# Patient Record
Sex: MALE | Race: WHITE | NOT HISPANIC OR LATINO | URBAN - METROPOLITAN AREA
[De-identification: names, ages, dates, MRNs, and addresses within clinical notes are randomized per-mention and may not be internally consistent; named-entity substitution may affect disease eponyms.]

---

## 2017-05-23 ENCOUNTER — EMERGENCY (EMERGENCY)
Facility: HOSPITAL | Age: 83
LOS: 1 days | Discharge: HOME | End: 2017-05-23
Attending: INTERNAL MEDICINE | Admitting: INTERNAL MEDICINE
Payer: MEDICARE

## 2017-05-23 VITALS
RESPIRATION RATE: 18 BRPM | HEART RATE: 81 BPM | HEIGHT: 74 IN | OXYGEN SATURATION: 97 % | DIASTOLIC BLOOD PRESSURE: 70 MMHG | TEMPERATURE: 98 F | WEIGHT: 235.01 LBS | SYSTOLIC BLOOD PRESSURE: 159 MMHG

## 2017-05-23 VITALS
RESPIRATION RATE: 17 BRPM | TEMPERATURE: 99 F | OXYGEN SATURATION: 98 % | SYSTOLIC BLOOD PRESSURE: 149 MMHG | DIASTOLIC BLOOD PRESSURE: 83 MMHG | HEART RATE: 79 BPM

## 2017-05-23 DIAGNOSIS — D37.9 NEOPLASM OF UNCERTAIN BEHAVIOR OF DIGESTIVE ORGAN, UNSPECIFIED: Chronic | ICD-10-CM

## 2017-05-23 PROCEDURE — 99283 EMERGENCY DEPT VISIT LOW MDM: CPT

## 2017-05-23 PROCEDURE — 99282 EMERGENCY DEPT VISIT SF MDM: CPT

## 2017-05-23 RX ORDER — MORPHINE SULFATE 50 MG/1
4 CAPSULE, EXTENDED RELEASE ORAL ONCE
Qty: 0 | Refills: 0 | Status: DISCONTINUED | OUTPATIENT
Start: 2017-05-23 | End: 2017-05-23

## 2017-05-23 NOTE — ED PROVIDER NOTE - MEDICAL DECISION MAKING DETAILS
seems to have had reaction to chemical he used on feet last night and exacerbated today after using epsom salts...pain resolved in er without rx.

## 2017-05-23 NOTE — ED PROVIDER NOTE - PHYSICAL EXAMINATION
some edema of both LEs that he always experiences. capillary refill on right LE slightly slow. neither hot or cold. not erythematous. no rash. no reproducible pain on palpation some chronic edema of both LEs . capillary refill on right LE slightly slow. neither hot or cold. not erythematous. no rash. no reproducible pain on palpation.pulses slightly diminished bilat..

## 2017-05-23 NOTE — ED PROVIDER NOTE - DETAILS:
Lamin Santos MD - The scribe's documentation has been prepared under my direction and personally reviewed by me in its entirety. I confirm that the note above accurately reflects all work, treatment, procedures, and medical decision making performed by me.

## 2017-05-23 NOTE — ED PROVIDER NOTE - CARDIAC, MLM
Normal rate, regular rhythm.  Heart sounds S1, S2.  No murmurs, rubs or gallops. capillary refill on right lower extremity slightly slow Normal rate, regular rhythm.  Heart sounds S1, S2.  No murmurs, rubs or gallops. capillary refill on right lower extremity slightly slow. mild edema of both lower extremities that he always experiences

## 2017-05-23 NOTE — ED PROVIDER NOTE - OBJECTIVE STATEMENT
82 y/o M pt with history of HTN, RBBB, presents to the ED c/o pain in both lower extremities from the foot radiating to just above the ankle, beginning yesterday after using some chemical on his feet. Pt states the pain slightly improved, but then this morning, after again applying epsom salts to both feet had the same pain in that area. Pt recently went to Podiatrist where they had his great toes trimmed. No hx of neuropathy in feet. Denies numbness/tingling, fever. No further complaints at this time. 82 y/o M pt with history of HTN, RBBB, presents to the ED c/o pain in both lower extremities from the foot radiating to just above the ankle, beginning yesterday after using some chemical on his feet. Pt states the pain improved, but then this morning, after again applying epsom salts to both feet had the same pain in that area. Pt recently went to Podiatrist where they had his great toes trimmed. No hx of neuropathy in feet. Denies numbness/tingling, fever. No further complaints at this time.no fever

## 2017-05-23 NOTE — ED PROVIDER NOTE - NS ED MD SCRIBE ATTENDING SCRIBE SECTIONS
HISTORY OF PRESENT ILLNESS/PHYSICAL EXAM/PAST MEDICAL/SURGICAL/SOCIAL HISTORY/DISPOSITION/VITAL SIGNS( Pullset)/REVIEW OF SYSTEMS

## 2017-05-23 NOTE — ED PROVIDER NOTE - SKIN, MLM
Skin normal color for race, dry and intact. No evidence of rash. neither hot or cold. not erythematous.

## 2017-05-23 NOTE — ED PROVIDER NOTE - MUSCULOSKELETAL, MLM
Spine appears normal, range of motion is not limited, no muscle or joint tenderness. no reproducible pain on palpation

## 2017-05-23 NOTE — ED ADULT NURSE NOTE - OBJECTIVE STATEMENT
Pt reported came in for burning pain over his bilateral ankles and feet area since yesterday after he applied chemical cream to the affected area. No rash no erythema noted but slight swelling of the bilateral ankles and feet area noted. Pt reported that swelling is not new. Pt is comfortable looking no distress noted.

## 2017-05-26 ENCOUNTER — TRANSCRIPTION ENCOUNTER (OUTPATIENT)
Age: 83
End: 2017-05-26

## 2017-06-10 ENCOUNTER — TRANSCRIPTION ENCOUNTER (OUTPATIENT)
Age: 83
End: 2017-06-10

## 2017-07-21 ENCOUNTER — EMERGENCY (EMERGENCY)
Facility: HOSPITAL | Age: 83
LOS: 0 days | Discharge: HOME | End: 2017-07-21
Attending: EMERGENCY MEDICINE | Admitting: EMERGENCY MEDICINE
Payer: MEDICARE

## 2017-07-21 VITALS
DIASTOLIC BLOOD PRESSURE: 110 MMHG | HEART RATE: 86 BPM | TEMPERATURE: 98 F | OXYGEN SATURATION: 98 % | SYSTOLIC BLOOD PRESSURE: 159 MMHG | RESPIRATION RATE: 18 BRPM

## 2017-07-21 VITALS
RESPIRATION RATE: 18 BRPM | TEMPERATURE: 98 F | HEART RATE: 87 BPM | HEIGHT: 74 IN | DIASTOLIC BLOOD PRESSURE: 93 MMHG | SYSTOLIC BLOOD PRESSURE: 169 MMHG | WEIGHT: 235.01 LBS | OXYGEN SATURATION: 98 %

## 2017-07-21 DIAGNOSIS — J45.909 UNSPECIFIED ASTHMA, UNCOMPLICATED: ICD-10-CM

## 2017-07-21 DIAGNOSIS — D37.9 NEOPLASM OF UNCERTAIN BEHAVIOR OF DIGESTIVE ORGAN, UNSPECIFIED: Chronic | ICD-10-CM

## 2017-07-21 DIAGNOSIS — I10 ESSENTIAL (PRIMARY) HYPERTENSION: ICD-10-CM

## 2017-07-21 DIAGNOSIS — Z85.048 PERSONAL HISTORY OF OTHER MALIGNANT NEOPLASM OF RECTUM, RECTOSIGMOID JUNCTION, AND ANUS: ICD-10-CM

## 2017-07-21 DIAGNOSIS — I45.10 UNSPECIFIED RIGHT BUNDLE-BRANCH BLOCK: ICD-10-CM

## 2017-07-21 DIAGNOSIS — N39.0 URINARY TRACT INFECTION, SITE NOT SPECIFIED: ICD-10-CM

## 2017-07-21 DIAGNOSIS — Z46.6 ENCOUNTER FOR FITTING AND ADJUSTMENT OF URINARY DEVICE: ICD-10-CM

## 2017-07-21 DIAGNOSIS — R33.9 RETENTION OF URINE, UNSPECIFIED: ICD-10-CM

## 2017-07-21 LAB
ANION GAP SERPL CALC-SCNC: 4 MMOL/L — LOW (ref 5–17)
APPEARANCE UR: CLEAR — SIGNIFICANT CHANGE UP
BACTERIA # UR AUTO: (no result)
BASOPHILS # BLD AUTO: 0.1 K/UL — SIGNIFICANT CHANGE UP (ref 0–0.2)
BASOPHILS NFR BLD AUTO: 1 % — SIGNIFICANT CHANGE UP (ref 0–2)
BILIRUB UR-MCNC: NEGATIVE — SIGNIFICANT CHANGE UP
BUN SERPL-MCNC: 22 MG/DL — SIGNIFICANT CHANGE UP (ref 7–23)
CALCIUM SERPL-MCNC: 9.8 MG/DL — SIGNIFICANT CHANGE UP (ref 8.5–10.1)
CHLORIDE SERPL-SCNC: 102 MMOL/L — SIGNIFICANT CHANGE UP (ref 96–108)
CO2 SERPL-SCNC: 30 MMOL/L — SIGNIFICANT CHANGE UP (ref 22–31)
COLOR SPEC: YELLOW — SIGNIFICANT CHANGE UP
COMMENT - URINE: SIGNIFICANT CHANGE UP
CREAT SERPL-MCNC: 1.08 MG/DL — SIGNIFICANT CHANGE UP (ref 0.5–1.3)
DIFF PNL FLD: (no result)
EOSINOPHIL # BLD AUTO: 0.2 K/UL — SIGNIFICANT CHANGE UP (ref 0–0.5)
EOSINOPHIL NFR BLD AUTO: 2.4 % — SIGNIFICANT CHANGE UP (ref 0–6)
EPI CELLS # UR: SIGNIFICANT CHANGE UP
GLUCOSE SERPL-MCNC: 173 MG/DL — HIGH (ref 70–99)
GLUCOSE UR QL: NEGATIVE MG/DL — SIGNIFICANT CHANGE UP
HCT VFR BLD CALC: 42.6 % — SIGNIFICANT CHANGE UP (ref 39–50)
HGB BLD-MCNC: 14.3 G/DL — SIGNIFICANT CHANGE UP (ref 13–17)
KETONES UR-MCNC: NEGATIVE — SIGNIFICANT CHANGE UP
LEUKOCYTE ESTERASE UR-ACNC: (no result)
LYMPHOCYTES # BLD AUTO: 2 K/UL — SIGNIFICANT CHANGE UP (ref 1–3.3)
LYMPHOCYTES # BLD AUTO: 28.3 % — SIGNIFICANT CHANGE UP (ref 13–44)
MCHC RBC-ENTMCNC: 29.1 PG — SIGNIFICANT CHANGE UP (ref 27–34)
MCHC RBC-ENTMCNC: 33.5 GM/DL — SIGNIFICANT CHANGE UP (ref 32–36)
MCV RBC AUTO: 86.8 FL — SIGNIFICANT CHANGE UP (ref 80–100)
MONOCYTES # BLD AUTO: 0.6 K/UL — SIGNIFICANT CHANGE UP (ref 0–0.9)
MONOCYTES NFR BLD AUTO: 8.5 % — SIGNIFICANT CHANGE UP (ref 2–14)
NEUTROPHILS # BLD AUTO: 4.1 K/UL — SIGNIFICANT CHANGE UP (ref 1.8–7.4)
NEUTROPHILS NFR BLD AUTO: 59.7 % — SIGNIFICANT CHANGE UP (ref 43–77)
NITRITE UR-MCNC: NEGATIVE — SIGNIFICANT CHANGE UP
PH UR: 6 — SIGNIFICANT CHANGE UP (ref 5–8)
PLATELET # BLD AUTO: 134 K/UL — LOW (ref 150–400)
POTASSIUM SERPL-MCNC: 3.9 MMOL/L — SIGNIFICANT CHANGE UP (ref 3.5–5.3)
POTASSIUM SERPL-SCNC: 3.9 MMOL/L — SIGNIFICANT CHANGE UP (ref 3.5–5.3)
PROT UR-MCNC: 30 MG/DL
RBC # BLD: 4.91 M/UL — SIGNIFICANT CHANGE UP (ref 4.2–5.8)
RBC # FLD: 12.5 % — SIGNIFICANT CHANGE UP (ref 10.3–14.5)
RBC CASTS # UR COMP ASSIST: >50 /HPF (ref 0–4)
SODIUM SERPL-SCNC: 136 MMOL/L — SIGNIFICANT CHANGE UP (ref 135–145)
SP GR SPEC: 1.01 — SIGNIFICANT CHANGE UP (ref 1.01–1.02)
UROBILINOGEN FLD QL: NEGATIVE MG/DL — SIGNIFICANT CHANGE UP
WBC # BLD: 6.9 K/UL — SIGNIFICANT CHANGE UP (ref 3.8–10.5)
WBC # FLD AUTO: 6.9 K/UL — SIGNIFICANT CHANGE UP (ref 3.8–10.5)
WBC UR QL: (no result)

## 2017-07-21 PROCEDURE — 93010 ELECTROCARDIOGRAM REPORT: CPT

## 2017-07-21 PROCEDURE — 99284 EMERGENCY DEPT VISIT MOD MDM: CPT

## 2017-07-21 RX ORDER — LIDOCAINE 4 G/100G
3 CREAM TOPICAL
Qty: 1 | Refills: 0
Start: 2017-07-21 | End: 2017-07-24

## 2017-07-21 RX ORDER — CIPROFLOXACIN LACTATE 400MG/40ML
500 VIAL (ML) INTRAVENOUS ONCE
Qty: 0 | Refills: 0 | Status: COMPLETED | OUTPATIENT
Start: 2017-07-21 | End: 2017-07-21

## 2017-07-21 RX ORDER — MOXIFLOXACIN HYDROCHLORIDE TABLETS, 400 MG 400 MG/1
1 TABLET, FILM COATED ORAL
Qty: 8 | Refills: 0
Start: 2017-07-21 | End: 2017-07-25

## 2017-07-21 RX ADMIN — Medication 500 MILLIGRAM(S): at 15:50

## 2017-07-21 NOTE — ED ADULT NURSE REASSESSMENT NOTE - COMFORT CARE
side rails up/repositioned/patient is comfortable in bed with family, at bedside. hourly rounding completed

## 2017-07-21 NOTE — ED PROVIDER NOTE - PROGRESS NOTE DETAILS
none Boom Mccain, on behalf of Attending. EDRN placed robertson cath with 600cc urine outpt. Patient feels uncomfortable with catheter in place and wishes to have it removed. Have discussed plan to keep cath in place till Monday then f/u with urology on Wednesday and patient states he will discuss with family to keep cath in place or not. Boom Mccain, on behalf of Attending. Per Son Patient insists on removal of catheter and [place] had prior catheterizations without pain. Currently offered pain meds and lidocaine jelly to the area for relief and keeping of catheter in place. pt with uti secondary to instrumentation last week pt requested cipro as bactrim that he took after procedure did not help him, explained risk of tendinitis and pt understands

## 2017-07-21 NOTE — ED ADULT TRIAGE NOTE - CHIEF COMPLAINT QUOTE
c/o bladder pain, was recently told with urinary retention, recently had urodynamic testing resulting in problems with nerve endings, may need a catheter as per pateint, denies hematuria, small urine output.

## 2017-07-21 NOTE — ED PROVIDER NOTE - OBJECTIVE STATEMENT
84 y/o male with urinary retention with associated Lower abd pain within the past few hours. Patient states he had urodynamic testing and urology outpt follow up scheduled in the next 5 days. C/o abd pain, distention and discomfort. In ED for evaluation of pain and decreased urine outpt. Denies fever, chills, leg pain, any other complaints.

## 2017-08-20 ENCOUNTER — TRANSCRIPTION ENCOUNTER (OUTPATIENT)
Age: 83
End: 2017-08-20

## 2019-05-28 ENCOUNTER — EMERGENCY (EMERGENCY)
Facility: HOSPITAL | Age: 85
LOS: 0 days | Discharge: LEFT BEFORE DISCHARGE | End: 2019-05-28
Attending: EMERGENCY MEDICINE | Admitting: EMERGENCY MEDICINE
Payer: MEDICARE

## 2019-05-28 VITALS
SYSTOLIC BLOOD PRESSURE: 169 MMHG | TEMPERATURE: 98 F | OXYGEN SATURATION: 97 % | HEART RATE: 70 BPM | DIASTOLIC BLOOD PRESSURE: 97 MMHG | RESPIRATION RATE: 18 BRPM

## 2019-05-28 VITALS
DIASTOLIC BLOOD PRESSURE: 62 MMHG | TEMPERATURE: 98 F | RESPIRATION RATE: 18 BRPM | HEART RATE: 76 BPM | SYSTOLIC BLOOD PRESSURE: 165 MMHG | OXYGEN SATURATION: 95 %

## 2019-05-28 DIAGNOSIS — Z79.84 LONG TERM (CURRENT) USE OF ORAL HYPOGLYCEMIC DRUGS: ICD-10-CM

## 2019-05-28 DIAGNOSIS — J45.909 UNSPECIFIED ASTHMA, UNCOMPLICATED: ICD-10-CM

## 2019-05-28 DIAGNOSIS — Z85.048 PERSONAL HISTORY OF OTHER MALIGNANT NEOPLASM OF RECTUM, RECTOSIGMOID JUNCTION, AND ANUS: ICD-10-CM

## 2019-05-28 DIAGNOSIS — I10 ESSENTIAL (PRIMARY) HYPERTENSION: ICD-10-CM

## 2019-05-28 DIAGNOSIS — R47.81 SLURRED SPEECH: ICD-10-CM

## 2019-05-28 DIAGNOSIS — G45.9 TRANSIENT CEREBRAL ISCHEMIC ATTACK, UNSPECIFIED: ICD-10-CM

## 2019-05-28 DIAGNOSIS — D37.9 NEOPLASM OF UNCERTAIN BEHAVIOR OF DIGESTIVE ORGAN, UNSPECIFIED: Chronic | ICD-10-CM

## 2019-05-28 LAB
ALBUMIN SERPL ELPH-MCNC: 3.1 G/DL — LOW (ref 3.3–5)
ALP SERPL-CCNC: 75 U/L — SIGNIFICANT CHANGE UP (ref 40–120)
ALT FLD-CCNC: 19 U/L — SIGNIFICANT CHANGE UP (ref 12–78)
ANION GAP SERPL CALC-SCNC: 7 MMOL/L — SIGNIFICANT CHANGE UP (ref 5–17)
APPEARANCE UR: CLEAR — SIGNIFICANT CHANGE UP
APTT BLD: 32.4 SEC — SIGNIFICANT CHANGE UP (ref 27.5–36.3)
AST SERPL-CCNC: 14 U/L — LOW (ref 15–37)
BACTERIA # UR AUTO: ABNORMAL
BASOPHILS # BLD AUTO: 0.04 K/UL — SIGNIFICANT CHANGE UP (ref 0–0.2)
BASOPHILS NFR BLD AUTO: 0.6 % — SIGNIFICANT CHANGE UP (ref 0–2)
BILIRUB SERPL-MCNC: 0.3 MG/DL — SIGNIFICANT CHANGE UP (ref 0.2–1.2)
BILIRUB UR-MCNC: NEGATIVE — SIGNIFICANT CHANGE UP
BUN SERPL-MCNC: 18 MG/DL — SIGNIFICANT CHANGE UP (ref 7–23)
CALCIUM SERPL-MCNC: 9.3 MG/DL — SIGNIFICANT CHANGE UP (ref 8.5–10.1)
CHLORIDE SERPL-SCNC: 100 MMOL/L — SIGNIFICANT CHANGE UP (ref 96–108)
CO2 SERPL-SCNC: 31 MMOL/L — SIGNIFICANT CHANGE UP (ref 22–31)
COLOR SPEC: YELLOW — SIGNIFICANT CHANGE UP
COMMENT - URINE: SIGNIFICANT CHANGE UP
CREAT SERPL-MCNC: 1.25 MG/DL — SIGNIFICANT CHANGE UP (ref 0.5–1.3)
DIFF PNL FLD: ABNORMAL
EOSINOPHIL # BLD AUTO: 0.17 K/UL — SIGNIFICANT CHANGE UP (ref 0–0.5)
EOSINOPHIL NFR BLD AUTO: 2.4 % — SIGNIFICANT CHANGE UP (ref 0–6)
GLUCOSE SERPL-MCNC: 154 MG/DL — HIGH (ref 70–99)
GLUCOSE UR QL: NEGATIVE MG/DL — SIGNIFICANT CHANGE UP
HCT VFR BLD CALC: 44.3 % — SIGNIFICANT CHANGE UP (ref 39–50)
HGB BLD-MCNC: 14.6 G/DL — SIGNIFICANT CHANGE UP (ref 13–17)
IMM GRANULOCYTES NFR BLD AUTO: 0.4 % — SIGNIFICANT CHANGE UP (ref 0–1.5)
INR BLD: 1.2 RATIO — HIGH (ref 0.88–1.16)
KETONES UR-MCNC: NEGATIVE — SIGNIFICANT CHANGE UP
LEUKOCYTE ESTERASE UR-ACNC: ABNORMAL
LYMPHOCYTES # BLD AUTO: 1.92 K/UL — SIGNIFICANT CHANGE UP (ref 1–3.3)
LYMPHOCYTES # BLD AUTO: 27.4 % — SIGNIFICANT CHANGE UP (ref 13–44)
MCHC RBC-ENTMCNC: 28.6 PG — SIGNIFICANT CHANGE UP (ref 27–34)
MCHC RBC-ENTMCNC: 33 GM/DL — SIGNIFICANT CHANGE UP (ref 32–36)
MCV RBC AUTO: 86.7 FL — SIGNIFICANT CHANGE UP (ref 80–100)
MONOCYTES # BLD AUTO: 0.59 K/UL — SIGNIFICANT CHANGE UP (ref 0–0.9)
MONOCYTES NFR BLD AUTO: 8.4 % — SIGNIFICANT CHANGE UP (ref 2–14)
NEUTROPHILS # BLD AUTO: 4.26 K/UL — SIGNIFICANT CHANGE UP (ref 1.8–7.4)
NEUTROPHILS NFR BLD AUTO: 60.8 % — SIGNIFICANT CHANGE UP (ref 43–77)
NITRITE UR-MCNC: NEGATIVE — SIGNIFICANT CHANGE UP
PH UR: 6 — SIGNIFICANT CHANGE UP (ref 5–8)
PLATELET # BLD AUTO: 144 K/UL — LOW (ref 150–400)
POTASSIUM SERPL-MCNC: 3.6 MMOL/L — SIGNIFICANT CHANGE UP (ref 3.5–5.3)
POTASSIUM SERPL-SCNC: 3.6 MMOL/L — SIGNIFICANT CHANGE UP (ref 3.5–5.3)
PROT SERPL-MCNC: 6.9 GM/DL — SIGNIFICANT CHANGE UP (ref 6–8.3)
PROT UR-MCNC: 30 MG/DL
PROTHROM AB SERPL-ACNC: 13.4 SEC — HIGH (ref 10–12.9)
RBC # BLD: 5.11 M/UL — SIGNIFICANT CHANGE UP (ref 4.2–5.8)
RBC # FLD: 13.3 % — SIGNIFICANT CHANGE UP (ref 10.3–14.5)
RBC CASTS # UR COMP ASSIST: ABNORMAL /HPF (ref 0–4)
SODIUM SERPL-SCNC: 138 MMOL/L — SIGNIFICANT CHANGE UP (ref 135–145)
SP GR SPEC: 1.01 — SIGNIFICANT CHANGE UP (ref 1.01–1.02)
TROPONIN I SERPL-MCNC: <0.015 NG/ML — SIGNIFICANT CHANGE UP (ref 0.01–0.04)
UROBILINOGEN FLD QL: NEGATIVE MG/DL — SIGNIFICANT CHANGE UP
WBC # BLD: 7.01 K/UL — SIGNIFICANT CHANGE UP (ref 3.8–10.5)
WBC # FLD AUTO: 7.01 K/UL — SIGNIFICANT CHANGE UP (ref 3.8–10.5)
WBC UR QL: SIGNIFICANT CHANGE UP

## 2019-05-28 PROCEDURE — 99285 EMERGENCY DEPT VISIT HI MDM: CPT | Mod: 25

## 2019-05-28 PROCEDURE — 70450 CT HEAD/BRAIN W/O DYE: CPT | Mod: 26

## 2019-05-28 PROCEDURE — 93010 ELECTROCARDIOGRAM REPORT: CPT

## 2019-05-28 NOTE — ED ADULT TRIAGE NOTE - CHIEF COMPLAINT QUOTE
pt presents to ED due to strokelike sxs x 6 weeks with right leg weakness and dragging slurred speech aphagia MD Baron to eval in [place] no code stroke at this time

## 2019-05-28 NOTE — ED PROVIDER NOTE - OBJECTIVE STATEMENT
86 y/o male HTN, HLD, self-cath x3 times per day presents to the ED with daughter for increasing TIA symptoms. At 10:00 this morning pt with episode of slurred speech and right leg weakness. Pt's family reports these sx's initially started about 6 weeks ago but are noticing these episodes increasing in frequency. Slurred speech and weakness this past Friday, Saturday, Monday, and today. Pt reports some lightheadedness. No h/o TIA. Pt's family reports they expressed concern to pt's PMD who told them to give pt additional ASA. Pt does not have a neurologist at this time.

## 2019-05-28 NOTE — ED ADULT NURSE NOTE - NSIMPLEMENTINTERV_GEN_ALL_ED
Implemented All Fall with Harm Risk Interventions:  [place] to call system. Call bell, personal items and telephone within reach. Instruct patient to call for assistance. Room bathroom lighting operational. Non-slip footwear when patient is off stretcher. Physically safe environment: no spills, clutter or unnecessary equipment. Stretcher in lowest position, wheels locked, appropriate side rails in place. Provide visual cue, wrist band, yellow gown, etc. Monitor gait and stability. Monitor for mental status changes and reorient to person, place, and time. Review medications for side effects contributing to fall risk. Reinforce activity limits and safety measures with patient and family. Provide visual clues: red socks.

## 2019-05-28 NOTE — ED STATDOCS - PROGRESS NOTE DETAILS
Adán MENDIOLA for ED attending Dr. Baron: 84 y/o male presents with daughter for TIA symptoms. At 10:00 this morning pt with episode of slurred speech and right leg weakness. Symptoms currently resolved. NIH currently 0. Given rapid improvement of symptoms pt is not a tPA candidate. Pt will be sent to main for full work-up.

## 2019-05-28 NOTE — ED PROVIDER NOTE - NEUROLOGICAL, MLM
Alert and oriented, no focal deficits, no motor or sensory deficits. Alert and oriented, no focal deficits, no motor or sensory deficits. 5/5 strength in all 4 extremities. Cranial nerves 2-12 intact. Sensation intact. No dysmetria.

## 2019-05-28 NOTE — ED STATDOCS - NS_ ATTENDINGSCRIBEDETAILS _ED_A_ED_FT
I, Edgar Baron MD, performed the initial face to face bedside interview with this patient regarding history of present illness and determined that the patient should be seen in the main ED.  The history, was documented by the scribe in my presence and I attest to the accuracy of the documentation.

## 2019-05-28 NOTE — ED PROVIDER NOTE - CARE PROVIDER_API CALL
Gee Levy)  Internal Medicine; Neurology  [address], [address]  [place] [address]  Phone: [phone]  Fax: [phone]  Follow Up Time: 1-3 Days

## 2019-05-28 NOTE — ED PROVIDER NOTE - CLINICAL SUMMARY MEDICAL DECISION MAKING FREE TEXT BOX
Pt with multiple TIA's. Plan labs, CT head, EKG, and reassess. Pt signing out AMA to go to [facility] to see doctor there.

## 2019-05-28 NOTE — ED ADULT NURSE NOTE - OBJECTIVE STATEMENT
Patient comes to ED for slurred speech and right sided leg weakness. pt has been having symptoms for 6 weeks/ pt has had repeat episodes on friday sunday, monday and today. slurred speech is resolved, right sided leg weakness. Pt is AxOx4, no complaints any pain or discomfort. Pt was told by MD outpatient to increase asa and if symptoms worsen to come to ED. pt has all doctors at [place]

## 2019-05-28 NOTE — ED PROVIDER NOTE - PROGRESS NOTE DETAILS
Adán NUGENT for attending Dr. Guadalupe: Pt having multiple TIA's. Family refusing admission here. Want to go to doctor at [facility]. States internist will not accept them there so were advised to sign out AMA and take private ambulette there. Pt and family prefer to be at [facility]. 5/5 strength in all 4 extremities. Cranial nerves 2-12 intact. Sensation intact. No dysmetria. Pt will sign out AMA at pt's request. The pt is clinically sober, A&Ox3, free from distracting injury.  Throughout our interactions in the ED today, the pt has demonstrated concrete thinking/reasoning, has maintained an orderly/reasonable conversation, appears to have intact insight/judgment/reason, a sound mind and therefore in our opinion has capacity now to make decisions.  Given the pt’s presentation, we communicated (in laymen's terms) our concerns.  The pt verbalized an understanding of our worries.  We’ve communicated to the patient that the ED evaluation is incomplete & many troublesome conditions haven’t been r/o.  We have discussed the need for further ED w/u so we can get more information about the pt’s condition.  We have discussed the range of possible dx, potential testing & tx options.  Our discussions included the potential outcomes of leaving AMA, including worsening of their condition, becoming permanently disabled/in pain/critically ill, or death.  Despite these efforts, we were unable to convince the pt to stay.  The pt is refusing any further care and is leaving against medical advice. We have attempted to offer tx/rx/guidance for any dangerous conditions which are most likely and/or dangerous.  We have answered all questions and have implored the pt to return ASAP to complete the w/u.

## 2019-06-01 ENCOUNTER — INPATIENT (INPATIENT)
Facility: HOSPITAL | Age: 85
LOS: 1 days | Discharge: HOME | DRG: 312 | End: 2019-06-03
Attending: INTERNAL MEDICINE | Admitting: FAMILY MEDICINE
Payer: MEDICARE

## 2019-06-01 VITALS
OXYGEN SATURATION: 96 % | TEMPERATURE: 98 F | WEIGHT: 291.89 LBS | DIASTOLIC BLOOD PRESSURE: 77 MMHG | HEIGHT: 71 IN | RESPIRATION RATE: 15 BRPM | SYSTOLIC BLOOD PRESSURE: 145 MMHG | HEART RATE: 80 BPM

## 2019-06-01 DIAGNOSIS — Z90.79 ACQUIRED ABSENCE OF OTHER GENITAL ORGAN(S): Chronic | ICD-10-CM

## 2019-06-01 DIAGNOSIS — J45.909 UNSPECIFIED ASTHMA, UNCOMPLICATED: ICD-10-CM

## 2019-06-01 DIAGNOSIS — R94.31 ABNORMAL ELECTROCARDIOGRAM [ECG] [EKG]: ICD-10-CM

## 2019-06-01 DIAGNOSIS — G45.9 TRANSIENT CEREBRAL ISCHEMIC ATTACK, UNSPECIFIED: ICD-10-CM

## 2019-06-01 DIAGNOSIS — N17.9 ACUTE KIDNEY FAILURE, UNSPECIFIED: ICD-10-CM

## 2019-06-01 DIAGNOSIS — Z29.9 ENCOUNTER FOR PROPHYLACTIC MEASURES, UNSPECIFIED: ICD-10-CM

## 2019-06-01 DIAGNOSIS — E11.9 TYPE 2 DIABETES MELLITUS WITHOUT COMPLICATIONS: ICD-10-CM

## 2019-06-01 DIAGNOSIS — R33.9 RETENTION OF URINE, UNSPECIFIED: ICD-10-CM

## 2019-06-01 DIAGNOSIS — C20 MALIGNANT NEOPLASM OF RECTUM: ICD-10-CM

## 2019-06-01 DIAGNOSIS — D37.9 NEOPLASM OF UNCERTAIN BEHAVIOR OF DIGESTIVE ORGAN, UNSPECIFIED: Chronic | ICD-10-CM

## 2019-06-01 DIAGNOSIS — I45.10 UNSPECIFIED RIGHT BUNDLE-BRANCH BLOCK: ICD-10-CM

## 2019-06-01 DIAGNOSIS — I10 ESSENTIAL (PRIMARY) HYPERTENSION: ICD-10-CM

## 2019-06-01 LAB
ALBUMIN SERPL ELPH-MCNC: 3.5 G/DL — SIGNIFICANT CHANGE UP (ref 3.3–5)
ALP SERPL-CCNC: 68 U/L — SIGNIFICANT CHANGE UP (ref 40–120)
ALT FLD-CCNC: 24 U/L — SIGNIFICANT CHANGE UP (ref 12–78)
ANION GAP SERPL CALC-SCNC: 7 MMOL/L — SIGNIFICANT CHANGE UP (ref 5–17)
APPEARANCE UR: CLEAR — SIGNIFICANT CHANGE UP
APTT BLD: 28.3 SEC — SIGNIFICANT CHANGE UP (ref 27.5–36.3)
AST SERPL-CCNC: 25 U/L — SIGNIFICANT CHANGE UP (ref 15–37)
BASOPHILS # BLD AUTO: 0.03 K/UL — SIGNIFICANT CHANGE UP (ref 0–0.2)
BASOPHILS NFR BLD AUTO: 0.4 % — SIGNIFICANT CHANGE UP (ref 0–2)
BILIRUB SERPL-MCNC: 0.6 MG/DL — SIGNIFICANT CHANGE UP (ref 0.2–1.2)
BILIRUB UR-MCNC: NEGATIVE — SIGNIFICANT CHANGE UP
BUN SERPL-MCNC: 22 MG/DL — SIGNIFICANT CHANGE UP (ref 7–23)
CALCIUM SERPL-MCNC: 9.7 MG/DL — SIGNIFICANT CHANGE UP (ref 8.5–10.1)
CHLORIDE SERPL-SCNC: 100 MMOL/L — SIGNIFICANT CHANGE UP (ref 96–108)
CK MB BLD-MCNC: 1.4 % — SIGNIFICANT CHANGE UP (ref 0–3.5)
CK MB CFR SERPL CALC: 2.9 NG/ML — SIGNIFICANT CHANGE UP (ref 0–3.6)
CK SERPL-CCNC: 202 U/L — SIGNIFICANT CHANGE UP (ref 26–308)
CO2 SERPL-SCNC: 32 MMOL/L — HIGH (ref 22–31)
COLOR SPEC: YELLOW — SIGNIFICANT CHANGE UP
CREAT SERPL-MCNC: 1.6 MG/DL — HIGH (ref 0.5–1.3)
DIFF PNL FLD: ABNORMAL
EOSINOPHIL # BLD AUTO: 0.09 K/UL — SIGNIFICANT CHANGE UP (ref 0–0.5)
EOSINOPHIL NFR BLD AUTO: 1.1 % — SIGNIFICANT CHANGE UP (ref 0–6)
GLUCOSE SERPL-MCNC: 145 MG/DL — HIGH (ref 70–99)
GLUCOSE UR QL: NEGATIVE — SIGNIFICANT CHANGE UP
HCT VFR BLD CALC: 46.7 % — SIGNIFICANT CHANGE UP (ref 39–50)
HGB BLD-MCNC: 15.3 G/DL — SIGNIFICANT CHANGE UP (ref 13–17)
IMM GRANULOCYTES NFR BLD AUTO: 0.2 % — SIGNIFICANT CHANGE UP (ref 0–1.5)
INR BLD: 1.24 RATIO — HIGH (ref 0.88–1.16)
INR BLD: 1.26 RATIO — HIGH (ref 0.88–1.16)
KETONES UR-MCNC: ABNORMAL
LEUKOCYTE ESTERASE UR-ACNC: ABNORMAL
LYMPHOCYTES # BLD AUTO: 1.86 K/UL — SIGNIFICANT CHANGE UP (ref 1–3.3)
LYMPHOCYTES # BLD AUTO: 23.1 % — SIGNIFICANT CHANGE UP (ref 13–44)
MCHC RBC-ENTMCNC: 28 PG — SIGNIFICANT CHANGE UP (ref 27–34)
MCHC RBC-ENTMCNC: 32.8 GM/DL — SIGNIFICANT CHANGE UP (ref 32–36)
MCV RBC AUTO: 85.4 FL — SIGNIFICANT CHANGE UP (ref 80–100)
MONOCYTES # BLD AUTO: 0.73 K/UL — SIGNIFICANT CHANGE UP (ref 0–0.9)
MONOCYTES NFR BLD AUTO: 9.1 % — SIGNIFICANT CHANGE UP (ref 2–14)
NEUTROPHILS # BLD AUTO: 5.33 K/UL — SIGNIFICANT CHANGE UP (ref 1.8–7.4)
NEUTROPHILS NFR BLD AUTO: 66.1 % — SIGNIFICANT CHANGE UP (ref 43–77)
NITRITE UR-MCNC: NEGATIVE — SIGNIFICANT CHANGE UP
NRBC # BLD: 0 /100 WBCS — SIGNIFICANT CHANGE UP (ref 0–0)
PH UR: 5 — SIGNIFICANT CHANGE UP (ref 5–8)
PLATELET # BLD AUTO: 164 K/UL — SIGNIFICANT CHANGE UP (ref 150–400)
POTASSIUM SERPL-MCNC: 3.5 MMOL/L — SIGNIFICANT CHANGE UP (ref 3.5–5.3)
POTASSIUM SERPL-SCNC: 3.5 MMOL/L — SIGNIFICANT CHANGE UP (ref 3.5–5.3)
PROT SERPL-MCNC: 7 G/DL — SIGNIFICANT CHANGE UP (ref 6–8.3)
PROT UR-MCNC: 150 MG/DL
PROTHROM AB SERPL-ACNC: 14.1 SEC — HIGH (ref 10–12.9)
PROTHROM AB SERPL-ACNC: 14.3 SEC — HIGH (ref 10–12.9)
RBC # BLD: 5.47 M/UL — SIGNIFICANT CHANGE UP (ref 4.2–5.8)
RBC # FLD: 13.8 % — SIGNIFICANT CHANGE UP (ref 10.3–14.5)
SODIUM SERPL-SCNC: 139 MMOL/L — SIGNIFICANT CHANGE UP (ref 135–145)
SP GR SPEC: 1.01 — SIGNIFICANT CHANGE UP (ref 1.01–1.02)
TROPONIN I SERPL-MCNC: <.015 NG/ML — SIGNIFICANT CHANGE UP (ref 0.01–0.04)
UROBILINOGEN FLD QL: NEGATIVE — SIGNIFICANT CHANGE UP
WBC # BLD: 8.06 K/UL — SIGNIFICANT CHANGE UP (ref 3.8–10.5)
WBC # FLD AUTO: 8.06 K/UL — SIGNIFICANT CHANGE UP (ref 3.8–10.5)

## 2019-06-01 PROCEDURE — 93010 ELECTROCARDIOGRAM REPORT: CPT

## 2019-06-01 PROCEDURE — 99223 1ST HOSP IP/OBS HIGH 75: CPT | Mod: GC,AI

## 2019-06-01 PROCEDURE — 99285 EMERGENCY DEPT VISIT HI MDM: CPT

## 2019-06-01 PROCEDURE — 70450 CT HEAD/BRAIN W/O DYE: CPT | Mod: 26

## 2019-06-01 RX ORDER — GLUCAGON INJECTION, SOLUTION 0.5 MG/.1ML
1 INJECTION, SOLUTION SUBCUTANEOUS ONCE
Refills: 0 | Status: DISCONTINUED | OUTPATIENT
Start: 2019-06-01 | End: 2019-06-03

## 2019-06-01 RX ORDER — SULFASALAZINE 500 MG
500 TABLET ORAL DAILY
Refills: 0 | Status: DISCONTINUED | OUTPATIENT
Start: 2019-06-01 | End: 2019-06-03

## 2019-06-01 RX ORDER — CLOPIDOGREL BISULFATE 75 MG/1
75 TABLET, FILM COATED ORAL ONCE
Refills: 0 | Status: COMPLETED | OUTPATIENT
Start: 2019-06-01 | End: 2019-06-01

## 2019-06-01 RX ORDER — CLOPIDOGREL BISULFATE 75 MG/1
75 TABLET, FILM COATED ORAL DAILY
Refills: 0 | Status: DISCONTINUED | OUTPATIENT
Start: 2019-06-01 | End: 2019-06-03

## 2019-06-01 RX ORDER — SODIUM CHLORIDE 9 MG/ML
1000 INJECTION, SOLUTION INTRAVENOUS
Refills: 0 | Status: DISCONTINUED | OUTPATIENT
Start: 2019-06-01 | End: 2019-06-03

## 2019-06-01 RX ORDER — DOXAZOSIN MESYLATE 4 MG
4 TABLET ORAL AT BEDTIME
Refills: 0 | Status: DISCONTINUED | OUTPATIENT
Start: 2019-06-01 | End: 2019-06-03

## 2019-06-01 RX ORDER — CEFPODOXIME PROXETIL 100 MG
200 TABLET ORAL EVERY 12 HOURS
Refills: 0 | Status: DISCONTINUED | OUTPATIENT
Start: 2019-06-01 | End: 2019-06-03

## 2019-06-01 RX ORDER — BUDESONIDE AND FORMOTEROL FUMARATE DIHYDRATE 160; 4.5 UG/1; UG/1
2 AEROSOL RESPIRATORY (INHALATION)
Refills: 0 | Status: DISCONTINUED | OUTPATIENT
Start: 2019-06-01 | End: 2019-06-01

## 2019-06-01 RX ORDER — DEXTROSE 50 % IN WATER 50 %
25 SYRINGE (ML) INTRAVENOUS ONCE
Refills: 0 | Status: DISCONTINUED | OUTPATIENT
Start: 2019-06-01 | End: 2019-06-03

## 2019-06-01 RX ORDER — HEPARIN SODIUM 5000 [USP'U]/ML
5000 INJECTION INTRAVENOUS; SUBCUTANEOUS EVERY 12 HOURS
Refills: 0 | Status: DISCONTINUED | OUTPATIENT
Start: 2019-06-01 | End: 2019-06-03

## 2019-06-01 RX ORDER — ENOXAPARIN SODIUM 100 MG/ML
0 INJECTION SUBCUTANEOUS
Qty: 0 | Refills: 0 | DISCHARGE

## 2019-06-01 RX ORDER — ATORVASTATIN CALCIUM 80 MG/1
80 TABLET, FILM COATED ORAL AT BEDTIME
Refills: 0 | Status: DISCONTINUED | OUTPATIENT
Start: 2019-06-01 | End: 2019-06-03

## 2019-06-01 RX ORDER — SODIUM CHLORIDE 9 MG/ML
1000 INJECTION INTRAMUSCULAR; INTRAVENOUS; SUBCUTANEOUS
Refills: 0 | Status: DISCONTINUED | OUTPATIENT
Start: 2019-06-01 | End: 2019-06-03

## 2019-06-01 RX ORDER — INSULIN LISPRO 100/ML
VIAL (ML) SUBCUTANEOUS
Refills: 0 | Status: DISCONTINUED | OUTPATIENT
Start: 2019-06-01 | End: 2019-06-03

## 2019-06-01 RX ORDER — PANTOPRAZOLE SODIUM 20 MG/1
40 TABLET, DELAYED RELEASE ORAL
Refills: 0 | Status: DISCONTINUED | OUTPATIENT
Start: 2019-06-01 | End: 2019-06-03

## 2019-06-01 RX ORDER — ASPIRIN/CALCIUM CARB/MAGNESIUM 324 MG
81 TABLET ORAL DAILY
Refills: 0 | Status: DISCONTINUED | OUTPATIENT
Start: 2019-06-01 | End: 2019-06-03

## 2019-06-01 RX ORDER — INSULIN LISPRO 100/ML
VIAL (ML) SUBCUTANEOUS AT BEDTIME
Refills: 0 | Status: DISCONTINUED | OUTPATIENT
Start: 2019-06-01 | End: 2019-06-03

## 2019-06-01 RX ORDER — MOMETASONE FUROATE AND FORMOTEROL FUMARATE DIHYDRATE 200; 5 UG/1; UG/1
2 AEROSOL RESPIRATORY (INHALATION)
Refills: 0 | Status: DISCONTINUED | OUTPATIENT
Start: 2019-06-01 | End: 2019-06-03

## 2019-06-01 RX ORDER — DEXTROSE 50 % IN WATER 50 %
12.5 SYRINGE (ML) INTRAVENOUS ONCE
Refills: 0 | Status: DISCONTINUED | OUTPATIENT
Start: 2019-06-01 | End: 2019-06-03

## 2019-06-01 RX ORDER — SODIUM CHLORIDE 9 MG/ML
1000 INJECTION INTRAMUSCULAR; INTRAVENOUS; SUBCUTANEOUS ONCE
Refills: 0 | Status: COMPLETED | OUTPATIENT
Start: 2019-06-01 | End: 2019-06-01

## 2019-06-01 RX ORDER — NADOLOL 80 MG/1
1 TABLET ORAL
Qty: 0 | Refills: 0 | DISCHARGE

## 2019-06-01 RX ORDER — DEXTROSE 50 % IN WATER 50 %
15 SYRINGE (ML) INTRAVENOUS ONCE
Refills: 0 | Status: DISCONTINUED | OUTPATIENT
Start: 2019-06-01 | End: 2019-06-03

## 2019-06-01 RX ADMIN — SODIUM CHLORIDE 1000 MILLILITER(S): 9 INJECTION INTRAMUSCULAR; INTRAVENOUS; SUBCUTANEOUS at 13:15

## 2019-06-01 RX ADMIN — ATORVASTATIN CALCIUM 80 MILLIGRAM(S): 80 TABLET, FILM COATED ORAL at 21:29

## 2019-06-01 RX ADMIN — Medication 4 MILLIGRAM(S): at 21:30

## 2019-06-01 RX ADMIN — SODIUM CHLORIDE 75 MILLILITER(S): 9 INJECTION INTRAMUSCULAR; INTRAVENOUS; SUBCUTANEOUS at 18:45

## 2019-06-01 RX ADMIN — CLOPIDOGREL BISULFATE 75 MILLIGRAM(S): 75 TABLET, FILM COATED ORAL at 13:18

## 2019-06-01 RX ADMIN — Medication 200 MILLIGRAM(S): at 18:34

## 2019-06-01 RX ADMIN — HEPARIN SODIUM 5000 UNIT(S): 5000 INJECTION INTRAVENOUS; SUBCUTANEOUS at 21:30

## 2019-06-01 RX ADMIN — SODIUM CHLORIDE 2000 MILLILITER(S): 9 INJECTION INTRAMUSCULAR; INTRAVENOUS; SUBCUTANEOUS at 12:24

## 2019-06-01 NOTE — ED ADULT NURSE NOTE - NSIMPLEMENTINTERV_GEN_ALL_ED
Implemented All Fall Risk Interventions:  [place] to call system. Call bell, personal items and telephone within reach. Instruct patient to call for assistance. Room bathroom lighting operational. Non-slip footwear when patient is off stretcher. Physically safe environment: no spills, clutter or unnecessary equipment. Stretcher in lowest position, wheels locked, appropriate side rails in place. Provide visual cue, wrist band, yellow gown, etc. Monitor gait and stability. Monitor for mental status changes and reorient to person, place, and time. Review medications for side effects contributing to fall risk. Reinforce activity limits and safety measures with patient and family.

## 2019-06-01 NOTE — H&P ADULT - NSHPSOCIALHISTORY_GEN_ALL_CORE
lives at home with 24 hour aide  ambulates independently, has walker at home but only uses it occasionally  denies current tobacco use, quit over 20-30 years ago  denies alcohol or illicit drug abuse

## 2019-06-01 NOTE — H&P ADULT - PROBLEM SELECTOR PLAN 2
- noted ST inversion on leads V3-V6 when compared to previous on on 5/28  - patient asymptomatic, VSS  - will f/u CE x3  - f/u TTE  - f/u cardio rec Dr. Moreira  - admit to telemetry for monitoring  - continue atorvastatin, plavix, and asa 81

## 2019-06-01 NOTE — H&P ADULT - PROBLEM SELECTOR PLAN 1
- recent episodes of frequent TIAs -  was admitted to [facility] on 5/29-5/31 for TIA workup, reported MRI performed there and was told it showed multiple mini-strokes.   - Reported appx 5 episodes of TIA symptoms over past 6 weeks, lasting usually 15 minutes.   - was discharged from [facility] with atorvastatin and plavix  - currently symptomatically improved on evaluation in ED, CT head neg for acute findings  - continue atorvastatin and plavix   - f/u neurology consult Dr. Carrington  - f/u TTE, carotid dopplers  - f/u HgA1c, thyroid panel, lipid panel  - admit to telemetry to monitor for any underlying undiagnosed arrhythmias   - BP currently stable, will hold bp meds to allow for permissive htn pending neuro rec  - fall precaution  - PT eval  - speech and swallow eval - recent episodes of frequent TIAs -  was admitted to [facility] on 5/29-5/31 for TIA workup, reported MRI performed there and was told it showed multiple mini-strokes.   - Reported appx 5 episodes of TIA symptoms over past 6 weeks, lasting usually 15 minutes.   - of note, patient was on testosterone therapy at home - patient and family counseled on stopping testosterone at this time to avoid increasing risks of further CVA and ACS events   - was discharged from [facility] with atorvastatin and plavix  - currently symptomatically improved on evaluation in ED, CT head neg for acute findings  - continue atorvastatin and plavix   - f/u neurology consult Dr. Carrington  - f/u TTE, carotid dopplers  - f/u HgA1c, thyroid panel, lipid panel  - f/u b/l LE doppler  - admit to telemetry to monitor for any underlying undiagnosed arrhythmias   - BP currently stable, will hold bp meds to allow for permissive htn pending neuro rec  - fall precaution  - PT eval  - speech and swallow eval

## 2019-06-01 NOTE — H&P ADULT - PROBLEM SELECTOR PLAN 8
- chronic UTI on vantin at home chronically   - will continue vantin  - continue terazosin - therapeutic exchange with doxazosin   - straight cath at home TID - will continue

## 2019-06-01 NOTE — H&P ADULT - HISTORY OF PRESENT ILLNESS
Patient is a 84 yo male with pmhx of rectal cancer, RBBB, urinary retention requiring self cath, DM 2 on insulin, chronic UTI, HTN, asthma, IBS, GERD, recent frequent TIA symptoms presenting to ED with symptoms of R sided weakness, slurred speech, right facial droop. Patient was recently seen at [facility] ED on 5/29/19 for symptoms of slurred speech and right leg weakness, family requested transfer to [facility] and was admitted there for evaluation of TIA from 5/29-5/31. Per daughter at bedside, patient was discharged from [facility] yesterday with lipitor and plavix. This AM, home aide noted patient suddenly slumped while sitting after his shower, unable to answer questions and unable to move his right side. Patient's symptom started to resolve while in the ambulance, able to communicate with family. After arrival in ED, patient able to move all 4 extremities and communicate with mild residual slurred speech and mild right sided facial droop. Per family at bedside, episode lasted about 1 hour prior to improvement of symptoms. Daughter at bedside reported patient had about 5 episodes of TIA like symptoms over past 6 weeks. Denied having any previous episodes in the past. Patient denied any headache, visual changes, LOC, dizziness, chest pain, palpitations, TAYLOR, SOB, abdominal pain, nausea, vomiting, changes in BM or urinary symptoms, recent illness, recent fevers.     In the ED, patient VSS, afebrile  no leukocytosis, H/H wnl, plt wnl   Na 139, K 3.5, BUN 22, Cr 1.6, glucose 145, GFR 39  UA neg  CT brain neg for acute findings (unchanged from previous on 5/28)  EKG showed sinus bradycardia at 59bpm with RBBB, 1st degress AV block. Also noted t wave inversion in leads V3, V5, V6 which appeared to be new compared to 5/28.   Received plavix 1x and NS bolus x1 in ED Patient is a 86 yo male with pmhx of rectal cancer, RBBB, urinary retention requiring self cath, DM 2 on insulin, chronic UTI, HTN, asthma, IBS, GERD, spinal stenosis, recent frequent TIA symptoms presenting to ED with symptoms of R sided weakness, slurred speech, right facial droop. Patient was recently seen at [facility] ED on 5/29/19 for symptoms of slurred speech and right leg weakness, family requested transfer to [place] and was admitted there for evaluation of TIA from 5/29-5/31. Per daughter at bedside, patient was discharged from [place] yesterday with lipitor and plavix. This AM, home aide noted patient suddenly slumped while sitting after his shower, unable to answer questions and unable to move his right side. Patient's symptom started to resolve while in the ambulance, able to communicate with family. After arrival in ED, patient able to move all 4 extremities and communicate with mild residual slurred speech and mild right sided facial droop. Per family at bedside, episode lasted about 1 hour prior to improvement of symptoms. Daughter at bedside reported patient had about 5 episodes of TIA like symptoms over past 6 weeks. Denied having any previous episodes in the past. Patient denied any headache, visual changes, LOC, dizziness, chest pain, palpitations, TAYLOR, SOB, abdominal pain, nausea, vomiting, changes in BM or urinary symptoms, recent illness, recent fevers.     In the ED, patient VSS, afebrile  no leukocytosis, H/H wnl, plt wnl   Na 139, K 3.5, BUN 22, Cr 1.6, glucose 145, GFR 39  UA neg  CT brain neg for acute findings (unchanged from previous on 5/28)  EKG showed sinus bradycardia at 59bpm with RBBB, 1st degress AV block. Also noted t wave inversion in leads V3, V5, V6 which appeared to be new compared to 5/28.   Received plavix 1x and NS bolus x1 in ED

## 2019-06-01 NOTE — H&P ADULT - ATTENDING COMMENTS
86 yo male with pmhx of rectal cancer, RBBB, urinary retention requiring self cath, DM 2 on insulin, chronic UTI, HTN, asthma, IBS, GERD, recent frequent TIA symptoms presenting to ED with symptoms of R sided weakness, slurred speech, right facial droop. Admit for evaluation of TIA, new EKG changes.  Plan: monitor on tele, apprec cardio and neuro recs, monitor clinical course, PT eval, neuro checks, check echo, dopplers, request MR head records from [place]

## 2019-06-01 NOTE — INPATIENT CERTIFICATION FOR MEDICARE PATIENTS - PHYSICIAN CONCUR
Patients Daughter Emliza upset that her mother is leaving this late and it is unacceptable. She would like her mother to stay another night as she is confused. I explained to her that her daughter has been confused the whole time while she has been here and she also has a UTI. Shante CAMPBELL notified and they will re-evaluate in the morning. Transport cancelled and [facility] called and made aware of situation. Will continue to monitor and reassess.  Electronically signed by Carla Brush RN on 5/6/2019 at 10:13 PM I concur with the Admission Order and I certify that services are provided in accordance with Section 42 CFR § 412.3

## 2019-06-01 NOTE — ED ADULT NURSE REASSESSMENT NOTE - NS ED NURSE REASSESS COMMENT FT1
pt straight cath per his daily routine...sent to lab per order...Pt placed on hospital bed for comfort

## 2019-06-01 NOTE — H&P ADULT - PROBLEM SELECTOR PLAN 9
- uses enema daily for BM - continue  - continue home med sulfasalazine   - on home med Librax - will need to bring from home  - also on home med opium tincture to control GI motility - will hold while in hospital

## 2019-06-01 NOTE — H&P ADULT - NSHPPHYSICALEXAM_GEN_ALL_CORE
Physical Exam:  General: Well developed, well nourished, NAD, appears comfortable  HEENT: NCAT, PERRL, EOMI bl, dry mucous membranes   Neck: Supple, no JVD  Neurology: A&Ox3, CN II-XII grossly intact, sensation intact, mild right sided facial droop which is improving per family, speech intact, strength 5/5 b/l UE and LE.   Respiratory: CTA B/L, No W/R/R  CV: RRR, +S1/S2  Abdominal: Soft, NT, ND +BSx4  Extremities: No C/C/E, + peripheral pulses  Skin: warm, dry, normal color

## 2019-06-01 NOTE — ED ADULT NURSE NOTE - PMH
Asthma    Cancer of rectum    Hypertension    RBBB (right bundle branch block) Asthma    Cancer of rectum    Enlarged prostate    Hypertension    RBBB (right bundle branch block)    TIA (transient ischemic attack)    UTI (urinary tract infection)

## 2019-06-01 NOTE — H&P ADULT - PROBLEM SELECTOR PLAN 4
- VANDA on admission - will hold lasix and enalapril for now to allow for permissive htn  - resume when renal fxn improves and pending neuro rec  - avoid nephrotoxic med - VANDA on admission - will hold lasix and enalapril for now to allow for permissive htn  - resume when renal fxn improves and pending neuro rec  - avoid nephrotoxic med  -gentle hydration 75ml/hr

## 2019-06-01 NOTE — H&P ADULT - NSHPREVIEWOFSYSTEMS_GEN_ALL_CORE
Constitutional: denies fever, chills, diaphoresis   HEENT: denies blurry vision, difficulty hearing  Respiratory: denies SOB, TAYLOR, cough, sputum production, wheezing, hemoptysis  Cardiovascular: denies CP, palpitations. Reports chronic mild LE edema  Gastrointestinal: denies nausea, vomiting, changes in BM, abdominal pain, melena, hematochezia   Genitourinary: denies dysuria, hematuria, reports self cath TID  Musculoskeletal: reported right sided weakness which resolved  Neurologic: reported right sided weakness, slurred speech, right facial droop, which improved after arrival to ED, denies headache, dizziness, paresthesias, numbness/tingling  ROS negative except as noted above

## 2019-06-01 NOTE — H&P ADULT - PROBLEM/PLAN-1
DISPLAY PLAN FREE TEXT
Patient baseline mental status/Alert and oriented to person, place and time/Awake

## 2019-06-01 NOTE — INPATIENT CERTIFICATION FOR MEDICARE PATIENTS - RISKS OF ADVERSE EVENTS
Concern for delay in diagnosis and treatment/Concern for neurologic deterioration/Concern for cardiopulmonary deterioration/Other:

## 2019-06-01 NOTE — H&P ADULT - ASSESSMENT
Patient is a 84 yo male with pmhx of rectal cancer, RBBB, urinary retention requiring self cath, DM 2 on insulin, chronic UTI, HTN, asthma, IBS, GERD, recent frequent TIA symptoms presenting to ED with symptoms of R sided weakness, slurred speech, right facial droop. Admit for evaluation of TIA, new EKG changes.

## 2019-06-01 NOTE — H&P ADULT - PROBLEM SELECTOR PLAN 3
- hx of RBBB, noted to be borderline bradycardic on EKG   - patient asymptomatic, follows regularly with outpatient cardio  - home med nadolol held to maintain permissive htn  - resume per cardio and neuro rec

## 2019-06-01 NOTE — ED ADULT NURSE NOTE - NS ED NURSE REPORT GIVEN TO FT
details… No joint pain, swelling or deformity; no limitation of movement catrachito terrell detailed exam normal strength/ROM intact

## 2019-06-01 NOTE — ED ADULT NURSE NOTE - OBJECTIVE STATEMENT
received pt in bed #14a Pt A&O states he had got out of the shower & became weak on right side, developed facial droop & could not speak x 1/2 hour Upon arrival to ED pt's symptoms resolved. Pt states he was recently admitted to [facility] for same symptoms & dc TIA last night. Dr Murrieta @ bedside & pt immediately to CT scan. Upon arrival back to ED Pt placed on CM. Pt resting comf Will monitor received pt in bed #14a Pt A&O states he had got out of the shower & became weak on right side, developed facial droop & could not speak x 1/2 hour Upon arrival to ED pt's symptoms resolved except for slight right facial droop & slight slurred speech. Pt states he was recently admitted to [facility] for same symptoms & dx w/ TIA & d/c'd last night. Dr Murrieta @ bedside & pt immediately to CT scan. Upon arrival back to ED Pt placed on CM. Pt resting comf Will monitor

## 2019-06-01 NOTE — H&P ADULT - PROBLEM SELECTOR PLAN 6
- on home med nadolol, enalapril, lasix   - will hold BP meds for now to allow for permissive htn   - patient reported lasix is for LE edema, not for CHF

## 2019-06-01 NOTE — H&P ADULT - NSHPOUTPATIENTPROVIDERS_GEN_ALL_CORE
Dr. Cervantes PMD/GI at [facility] [facility]  Dr. Gaytan Cardio at [facility] [facility]  Dr. Humberto Lynn at [facility] [facility]  Dr. Painter ID at [facility] [facility]

## 2019-06-01 NOTE — ED PROVIDER NOTE - OBJECTIVE STATEMENT
86 y/o M brought in by ambulance for right upper and lower extremity weakness and facial droop.  Pt with recent hx of TIA.  Pt was discharged from [place] yesterday where he was evaluated x 2 days for TIA.  Pt was seen 84 y/o M brought in by ambulance for right upper and lower extremity weakness and facial droop.  Pt with recent hx of TIA.  Pt was discharged from [place] yesterday where he was evaluated x 2 days for TIA.  Pt was seen at [facility] 3 days ago and left AMA to go to [facility].  Last known well 10:30 am noticed by son.  Symptoms resolved in the ED at 11:. 86 y/o M brought in by ambulance for right upper and lower extremity weakness, aphasia and facial droop.  Pt with recent hx of TIA.  Pt was discharged from [place] yesterday where he was evaluated x 2 days for TIA.  Pt was seen at [facility] 3 days ago and left AMA to go to [facility].  Last known well 10:30 am noticed by son.  Symptoms resolved in the ED at 11:30 84 y/o M brought in by ambulance for right upper and lower extremity weakness, aphasia and facial droop.  Pt with recent hx of TIA.  Pt was discharged from [place] yesterday where he was evaluated x 2 days for TIA.  Pt was seen at [facility] 3 days ago and left AMA to go to [facility].  Last known well 10:30 am noticed by son.  Symptoms resolved in the ED at 11:30    PCP: Dr. Cervantes

## 2019-06-01 NOTE — H&P ADULT - NSICDXPASTMEDICALHX_GEN_ALL_CORE_FT
PAST MEDICAL HISTORY:  Asthma     Cancer of rectum     Chronic GERD     Enlarged prostate     Hypertension     RBBB (right bundle branch block)     Spinal stenosis     TIA (transient ischemic attack)     Urinary retention with incomplete bladder emptying     UTI (urinary tract infection)

## 2019-06-01 NOTE — ED ADULT TRIAGE NOTE - CHIEF COMPLAINT QUOTE
Per EMS pt was at home and had sudden onset of weakness, right facial droop and right arm weakness which started at 1030.

## 2019-06-01 NOTE — H&P ADULT - PROBLEM SELECTOR PLAN 5
- on home lantus   - HERLINDA, routine accuchecks, hypoglycemic protocol, consistent carb diet - on home lantus - will hold for now since patient's glucose appeared to be well controlled  - HERLINDA, routine accuchecks, hypoglycemic protocol, consistent carb diet

## 2019-06-02 LAB
ALBUMIN SERPL ELPH-MCNC: 3.2 G/DL — LOW (ref 3.3–5)
ALP SERPL-CCNC: 64 U/L — SIGNIFICANT CHANGE UP (ref 40–120)
ALT FLD-CCNC: 26 U/L — SIGNIFICANT CHANGE UP (ref 12–78)
ANION GAP SERPL CALC-SCNC: 9 MMOL/L — SIGNIFICANT CHANGE UP (ref 5–17)
AST SERPL-CCNC: 30 U/L — SIGNIFICANT CHANGE UP (ref 15–37)
BILIRUB SERPL-MCNC: 0.6 MG/DL — SIGNIFICANT CHANGE UP (ref 0.2–1.2)
BUN SERPL-MCNC: 19 MG/DL — SIGNIFICANT CHANGE UP (ref 7–23)
CALCIUM SERPL-MCNC: 9.4 MG/DL — SIGNIFICANT CHANGE UP (ref 8.5–10.1)
CHLORIDE SERPL-SCNC: 104 MMOL/L — SIGNIFICANT CHANGE UP (ref 96–108)
CHOLEST SERPL-MCNC: 164 MG/DL — SIGNIFICANT CHANGE UP (ref 10–199)
CK MB BLD-MCNC: 1.5 % — SIGNIFICANT CHANGE UP (ref 0–3.5)
CK MB CFR SERPL CALC: 2.7 NG/ML — SIGNIFICANT CHANGE UP (ref 0–3.6)
CK SERPL-CCNC: 185 U/L — SIGNIFICANT CHANGE UP (ref 26–308)
CO2 SERPL-SCNC: 28 MMOL/L — SIGNIFICANT CHANGE UP (ref 22–31)
CREAT SERPL-MCNC: 1.2 MG/DL — SIGNIFICANT CHANGE UP (ref 0.5–1.3)
CULTURE RESULTS: NO GROWTH — SIGNIFICANT CHANGE UP
GLUCOSE SERPL-MCNC: 91 MG/DL — SIGNIFICANT CHANGE UP (ref 70–99)
HBA1C BLD-MCNC: 6.7 % — HIGH (ref 4–5.6)
HCT VFR BLD CALC: 45 % — SIGNIFICANT CHANGE UP (ref 39–50)
HDLC SERPL-MCNC: 24 MG/DL — LOW
HGB BLD-MCNC: 14.7 G/DL — SIGNIFICANT CHANGE UP (ref 13–17)
LIPID PNL WITH DIRECT LDL SERPL: 99 MG/DL — SIGNIFICANT CHANGE UP
MAGNESIUM SERPL-MCNC: 2.1 MG/DL — SIGNIFICANT CHANGE UP (ref 1.6–2.6)
MCHC RBC-ENTMCNC: 28.1 PG — SIGNIFICANT CHANGE UP (ref 27–34)
MCHC RBC-ENTMCNC: 32.7 GM/DL — SIGNIFICANT CHANGE UP (ref 32–36)
MCV RBC AUTO: 85.9 FL — SIGNIFICANT CHANGE UP (ref 80–100)
NRBC # BLD: 0 /100 WBCS — SIGNIFICANT CHANGE UP (ref 0–0)
PLATELET # BLD AUTO: 139 K/UL — LOW (ref 150–400)
POTASSIUM SERPL-MCNC: 3.1 MMOL/L — LOW (ref 3.5–5.3)
POTASSIUM SERPL-SCNC: 3.1 MMOL/L — LOW (ref 3.5–5.3)
PROT SERPL-MCNC: 6.7 G/DL — SIGNIFICANT CHANGE UP (ref 6–8.3)
RBC # BLD: 5.24 M/UL — SIGNIFICANT CHANGE UP (ref 4.2–5.8)
RBC # FLD: 13.7 % — SIGNIFICANT CHANGE UP (ref 10.3–14.5)
SODIUM SERPL-SCNC: 141 MMOL/L — SIGNIFICANT CHANGE UP (ref 135–145)
SPECIMEN SOURCE: SIGNIFICANT CHANGE UP
T3 SERPL-MCNC: 91 NG/DL — SIGNIFICANT CHANGE UP (ref 80–200)
T4 AB SER-ACNC: 7.9 UG/DL — SIGNIFICANT CHANGE UP (ref 4.6–12)
TOTAL CHOLESTEROL/HDL RATIO MEASUREMENT: 6.8 RATIO — SIGNIFICANT CHANGE UP (ref 3.4–9.6)
TRIGL SERPL-MCNC: 206 MG/DL — HIGH (ref 10–149)
TROPONIN I SERPL-MCNC: <.015 NG/ML — SIGNIFICANT CHANGE UP (ref 0.01–0.04)
TSH SERPL-MCNC: 0.32 UIU/ML — LOW (ref 0.36–3.74)
WBC # BLD: 5.55 K/UL — SIGNIFICANT CHANGE UP (ref 3.8–10.5)
WBC # FLD AUTO: 5.55 K/UL — SIGNIFICANT CHANGE UP (ref 3.8–10.5)

## 2019-06-02 PROCEDURE — 93306 TTE W/DOPPLER COMPLETE: CPT | Mod: 26

## 2019-06-02 PROCEDURE — 93970 EXTREMITY STUDY: CPT | Mod: 26

## 2019-06-02 PROCEDURE — 93010 ELECTROCARDIOGRAM REPORT: CPT

## 2019-06-02 PROCEDURE — 99233 SBSQ HOSP IP/OBS HIGH 50: CPT

## 2019-06-02 PROCEDURE — 99223 1ST HOSP IP/OBS HIGH 75: CPT

## 2019-06-02 RX ORDER — POTASSIUM CHLORIDE 20 MEQ
40 PACKET (EA) ORAL ONCE
Refills: 0 | Status: COMPLETED | OUTPATIENT
Start: 2019-06-02 | End: 2019-06-02

## 2019-06-02 RX ADMIN — PANTOPRAZOLE SODIUM 40 MILLIGRAM(S): 20 TABLET, DELAYED RELEASE ORAL at 06:00

## 2019-06-02 RX ADMIN — Medication 81 MILLIGRAM(S): at 13:15

## 2019-06-02 RX ADMIN — Medication 1 ENEMA: at 08:07

## 2019-06-02 RX ADMIN — Medication 200 MILLIGRAM(S): at 17:24

## 2019-06-02 RX ADMIN — Medication 4 MILLIGRAM(S): at 21:50

## 2019-06-02 RX ADMIN — Medication 200 MILLIGRAM(S): at 06:00

## 2019-06-02 RX ADMIN — MOMETASONE FUROATE AND FORMOTEROL FUMARATE DIHYDRATE 2 PUFF(S): 200; 5 AEROSOL RESPIRATORY (INHALATION) at 06:01

## 2019-06-02 RX ADMIN — Medication 500 MILLIGRAM(S): at 13:14

## 2019-06-02 RX ADMIN — Medication 40 MILLIEQUIVALENT(S): at 10:34

## 2019-06-02 RX ADMIN — ATORVASTATIN CALCIUM 80 MILLIGRAM(S): 80 TABLET, FILM COATED ORAL at 21:50

## 2019-06-02 RX ADMIN — HEPARIN SODIUM 5000 UNIT(S): 5000 INJECTION INTRAVENOUS; SUBCUTANEOUS at 06:00

## 2019-06-02 RX ADMIN — MOMETASONE FUROATE AND FORMOTEROL FUMARATE DIHYDRATE 2 PUFF(S): 200; 5 AEROSOL RESPIRATORY (INHALATION) at 21:51

## 2019-06-02 RX ADMIN — Medication 1: at 13:15

## 2019-06-02 RX ADMIN — Medication 1 TABLET(S): at 13:15

## 2019-06-02 RX ADMIN — CLOPIDOGREL BISULFATE 75 MILLIGRAM(S): 75 TABLET, FILM COATED ORAL at 13:15

## 2019-06-02 RX ADMIN — HEPARIN SODIUM 5000 UNIT(S): 5000 INJECTION INTRAVENOUS; SUBCUTANEOUS at 17:24

## 2019-06-02 NOTE — PROGRESS NOTE ADULT - SUBJECTIVE AND OBJECTIVE BOX
Patient is a 85y old  Male who presents with a chief complaint of right sided weakness and slurred speech.      INTERVAL HPI/OVERNIGHT EVENTS: This morning pt had recurrence of symptoms of right sided weakness, lightheadedness, flushing, sweatiness, blurring of vision. This occurred while straining to defecate and resolved within 10-15min.     MEDICATIONS  (STANDING):  aspirin enteric coated 81 milliGRAM(s) Oral daily  atorvastatin 80 milliGRAM(s) Oral at bedtime  cefpodoxime 200 milliGRAM(s) Oral every 12 hours  clidinium/chlordiazePOXIDE 1 Capsule(s) Oral two times a day  clopidogrel Tablet 75 milliGRAM(s) Oral daily  dextrose 5%. 1000 milliLiter(s) (50 mL/Hr) IV Continuous <Continuous>  dextrose 50% Injectable 12.5 Gram(s) IV Push once  dextrose 50% Injectable 25 Gram(s) IV Push once  dextrose 50% Injectable 25 Gram(s) IV Push once  doxazosin 4 milliGRAM(s) Oral at bedtime  heparin  Injectable 5000 Unit(s) SubCutaneous every 12 hours  insulin lispro (HumaLOG) corrective regimen sliding scale   SubCutaneous three times a day before meals  insulin lispro (HumaLOG) corrective regimen sliding scale   SubCutaneous at bedtime  mometasone 100 MICROgram(s)/formoterol 5 MICROgram(s) Inhaler 2 Puff(s) Inhalation two times a day  multivitamin 1 Tablet(s) Oral daily  pantoprazole    Tablet 40 milliGRAM(s) Oral before breakfast  sodium chloride 0.9%. 1000 milliLiter(s) (75 mL/Hr) IV Continuous <Continuous>  sulfaSALAzine 500 milliGRAM(s) Oral daily    MEDICATIONS  (PRN):  dextrose 40% Gel 15 Gram(s) Oral once PRN Blood Glucose LESS THAN 70 milliGRAM(s)/deciliter  glucagon  Injectable 1 milliGRAM(s) IntraMuscular once PRN Glucose LESS THAN 70 milligrams/deciliter      Allergies    No Known Allergies    Intolerances        REVIEW OF SYSTEMS:  CONSTITUTIONAL: No fever or chills  HEENT:  No headache, no sore throat  RESPIRATORY: No cough, wheezing, or shortness of breath  CARDIOVASCULAR: No chest pain, palpitations  GASTROINTESTINAL: No abd pain, nausea, vomiting, or diarrhea  GENITOURINARY: No dysuria, frequency, or hematuria  NEUROLOGICAL: +short episode of weakness and lightheadedness this morning   MUSCULOSKELETAL: no myalgias     Vital Signs Last 24 Hrs  T(C): 36.8 (2019 08:39), Max: 36.8 (2019 08:39)  T(F): 98.2 (2019 08:39), Max: 98.2 (2019 08:39)  HR: 88 (2019 08:39) (62 - 88)  BP: 157/93 (2019 08:39) (102/45 - 157/93)  BP(mean): --  RR: 18 (2019 08:39) (15 - 18)  SpO2: 96% (2019 08:39) (92% - 99%)    PHYSICAL EXAM:  GENERAL: NAD, obese  HEENT:  anicteric, moist mucous membranes  CHEST/LUNG:  CTA b/l, no rales, wheezes, or rhonchi  HEART:  RRR, S1, S2  ABDOMEN:  BS+, soft, nontender, nondistended  EXTREMITIES: no edema, cyanosis, or calf tenderness  NERVOUS SYSTEM: AA&OX3, 5/5 str in all 4 extremities, sensation to light touch intact, speech fluent    LABS:                        14.7   5.55  )-----------( 139      ( 2019 06:38 )             45.0                2019 06:38    141    |  104    |  19     ----------------------------<  91     3.1     |  28     |  1.20       Ca    9.4        2019 06:38  Mg     2.1       2019 06:38    TPro  6.7    /  Alb  3.2    /  TBili  0.6    /  DBili  x      /  AST  30     /  ALT  26     /  AlkPhos  64     2019 06:38    Urinalysis Basic - ( 2019 15:11 )    Color: Yellow / Appearance: Clear / S.015 / pH: x  Gluc: x / Ketone: Small  / Bili: Negative / Urobili: Negative   Blood: x / Protein: 150 mg/dL / Nitrite: Negative   Leuk Esterase: Trace / RBC: 0-2 /HPF / WBC 0-2   Sq Epi: x / Non Sq Epi: Occasional / Bacteria: Occasional      CAPILLARY BLOOD GLUCOSE      POCT Blood Glucose.: 156 mg/dL (2019 12:55)  POCT Blood Glucose.: 117 mg/dL (2019 07:36)          Culture - Urine (collected 19 @ 19:49)  Source: .Urine Catheterized  Final Report (19 @ 16:14):    No growth        RADIOLOGY & ADDITIONAL TESTS:  LE Doppler: Unremarkable ultrasound of the bilateral lower extremity deep venous system.    Personally reviewed.     Consultant(s) Notes Reviewed:  [x] YES  [ ] NO Patient is a 85y old  Male who presents with a chief complaint of right sided weakness and slurred speech.    INTERVAL HPI/OVERNIGHT EVENTS: This morning pt had recurrence of symptoms of right sided weakness, lightheadedness, flushing, sweatiness, blurring of vision. This occurred while straining to defecate and resolved within 10-15min.     MEDICATIONS  (STANDING):  aspirin enteric coated 81 milliGRAM(s) Oral daily  atorvastatin 80 milliGRAM(s) Oral at bedtime  cefpodoxime 200 milliGRAM(s) Oral every 12 hours  clidinium/chlordiazePOXIDE 1 Capsule(s) Oral two times a day  clopidogrel Tablet 75 milliGRAM(s) Oral daily  dextrose 5%. 1000 milliLiter(s) (50 mL/Hr) IV Continuous <Continuous>  dextrose 50% Injectable 12.5 Gram(s) IV Push once  dextrose 50% Injectable 25 Gram(s) IV Push once  dextrose 50% Injectable 25 Gram(s) IV Push once  doxazosin 4 milliGRAM(s) Oral at bedtime  heparin  Injectable 5000 Unit(s) SubCutaneous every 12 hours  insulin lispro (HumaLOG) corrective regimen sliding scale   SubCutaneous three times a day before meals  insulin lispro (HumaLOG) corrective regimen sliding scale   SubCutaneous at bedtime  mometasone 100 MICROgram(s)/formoterol 5 MICROgram(s) Inhaler 2 Puff(s) Inhalation two times a day  multivitamin 1 Tablet(s) Oral daily  pantoprazole    Tablet 40 milliGRAM(s) Oral before breakfast  sodium chloride 0.9%. 1000 milliLiter(s) (75 mL/Hr) IV Continuous <Continuous>  sulfaSALAzine 500 milliGRAM(s) Oral daily    MEDICATIONS  (PRN):  dextrose 40% Gel 15 Gram(s) Oral once PRN Blood Glucose LESS THAN 70 milliGRAM(s)/deciliter  glucagon  Injectable 1 milliGRAM(s) IntraMuscular once PRN Glucose LESS THAN 70 milligrams/deciliter      Allergies    No Known Allergies    Intolerances        REVIEW OF SYSTEMS:  CONSTITUTIONAL: No fever or chills  HEENT:  No headache, no sore throat  RESPIRATORY: No cough, wheezing, or shortness of breath  CARDIOVASCULAR: No chest pain, palpitations  GASTROINTESTINAL: No abd pain, nausea, vomiting, or diarrhea  GENITOURINARY: No dysuria, frequency, or hematuria  NEUROLOGICAL: +short episode of weakness and lightheadedness this morning   MUSCULOSKELETAL: no myalgias     Vital Signs Last 24 Hrs  T(C): 36.8 (2019 08:39), Max: 36.8 (2019 08:39)  T(F): 98.2 (2019 08:39), Max: 98.2 (2019 08:39)  HR: 88 (2019 08:39) (62 - 88)  BP: 157/93 (2019 08:39) (102/45 - 157/93)  BP(mean): --  RR: 18 (2019 08:39) (15 - 18)  SpO2: 96% (2019 08:39) (92% - 99%)    PHYSICAL EXAM:  GENERAL: NAD, obese  HEENT:  anicteric, moist mucous membranes  CHEST/LUNG:  CTA b/l, no rales, wheezes, or rhonchi  HEART:  RRR, S1, S2  ABDOMEN:  BS+, soft, nontender, nondistended  EXTREMITIES: no edema, cyanosis, or calf tenderness  NERVOUS SYSTEM: AA&OX3, 5/5 str in all 4 extremities, sensation to light touch intact, speech fluent    LABS:                        14.7   5.55  )-----------( 139      ( 2019 06:38 )             45.0                2019 06:38    141    |  104    |  19     ----------------------------<  91     3.1     |  28     |  1.20       Ca    9.4        2019 06:38  Mg     2.1       2019 06:38    TPro  6.7    /  Alb  3.2    /  TBili  0.6    /  DBili  x      /  AST  30     /  ALT  26     /  AlkPhos  64     2019 06:38    Urinalysis Basic - ( 2019 15:11 )    Color: Yellow / Appearance: Clear / S.015 / pH: x  Gluc: x / Ketone: Small  / Bili: Negative / Urobili: Negative   Blood: x / Protein: 150 mg/dL / Nitrite: Negative   Leuk Esterase: Trace / RBC: 0-2 /HPF / WBC 0-2   Sq Epi: x / Non Sq Epi: Occasional / Bacteria: Occasional      CAPILLARY BLOOD GLUCOSE      POCT Blood Glucose.: 156 mg/dL (2019 12:55)  POCT Blood Glucose.: 117 mg/dL (2019 07:36)          Culture - Urine (collected 19 @ 19:49)  Source: .Urine Catheterized  Final Report (19 @ 16:14):    No growth        RADIOLOGY & ADDITIONAL TESTS:  LE Doppler: Unremarkable ultrasound of the bilateral lower extremity deep venous system.    Personally reviewed.     Consultant(s) Notes Reviewed:  [x] YES  [ ] NO

## 2019-06-02 NOTE — PHYSICAL THERAPY INITIAL EVALUATION ADULT - ADDITIONAL COMMENTS
Patient lives in a private home with 1 flight to 2nd floor; no steps to enter home. Patient states he will be staying on main floor permanently upon d/c. Patient and wife have a 24/7 HHA to assist with ADL's and IADLs.

## 2019-06-02 NOTE — CONSULT NOTE ADULT - ASSESSMENT
The etiology of the patient's symptoms is unclear but there is a suggestion of a vasovagal component given the fact of his symptoms occur usually after self administering an enema. We have extensive discussion concerning this and he will monitor his symptoms closely. He has no evidence for an acute coronary syndrome or other acute cardiac process. He does have a history of stroke documented by imaging per neuro.    Recommend    Continue telemetry    Neuro evaluation    Echocardiography    Further management will be dependent on his clinical course    Replete potassium

## 2019-06-02 NOTE — CHART NOTE - NSCHARTNOTEFT_GEN_A_CORE
Called by RN for pt with symptoms of R sided weakness, slurred speech, right facial droop. Per chart review, pt has had similar episodes recurring daily for past few weeks, with numerous outpt imaging and workup. Pt seen and examined at bedside. On physical exam, pt's neuro exam is grossly WNL w/ CN II-XII intact. Pt stated he had been straining on the toilet prior to onset of these symptoms, and that they had resolved within 15 minutes. Orthostatics noted to be WNL. Suspect component of vasovagal event at this time. Per chart review, pt due for ECHO. Neuro following. RN to call with any changes. Will continue to monitor at this time.     Vital Signs Last 24 Hrs  T(C): 36.8 (02 Jun 2019 08:39), Max: 36.8 (02 Jun 2019 08:39)  T(F): 98.2 (02 Jun 2019 08:39), Max: 98.2 (02 Jun 2019 08:39)  HR: 88 (02 Jun 2019 08:39) (62 - 88)  BP: 157/93 (02 Jun 2019 08:39) (102/45 - 157/93)  BP(mean): --  RR: 18 (02 Jun 2019 08:39) (15 - 18)  SpO2: 96% (02 Jun 2019 08:39) (92% - 99%)

## 2019-06-02 NOTE — PROGRESS NOTE ADULT - SUBJECTIVE AND OBJECTIVE BOX
Neurology follow up note    MILTON WALLER85yMale      Interval History:    Patient events noted was trying to have a enema     MEDICATIONS    aspirin enteric coated 81 milliGRAM(s) Oral daily  atorvastatin 80 milliGRAM(s) Oral at bedtime  cefpodoxime 200 milliGRAM(s) Oral every 12 hours  clidinium/chlordiazePOXIDE 1 Capsule(s) Oral two times a day  clopidogrel Tablet 75 milliGRAM(s) Oral daily  dextrose 40% Gel 15 Gram(s) Oral once PRN  dextrose 5%. 1000 milliLiter(s) IV Continuous <Continuous>  dextrose 50% Injectable 12.5 Gram(s) IV Push once  dextrose 50% Injectable 25 Gram(s) IV Push once  dextrose 50% Injectable 25 Gram(s) IV Push once  doxazosin 4 milliGRAM(s) Oral at bedtime  glucagon  Injectable 1 milliGRAM(s) IntraMuscular once PRN  heparin  Injectable 5000 Unit(s) SubCutaneous every 12 hours  insulin lispro (HumaLOG) corrective regimen sliding scale   SubCutaneous three times a day before meals  insulin lispro (HumaLOG) corrective regimen sliding scale   SubCutaneous at bedtime  mometasone 100 MICROgram(s)/formoterol 5 MICROgram(s) Inhaler 2 Puff(s) Inhalation two times a day  multivitamin 1 Tablet(s) Oral daily  pantoprazole    Tablet 40 milliGRAM(s) Oral before breakfast  sodium chloride 0.9%. 1000 milliLiter(s) IV Continuous <Continuous>  sulfaSALAzine 500 milliGRAM(s) Oral daily      Allergies    No Known Allergies    Intolerances        Height (cm): 180.34 ( @ 11:29)  Weight (kg): 112 ( @ 11:37)  BMI (kg/m2): 34.4 ( @ 11:37)    Vital Signs Last 24 Hrs  T(C): 36.8 (2019 08:39), Max: 36.8 (2019 08:39)  T(F): 98.2 (2019 08:39), Max: 98.2 (2019 08:39)  HR: 88 (2019 08:39) (62 - 88)  BP: 157/93 (2019 08:39) (102/45 - 157/93)  BP(mean): --  RR: 18 (2019 08:39) (15 - 18)  SpO2: 96% (2019 08:39) (92% - 99%)      REVIEW OF SYSTEMS:     Constitutional: No fever, chills, fatigue, weakness  Eyes: no eye pain, visual disturbances, or discharge  ENT:  No difficulty hearing, tinnitus, vertigo; No sinus or throat pain  Neck: No pain or stiffness  Respiratory: No cough, dyspnea, wheezing   Cardiovascular: No chest pain, palpitations,   Gastrointestinal: No abdominal or epigastric pain. No nausea, vomiting  No diarrhea or constipation.   Genitourinary: No dysuria, frequency, hematuria or incontinence  Neurological: No headaches, lightheadedness, vertigo, numbness or tremors  Psychiatric: No depression, anxiety, mood swings or difficulty sleeping  Musculoskeletal: No joint pain or swelling; No muscle, back or extremity pain  Skin: No itching, burning, rashes or lesions   Lymph Nodes: No enlarged glands  Endocrine: No heat or cold intolerance; No hair loss   Allergy and Immunologic: No hives or eczema    On Neurological Examination:    The patient is awake, alert.  Extraocular movements are intact.  Pupils are equal, round, and reactive bilaterally, 3 mm to 2 mm.  The patient is hard of hearing.  Speech was fluent.  Smile symmetric. Motor:  Bilateral upper and lower 4+/5.  Sensory:  Bilateral upper and lower were intact to light touch.  No drift.  Finger-to-nose within normal limits.      Follow simple commands  Follow complex commands  Does not follow commands          GENERAL Exam: Nontoxic , No Acute Distress   	  HEENT:  normocephalic, atraumatic  		  LUNGS: Clear bilaterally  No Wheeze  Decreased bilaterally  	  HEART: Normal S1S2   No murmur RRR        	  GI/ ABDOMEN:  Soft  Non tender    EXTREMITIES:   No Edema  No Clubbing  No Cyanosis     MUSCULOSKELETAL: Normal Range of Motion decreased Range of Motion all 4 extremities   	   SKIN: Normal  No Ecchymosis               LABS:  CBC Full  -  ( 2019 06:38 )  WBC Count : 5.55 K/uL  RBC Count : 5.24 M/uL  Hemoglobin : 14.7 g/dL  Hematocrit : 45.0 %  Platelet Count - Automated : 139 K/uL  Mean Cell Volume : 85.9 fl  Mean Cell Hemoglobin : 28.1 pg  Mean Cell Hemoglobin Concentration : 32.7 gm/dL  Auto Neutrophil # : x  Auto Lymphocyte # : x  Auto Monocyte # : x  Auto Eosinophil # : x  Auto Basophil # : x  Auto Neutrophil % : x  Auto Lymphocyte % : x  Auto Monocyte % : x  Auto Eosinophil % : x  Auto Basophil % : x    Urinalysis Basic - ( 2019 15:11 )    Color: Yellow / Appearance: Clear / S.015 / pH: x  Gluc: x / Ketone: Small  / Bili: Negative / Urobili: Negative   Blood: x / Protein: 150 mg/dL / Nitrite: Negative   Leuk Esterase: Trace / RBC: 0-2 /HPF / WBC 0-2   Sq Epi: x / Non Sq Epi: Occasional / Bacteria: Occasional          141  |  104  |  19  ----------------------------<  91  3.1<L>   |  28  |  1.20    Ca    9.4      2019 06:38  Mg     2.1     06-02    TPro  6.7  /  Alb  3.2<L>  /  TBili  0.6  /  DBili  x   /  AST  30  /  ALT  26  /  AlkPhos  64  06-02    Hemoglobin A1C:     LIVER FUNCTIONS - ( 2019 06:38 )  Alb: 3.2 g/dL / Pro: 6.7 g/dL / ALK PHOS: 64 U/L / ALT: 26 U/L / AST: 30 U/L / GGT: x           Vitamin B12   PT/INR - ( 2019 18:23 )   PT: 14.1 sec;   INR: 1.24 ratio         PTT - ( 2019 11:45 )  PTT:28.3 sec      RADIOLOGY    ANALYSIS AND PLAN:  This is an 85-year-old with an episode of feeling lightheaded, blurry vision, looking pale, warm and sweaty, unresponsive, weakness, and slurred speech.  1.	Clinical impression is most likely recurrent TIA-like symptoms secondary to cerebral hypoperfusion.  It would be highly unlikely for the last few weeks the patient is throwing a clot to the same region causing these above constellation of symptoms that he has been experiencing.  With us, his blood pressure was at 100, and the patient did try to give himself enemas this morning, questionable this could be causing any type of vasovagal phenomenon.  2.	I would recommend to check orthostatics with the patient lying down and standing up.  3.	Telemetry evaluation to rule out underlying arrhythmia.  4.	For questionable TIAs, I would continue the patient on his aspirin and Plavix.  5.	For high cholesterol, continue the patient on statin.  6.	For history of diabetes, strict control of blood sugars.  7.	I spoke with family members.  The patient did have a CTA of the head and neck done at the other institution which showed no  stenosis but did not have an echocardiogram. We will recommend echocardiogram.  8.	I spoke with family members in great detail.  Primary  will be daughter, Lilibeth.  Telephone number [phone].  9.	Greater than 85 minutes of the time was spent with the patient, plan of care, reviewing data, speaking to family, multidisciplinary healthcare team.    Thank you for the courtesy of consultation.    Physical therapy evaluation as tolerated  OOB to chair/ambulation with assistance only if possible.  Advanced care planning was discussed with family.  Pain is accessed and addressed.  Pt was screened for signs of clinical depression. Appropriate referral made.  Risk of falls accessed. Fall prevention and plan of care was discussed with family.  Pt is screened for tobacco and alcohol use. Counseling was done for smoking and alcohol cessation.  Use of narcotic pain meds was discussed. Pt is advised to use narcotic meds wisely and to refrain from over using them.  Plan of care was discussed with family. Questions answered.  Would continue to follow.  Greater than 45 minutes spent in direct patient care reviewing  the notes, lab data/ imaging , discussion with multidisciplinary team. Neurology follow up note    MILTON WALLER85yMale      Interval History:    Patient events noted was trying to have a enema     MEDICATIONS    aspirin enteric coated 81 milliGRAM(s) Oral daily  atorvastatin 80 milliGRAM(s) Oral at bedtime  cefpodoxime 200 milliGRAM(s) Oral every 12 hours  clidinium/chlordiazePOXIDE 1 Capsule(s) Oral two times a day  clopidogrel Tablet 75 milliGRAM(s) Oral daily  dextrose 40% Gel 15 Gram(s) Oral once PRN  dextrose 5%. 1000 milliLiter(s) IV Continuous <Continuous>  dextrose 50% Injectable 12.5 Gram(s) IV Push once  dextrose 50% Injectable 25 Gram(s) IV Push once  dextrose 50% Injectable 25 Gram(s) IV Push once  doxazosin 4 milliGRAM(s) Oral at bedtime  glucagon  Injectable 1 milliGRAM(s) IntraMuscular once PRN  heparin  Injectable 5000 Unit(s) SubCutaneous every 12 hours  insulin lispro (HumaLOG) corrective regimen sliding scale   SubCutaneous three times a day before meals  insulin lispro (HumaLOG) corrective regimen sliding scale   SubCutaneous at bedtime  mometasone 100 MICROgram(s)/formoterol 5 MICROgram(s) Inhaler 2 Puff(s) Inhalation two times a day  multivitamin 1 Tablet(s) Oral daily  pantoprazole    Tablet 40 milliGRAM(s) Oral before breakfast  sodium chloride 0.9%. 1000 milliLiter(s) IV Continuous <Continuous>  sulfaSALAzine 500 milliGRAM(s) Oral daily      Allergies    No Known Allergies    Intolerances        Height (cm): 180.34 ( @ 11:29)  Weight (kg): 112 ( @ 11:37)  BMI (kg/m2): 34.4 ( @ 11:37)    Vital Signs Last 24 Hrs  T(C): 36.8 (2019 08:39), Max: 36.8 (2019 08:39)  T(F): 98.2 (2019 08:39), Max: 98.2 (2019 08:39)  HR: 88 (2019 08:39) (62 - 88)  BP: 157/93 (2019 08:39) (102/45 - 157/93)  BP(mean): --  RR: 18 (2019 08:39) (15 - 18)  SpO2: 96% (2019 08:39) (92% - 99%)      REVIEW OF SYSTEMS:    Positive episode of feeling lightheaded, blurry vision, warm and sweaty, weakness, feeling like he going to fall asleep      On Neurological Examination:    The patient is awake, alert.    Extraocular movements are intact.    Pupils are equal, round, and reactive bilaterally, 3 mm to 2 mm.    The patient is hard of hearing.    Speech was fluent.    Smile symmetric.   Motor:  Bilateral upper and lower 4+/5.    Sensory:  Bilateral upper and lower were intact to light touch.    No drift.  Finger-to-nose within normal limits.      Follow simple commands  Follow complex commands    GENERAL Exam: Nontoxic , No Acute Distress   	  HEENT:  normocephalic, atraumatic  		  LUNGS: Clear bilaterally   	  HEART: Normal S1S2   No murmur RRR        	  GI/ ABDOMEN:  Soft  Non tender    EXTREMITIES:   No Edema  No Clubbing  No Cyanosis   	   SKIN: Normal  No Ecchymosis               LABS:  CBC Full  -  ( 2019 06:38 )  WBC Count : 5.55 K/uL  RBC Count : 5.24 M/uL  Hemoglobin : 14.7 g/dL  Hematocrit : 45.0 %  Platelet Count - Automated : 139 K/uL  Mean Cell Volume : 85.9 fl  Mean Cell Hemoglobin : 28.1 pg  Mean Cell Hemoglobin Concentration : 32.7 gm/dL  Auto Neutrophil # : x  Auto Lymphocyte # : x  Auto Monocyte # : x  Auto Eosinophil # : x  Auto Basophil # : x  Auto Neutrophil % : x  Auto Lymphocyte % : x  Auto Monocyte % : x  Auto Eosinophil % : x  Auto Basophil % : x    Urinalysis Basic - ( 2019 15:11 )    Color: Yellow / Appearance: Clear / S.015 / pH: x  Gluc: x / Ketone: Small  / Bili: Negative / Urobili: Negative   Blood: x / Protein: 150 mg/dL / Nitrite: Negative   Leuk Esterase: Trace / RBC: 0-2 /HPF / WBC 0-2   Sq Epi: x / Non Sq Epi: Occasional / Bacteria: Occasional      -    141  |  104  |  19  ----------------------------<  91  3.1<L>   |  28  |  1.20    Ca    9.4      2019 06:38  Mg     2.1     06-    TPro  6.7  /  Alb  3.2<L>  /  TBili  0.6  /  DBili  x   /  AST  30  /  ALT  26  /  AlkPhos  64  06-02    Hemoglobin A1C:     LIVER FUNCTIONS - ( 2019 06:38 )  Alb: 3.2 g/dL / Pro: 6.7 g/dL / ALK PHOS: 64 U/L / ALT: 26 U/L / AST: 30 U/L / GGT: x           Vitamin B12   PT/INR - ( 2019 18:23 )   PT: 14.1 sec;   INR: 1.24 ratio         PTT - ( 2019 11:45 )  PTT:28.3 sec      RADIOLOGY    ANALYSIS AND PLAN:  This is an 85-year-old with an episode of feeling lightheaded, blurry vision, looking pale, warm and sweaty, unresponsive, weakness, and slurred speech.  1.	Clinical impression is most likely recurrent TIA-like symptoms secondary to cerebral hypoperfusion.  It would be highly unlikely for the last few weeks the patient is throwing a clot to the same region causing these above constellation of symptoms that he has been experiencing again today patient did try to give himself enemas this morning, questionable this could be causing any type of vasovagal phenomenon.  2.	Telemetry evaluation to rule out underlying arrhythmia.  3.	For questionable TIAs, I would continue the patient on his aspirin and Plavix.  4.	For high cholesterol, continue the patient on statin.  5.	For history of diabetes, strict control of blood sugars.  6.	I spoke with family members.  The patient did have a CTA of the head and neck done at the other institution which showed no  stenosis but did not have an echocardiogram. We will recommend echocardiogram.  7.	I spoke with family members in great detail.  Primary  will be daughterLilibeth.  Telephone number [phone]  8.	plan of EEG  rec GI and psych evaluation  9.	Greater than 45 minutes of the time was spent with the patient, plan of care, reviewing data, speaking to family, multidisciplinary healthcare team.    Thank you for the courtesy of consultation.    Physical therapy evaluation as tolerated  OOB to chair/ambulation with assistance only if possible.

## 2019-06-03 ENCOUNTER — TRANSCRIPTION ENCOUNTER (OUTPATIENT)
Age: 85
End: 2019-06-03

## 2019-06-03 VITALS
OXYGEN SATURATION: 98 % | HEART RATE: 98 BPM | TEMPERATURE: 99 F | DIASTOLIC BLOOD PRESSURE: 119 MMHG | RESPIRATION RATE: 18 BRPM | SYSTOLIC BLOOD PRESSURE: 184 MMHG

## 2019-06-03 DIAGNOSIS — K59.00 CONSTIPATION, UNSPECIFIED: ICD-10-CM

## 2019-06-03 LAB
ANION GAP SERPL CALC-SCNC: 6 MMOL/L — SIGNIFICANT CHANGE UP (ref 5–17)
BUN SERPL-MCNC: 13 MG/DL — SIGNIFICANT CHANGE UP (ref 7–23)
CALCIUM SERPL-MCNC: 9.7 MG/DL — SIGNIFICANT CHANGE UP (ref 8.5–10.1)
CHLORIDE SERPL-SCNC: 103 MMOL/L — SIGNIFICANT CHANGE UP (ref 96–108)
CO2 SERPL-SCNC: 33 MMOL/L — HIGH (ref 22–31)
CREAT SERPL-MCNC: 1.2 MG/DL — SIGNIFICANT CHANGE UP (ref 0.5–1.3)
GLUCOSE SERPL-MCNC: 123 MG/DL — HIGH (ref 70–99)
HCT VFR BLD CALC: 49.8 % — SIGNIFICANT CHANGE UP (ref 39–50)
HGB BLD-MCNC: 16 G/DL — SIGNIFICANT CHANGE UP (ref 13–17)
MAGNESIUM SERPL-MCNC: 2.3 MG/DL — SIGNIFICANT CHANGE UP (ref 1.6–2.6)
MCHC RBC-ENTMCNC: 27.6 PG — SIGNIFICANT CHANGE UP (ref 27–34)
MCHC RBC-ENTMCNC: 32.1 GM/DL — SIGNIFICANT CHANGE UP (ref 32–36)
MCV RBC AUTO: 86 FL — SIGNIFICANT CHANGE UP (ref 80–100)
NRBC # BLD: 0 /100 WBCS — SIGNIFICANT CHANGE UP (ref 0–0)
PHOSPHATE SERPL-MCNC: 3 MG/DL — SIGNIFICANT CHANGE UP (ref 2.5–4.5)
PLATELET # BLD AUTO: 159 K/UL — SIGNIFICANT CHANGE UP (ref 150–400)
POTASSIUM SERPL-MCNC: 3.4 MMOL/L — LOW (ref 3.5–5.3)
POTASSIUM SERPL-SCNC: 3.4 MMOL/L — LOW (ref 3.5–5.3)
RBC # BLD: 5.79 M/UL — SIGNIFICANT CHANGE UP (ref 4.2–5.8)
RBC # FLD: 13.6 % — SIGNIFICANT CHANGE UP (ref 10.3–14.5)
SODIUM SERPL-SCNC: 142 MMOL/L — SIGNIFICANT CHANGE UP (ref 135–145)
T3 SERPL-MCNC: 102 NG/DL — SIGNIFICANT CHANGE UP (ref 80–200)
T4 FREE SERPL-MCNC: 1.4 NG/DL — SIGNIFICANT CHANGE UP (ref 0.9–1.8)
WBC # BLD: 5.37 K/UL — SIGNIFICANT CHANGE UP (ref 3.8–10.5)
WBC # FLD AUTO: 5.37 K/UL — SIGNIFICANT CHANGE UP (ref 3.8–10.5)

## 2019-06-03 PROCEDURE — 92610 EVALUATE SWALLOWING FUNCTION: CPT

## 2019-06-03 PROCEDURE — 99239 HOSP IP/OBS DSCHRG MGMT >30: CPT

## 2019-06-03 PROCEDURE — 99232 SBSQ HOSP IP/OBS MODERATE 35: CPT

## 2019-06-03 RX ORDER — POTASSIUM CHLORIDE 20 MEQ
40 PACKET (EA) ORAL ONCE
Refills: 0 | Status: COMPLETED | OUTPATIENT
Start: 2019-06-03 | End: 2019-06-03

## 2019-06-03 RX ORDER — DOCUSATE SODIUM 100 MG
1 CAPSULE ORAL
Qty: 60 | Refills: 0
Start: 2019-06-03 | End: 2019-07-02

## 2019-06-03 RX ORDER — ATORVASTATIN CALCIUM 80 MG/1
1 TABLET, FILM COATED ORAL
Qty: 0 | Refills: 0 | DISCHARGE

## 2019-06-03 RX ORDER — ATORVASTATIN CALCIUM 80 MG/1
1 TABLET, FILM COATED ORAL
Qty: 0 | Refills: 0 | DISCHARGE
Start: 2019-06-03

## 2019-06-03 RX ORDER — POLYETHYLENE GLYCOL 3350 17 G/17G
17 POWDER, FOR SOLUTION ORAL
Qty: 510 | Refills: 0
Start: 2019-06-03 | End: 2019-07-02

## 2019-06-03 RX ADMIN — Medication 40 MILLIEQUIVALENT(S): at 09:15

## 2019-06-03 RX ADMIN — Medication 10 MILLIGRAM(S): at 14:59

## 2019-06-03 RX ADMIN — CLOPIDOGREL BISULFATE 75 MILLIGRAM(S): 75 TABLET, FILM COATED ORAL at 13:01

## 2019-06-03 RX ADMIN — HEPARIN SODIUM 5000 UNIT(S): 5000 INJECTION INTRAVENOUS; SUBCUTANEOUS at 05:30

## 2019-06-03 RX ADMIN — Medication 500 MILLIGRAM(S): at 13:03

## 2019-06-03 RX ADMIN — Medication 81 MILLIGRAM(S): at 13:01

## 2019-06-03 RX ADMIN — Medication 200 MILLIGRAM(S): at 05:30

## 2019-06-03 RX ADMIN — Medication 1 TABLET(S): at 13:01

## 2019-06-03 RX ADMIN — PANTOPRAZOLE SODIUM 40 MILLIGRAM(S): 20 TABLET, DELAYED RELEASE ORAL at 05:30

## 2019-06-03 NOTE — PROGRESS NOTE ADULT - SUBJECTIVE AND OBJECTIVE BOX
NP Progress Note     [facility] -- Yusra Chapman, Harish, Evita, Quan David Savella  Office # [phone]      Follow Up: Rt. sided weakness slurred speech     HPI:  Patient is a 86 yo male with pmhx of rectal cancer, RBBB, urinary retention requiring self cath, DM 2 on insulin, chronic UTI, HTN, asthma, IBS, GERD, spinal stenosis, recent frequent TIA symptoms presenting to ED with symptoms of R sided weakness, slurred speech, right facial droop. Patient was recently seen at [facility] ED on 5/29/19 for symptoms of slurred speech and right leg weakness, family requested transfer to [place] and was admitted there for evaluation of TIA from 5/29-5/31. Per daughter at bedside, patient was discharged from [place] yesterday with lipitor and plavix. This AM, home aide noted patient suddenly slumped while sitting after his shower, unable to answer questions and unable to move his right side. Patient's symptom started to resolve while in the ambulance, able to communicate with family. After arrival in ED, patient able to move all 4 extremities and communicate with mild residual slurred speech and mild right sided facial droop. Per family at bedside, episode lasted about 1 hour prior to improvement of symptoms. Daughter at bedside reported patient had about 5 episodes of TIA like symptoms over past 6 weeks. Denied having any previous episodes in the past. Patient denied any headache, visual changes, LOC, dizziness, chest pain, palpitations, TAYLOR, SOB, abdominal pain, nausea, vomiting, changes in BM or urinary symptoms, recent illness, recent fevers.     In the ED, patient VSS, afebrile  no leukocytosis, H/H wnl, plt wnl   Na 139, K 3.5, BUN 22, Cr 1.6, glucose 145, GFR 39  UA neg  CT brain neg for acute findings (unchanged from previous on 5/28)  EKG showed sinus bradycardia at 59bpm with RBBB, 1st degress AV block. Also noted t wave inversion in leads V3, V5, V6 which appeared to be new compared to 5/28.   Received plavix 1x and NS bolus x1 in ED (01 Jun 2019 16:10)        Subjective/Observations: Pt. seen and examined and evaluated. Pt. resting comfortably in bed in NAD, with no respiratory distress, no chest pain, dyspnea, palpitations, PND, or orthopnea.      REVIEW OF SYSTEMS: All other review of systems is negative unless indicated above    PAST MEDICAL & SURGICAL HISTORY:  Chronic GERD  Spinal stenosis  Urinary retention with incomplete bladder emptying  Enlarged prostate  UTI (urinary tract infection)  TIA (transient ischemic attack)  Asthma  RBBB (right bundle branch block)  Cancer of rectum  Hypertension  S/P TURP  Rectal tumor      MEDICATIONS  (STANDING):  aspirin enteric coated 81 milliGRAM(s) Oral daily  atorvastatin 80 milliGRAM(s) Oral at bedtime  cefpodoxime 200 milliGRAM(s) Oral every 12 hours  clidinium/chlordiazePOXIDE 1 Capsule(s) Oral two times a day  clopidogrel Tablet 75 milliGRAM(s) Oral daily  dextrose 5%. 1000 milliLiter(s) (50 mL/Hr) IV Continuous <Continuous>  dextrose 50% Injectable 12.5 Gram(s) IV Push once  dextrose 50% Injectable 25 Gram(s) IV Push once  dextrose 50% Injectable 25 Gram(s) IV Push once  doxazosin 4 milliGRAM(s) Oral at bedtime  heparin  Injectable 5000 Unit(s) SubCutaneous every 12 hours  insulin lispro (HumaLOG) corrective regimen sliding scale   SubCutaneous three times a day before meals  insulin lispro (HumaLOG) corrective regimen sliding scale   SubCutaneous at bedtime  mometasone 100 MICROgram(s)/formoterol 5 MICROgram(s) Inhaler 2 Puff(s) Inhalation two times a day  multivitamin 1 Tablet(s) Oral daily  pantoprazole    Tablet 40 milliGRAM(s) Oral before breakfast  sodium chloride 0.9%. 1000 milliLiter(s) (75 mL/Hr) IV Continuous <Continuous>  sulfaSALAzine 500 milliGRAM(s) Oral daily    MEDICATIONS  (PRN):  dextrose 40% Gel 15 Gram(s) Oral once PRN Blood Glucose LESS THAN 70 milliGRAM(s)/deciliter  glucagon  Injectable 1 milliGRAM(s) IntraMuscular once PRN Glucose LESS THAN 70 milligrams/deciliter      Allergies: No Known Allergies      Vital Signs Last 24 Hrs  T(C): 37.1 (03 Jun 2019 04:26), Max: 37.1 (03 Jun 2019 04:26)  T(F): 98.8 (03 Jun 2019 04:26), Max: 98.8 (03 Jun 2019 04:26)  HR: 71 (03 Jun 2019 04:26) (71 - 88)  BP: 151/85 (03 Jun 2019 04:26) (150/86 - 171/94)  BP(mean): --  RR: 18 (03 Jun 2019 04:26) (18 - 18)  SpO2: 96% (03 Jun 2019 04:26) (94% - 96%)    I&O's Summary    02 Jun 2019 07:01  -  03 Jun 2019 07:00  --------------------------------------------------------  IN: 680 mL / OUT: 1279 mL / NET: -599 mL          LABS: All Labs Reviewed:                        16.0   5.37  )-----------( 159      ( 03 Jun 2019 06:14 )             49.8               03 Jun 2019 06:14    142    |  103    |  13     ----------------------------<  123    3.4     |  33     |  1.20     Ca    9.7        03 Jun 2019 06:14  Phos  3.0       03 Jun 2019 06:14  Mg     2.3       03 Jun 2019 06:14         PTT - ( 01 Jun 2019 11:45 )  PTT:28.3 sec  CARDIAC MARKERS ( 02 Jun 2019 00:17 )  <.015 ng/mL / x     / 185 U/L / x     / 2.7 ng/mL  CARDIAC MARKERS ( 01 Jun 2019 18:23 )  <.015 ng/mL / x     / 202 U/L / x     / 2.9 ng/mL           Echo:      Imaging:  < from: CT Brain Stroke Protocol (06.01.19 @ 11:54) >  IMPRESSION:       No parenchymal contusion, hemorrhage or extra-axial collection.    No CT evidence of an acute territorial infarction.    Chronic small vessel ischemic changes and multiple chronic lacunar   infarcts.      Interpretation of Telemetry: Overnight on telemetry NSR 80's       Physical Exam:  Appearance: [ ] Normal  [ ] abnormal [ ] NAD   Eyes: [ ] PERRL [ ] EOMI  HEENT: [ ] Normal [ ] Abnormal oral mucosa [ ]NC/AT  Cardiovascular: [ ] S1 [ ] S2 [ ] RRR [ ] m/r/g [ ]edema [ ] JVP  Procedural Access Site: [ ]  hematoma [ ] tender to palpation [ ] 2+ pulse [ ] bruit [ ] Ecchymosis  Respiratory: [ ] Clear to auscultation bilaterally  Gastrointestinal: [ ] Soft [ ] tenderness[ ] distension [ ] BS  Musculoskeletal: [ ] clubbing [ ] joint deformity   Neurologic: [ ] Non-focal  Lymphatic: [ ] lymphadenopathy  Psychiatry: [ ] AAOx3  [ ] confused [ ] disoriented [ ] Mood & affect appropriate  Skin: [ ]  rashes [ ] ecchymoses [ ] cyanosis NP Progress Note     [facility] -- Yusra Chapman, Harish, Evita, Quan David Savella  Office # [phone]      Follow Up: Rt. sided weakness slurred speech     HPI:  Patient is a 84 yo male with pmhx of rectal cancer, RBBB, urinary retention requiring self cath, DM 2 on insulin, chronic UTI, HTN, asthma, IBS, GERD, spinal stenosis, recent frequent TIA symptoms presenting to ED with symptoms of R sided weakness, slurred speech, right facial droop. Patient was recently seen at [place] [facility] ED on 5/29/19 for symptoms of slurred speech and right leg weakness, family requested transfer to [place] and was admitted there for evaluation of TIA from 5/29-5/31. Per daughter at bedside, patient was discharged from [place] yesterday with lipitor and plavix. This AM, home aide noted patient suddenly slumped while sitting after his shower, unable to answer questions and unable to move his right side. Patient's symptom started to resolve while in the ambulance, able to communicate with family. After arrival in ED, patient able to move all 4 extremities and communicate with mild residual slurred speech and mild right sided facial droop. Per family at bedside, episode lasted about 1 hour prior to improvement of symptoms. Daughter at bedside reported patient had about 5 episodes of TIA like symptoms over past 6 weeks. Denied having any previous episodes in the past. Patient denied any headache, visual changes, LOC, dizziness, chest pain, palpitations, TAYLOR, SOB, abdominal pain, nausea, vomiting, changes in BM or urinary symptoms, recent illness, recent fevers.     In the ED, patient VSS, afebrile  no leukocytosis, H/H wnl, plt wnl   Na 139, K 3.5, BUN 22, Cr 1.6, glucose 145, GFR 39  UA neg  CT brain neg for acute findings (unchanged from previous on 5/28)  EKG showed sinus bradycardia at 59bpm with RBBB, 1st degress AV block. Also noted t wave inversion in leads V3, V5, V6 which appeared to be new compared to 5/28.   Received plavix 1x and NS bolus x1 in ED (01 Jun 2019 16:10)        Subjective/Observations: Pt. seen and examined and evaluated. Pt. resting comfortably in bed in NAD, with no respiratory distress, no chest pain, dyspnea, palpitations, PND, or orthopnea.      REVIEW OF SYSTEMS: All other review of systems is negative unless indicated above    PAST MEDICAL & SURGICAL HISTORY:  Chronic GERD  Spinal stenosis  Urinary retention with incomplete bladder emptying  Enlarged prostate  UTI (urinary tract infection)  TIA (transient ischemic attack)  Asthma  RBBB (right bundle branch block)  Cancer of rectum  Hypertension  S/P TURP  Rectal tumor      MEDICATIONS  (STANDING):  aspirin enteric coated 81 milliGRAM(s) Oral daily  atorvastatin 80 milliGRAM(s) Oral at bedtime  cefpodoxime 200 milliGRAM(s) Oral every 12 hours  clidinium/chlordiazePOXIDE 1 Capsule(s) Oral two times a day  clopidogrel Tablet 75 milliGRAM(s) Oral daily  dextrose 5%. 1000 milliLiter(s) (50 mL/Hr) IV Continuous <Continuous>  dextrose 50% Injectable 12.5 Gram(s) IV Push once  dextrose 50% Injectable 25 Gram(s) IV Push once  dextrose 50% Injectable 25 Gram(s) IV Push once  doxazosin 4 milliGRAM(s) Oral at bedtime  heparin  Injectable 5000 Unit(s) SubCutaneous every 12 hours  insulin lispro (HumaLOG) corrective regimen sliding scale   SubCutaneous three times a day before meals  insulin lispro (HumaLOG) corrective regimen sliding scale   SubCutaneous at bedtime  mometasone 100 MICROgram(s)/formoterol 5 MICROgram(s) Inhaler 2 Puff(s) Inhalation two times a day  multivitamin 1 Tablet(s) Oral daily  pantoprazole    Tablet 40 milliGRAM(s) Oral before breakfast  sodium chloride 0.9%. 1000 milliLiter(s) (75 mL/Hr) IV Continuous <Continuous>  sulfaSALAzine 500 milliGRAM(s) Oral daily    MEDICATIONS  (PRN):  dextrose 40% Gel 15 Gram(s) Oral once PRN Blood Glucose LESS THAN 70 milliGRAM(s)/deciliter  glucagon  Injectable 1 milliGRAM(s) IntraMuscular once PRN Glucose LESS THAN 70 milligrams/deciliter      Allergies: No Known Allergies      Vital Signs Last 24 Hrs  T(C): 37.1 (03 Jun 2019 04:26), Max: 37.1 (03 Jun 2019 04:26)  T(F): 98.8 (03 Jun 2019 04:26), Max: 98.8 (03 Jun 2019 04:26)  HR: 71 (03 Jun 2019 04:26) (71 - 88)  BP: 151/85 (03 Jun 2019 04:26) (150/86 - 171/94)  BP(mean): --  RR: 18 (03 Jun 2019 04:26) (18 - 18)  SpO2: 96% (03 Jun 2019 04:26) (94% - 96%)    I&O's Summary    02 Jun 2019 07:01  -  03 Jun 2019 07:00  --------------------------------------------------------  IN: 680 mL / OUT: 1279 mL / NET: -599 mL          LABS: All Labs Reviewed:                        16.0   5.37  )-----------( 159      ( 03 Jun 2019 06:14 )             49.8               03 Jun 2019 06:14    142    |  103    |  13     ----------------------------<  123    3.4     |  33     |  1.20     Ca    9.7        03 Jun 2019 06:14  Phos  3.0       03 Jun 2019 06:14  Mg     2.3       03 Jun 2019 06:14         PTT - ( 01 Jun 2019 11:45 )  PTT:28.3 sec  CARDIAC MARKERS ( 02 Jun 2019 00:17 )  <.015 ng/mL / x     / 185 U/L / x     / 2.7 ng/mL  CARDIAC MARKERS ( 01 Jun 2019 18:23 )  <.015 ng/mL / x     / 202 U/L / x     / 2.9 ng/mL           Echo:  < from: TTE Echo Doppler w/o Cont (06.02.19 @ 12:47) >  OBSERVATIONS:  Technically difficult study  Mitral Valve: normal, trace physiologic MR.  Aortic Valve/Aorta: Sclerotic trileaflet aortic valve.  Tricuspid Valve: normal with trace TR.  Pulmonic Valve: normal  Left Atrium: normal  Right Atrium: normal  Left Ventricle: normal LV size and systolic function, estimated LVEF of   55 %. Mild left ventricular hypertrophy  Right Ventricle: normal size and systolic function.  Pericardium/Pleura: normal, no significant pericardial effusion.      Conclusion:   Technically difficult study  Mild concentric LVH with normal internal dimensions and systolic   function, estimated LVEF of 55 %.   Normal RV size and systolic function.   Normal biatrial size.    Sclerotic trileaflet aortic valve, without AI.   Trace physiologic MR and TR.    No significant pericardial effusion.        Imaging:  < from: CT Brain Stroke Protocol (06.01.19 @ 11:54) >  IMPRESSION:       No parenchymal contusion, hemorrhage or extra-axial collection.    No CT evidence of an acute territorial infarction.    Chronic small vessel ischemic changes and multiple chronic lacunar   infarcts.      Interpretation of Telemetry: Overnight on telemetry NSR 80's       Physical Exam:  Appearance: [ ] Normal  [ ] abnormal [X ] NAD   Eyes: [ ] PERRL [ ] EOMI  HEENT: [ ] Normal [ ] Abnormal oral mucosa [ ]NC/AT  Cardiovascular: [X ] S1 [X ] S2 [ ] RRR [ ] m/r/g [ ]edema [ ] JVP  Procedural Access Site: [ ]  hematoma [ ] tender to palpation [ ] 2+ pulse [ ] bruit [ ] Ecchymosis  Respiratory: [X ] Clear to auscultation bilaterally  Gastrointestinal: [ ] Soft [ ] tenderness[ ] distension [ ] BS  Musculoskeletal: [ ] clubbing [ ] joint deformity   Neurologic: [ ] Non-focal  Lymphatic: [ ] lymphadenopathy  Psychiatry: [X ] AAOx3  [ ] confused [ ] disoriented [ ] Mood & affect appropriate  Skin: [ ]  rashes [ ] ecchymoses [ ] cyanosis

## 2019-06-03 NOTE — CONSULT NOTE ADULT - SUBJECTIVE AND OBJECTIVE BOX
[facility] Consultation    CHIEF COMPLAINT: Patient is a 85y old  Male who presents with a chief complaint of right sided weakness and slurred speech (02 Jun 2019 09:33)      HPI:  Patient is a 86 yo male with pmhx of rectal cancer, RBBB, urinary retention requiring self cath, DM 2 on insulin, chronic UTI, HTN, asthma, IBS, GERD, spinal stenosis, recent frequent TIA symptoms presenting to ED with symptoms of R sided weakness, slurred speech, right facial droop. Patient was recently seen at [facility] ED on 5/29/19 for symptoms of slurred speech and right leg weakness, family requested transfer to [place] and was admitted there for evaluation of TIA from 5/29-5/31. Per daughter at bedside, patient was discharged from [place] yesterday with lipitor and plavix. This AM, home aide noted patient suddenly slumped while sitting after his shower, unable to answer questions and unable to move his right side. Patient's symptom started to resolve while in the ambulance, able to communicate with family. After arrival in ED, patient able to move all 4 extremities and communicate with mild residual slurred speech and mild right sided facial droop. Per family at bedside, episode lasted about 1 hour prior to improvement of symptoms. Daughter at bedside reported patient had about 5 episodes of TIA like symptoms over past 6 weeks. Denied having any previous episodes in the past. Patient denied any headache, visual changes, LOC, dizziness, chest pain, palpitations, TAYLOR, SOB, abdominal pain, nausea, vomiting, changes in BM or urinary symptoms, recent illness, recent fevers.     In the ED, patient VSS, afebrile  no leukocytosis, H/H wnl, plt wnl   Na 139, K 3.5, BUN 22, Cr 1.6, glucose 145, GFR 39  UA neg  CT brain neg for acute findings (unchanged from previous on 5/28)  EKG showed sinus bradycardia at 59bpm with RBBB, 1st degress AV block. Also noted t wave inversion in leads V3, V5, V6 which appeared to be new compared to 5/28.   Received plavix 1x and NS bolus x1 in ED (01 Jun 2019 16:10)    On further questioning, it appears that the patient has  symptoms of lightheadedness, nausea, and slurred speech after self administering an enema each time the episode occurs.      PAST MEDICAL & SURGICAL HISTORY:  Chronic GERD  Spinal stenosis  Urinary retention with incomplete bladder emptying  Enlarged prostate  UTI (urinary tract infection)  TIA (transient ischemic attack)  Asthma  RBBB (right bundle branch block)  Cancer of rectum  Hypertension  S/P TURP  Rectal tumor      SOCIAL HISTORY: no tob/etoh    FAMILY HISTORY: no CAD      MEDICATIONS  (STANDING):  aspirin enteric coated 81 milliGRAM(s) Oral daily  atorvastatin 80 milliGRAM(s) Oral at bedtime  cefpodoxime 200 milliGRAM(s) Oral every 12 hours  clidinium/chlordiazePOXIDE 1 Capsule(s) Oral two times a day  clopidogrel Tablet 75 milliGRAM(s) Oral daily  dextrose 5%. 1000 milliLiter(s) (50 mL/Hr) IV Continuous <Continuous>  dextrose 50% Injectable 12.5 Gram(s) IV Push once  dextrose 50% Injectable 25 Gram(s) IV Push once  dextrose 50% Injectable 25 Gram(s) IV Push once  doxazosin 4 milliGRAM(s) Oral at bedtime  heparin  Injectable 5000 Unit(s) SubCutaneous every 12 hours  insulin lispro (HumaLOG) corrective regimen sliding scale   SubCutaneous three times a day before meals  insulin lispro (HumaLOG) corrective regimen sliding scale   SubCutaneous at bedtime  mometasone 100 MICROgram(s)/formoterol 5 MICROgram(s) Inhaler 2 Puff(s) Inhalation two times a day  multivitamin 1 Tablet(s) Oral daily  pantoprazole    Tablet 40 milliGRAM(s) Oral before breakfast  sodium chloride 0.9%. 1000 milliLiter(s) (75 mL/Hr) IV Continuous <Continuous>  sulfaSALAzine 500 milliGRAM(s) Oral daily    MEDICATIONS  (PRN):  dextrose 40% Gel 15 Gram(s) Oral once PRN Blood Glucose LESS THAN 70 milliGRAM(s)/deciliter  glucagon  Injectable 1 milliGRAM(s) IntraMuscular once PRN Glucose LESS THAN 70 milligrams/deciliter      Allergies    No Known Allergies    Intolerances        REVIEW OF SYSTEMS:    CONSTITUTIONAL: No weakness, fevers or chills  EYES: No visual changes, No diplopia  ENMT: No throat pain , No exudate  NECK: No pain or stiffness  RESPIRATORY: No cough, wheezing, hemoptysis; No shortness of breath  CARDIOVASCULAR: No chest pain or palpitations  GASTROINTESTINAL: No abdominal pain. No nausea, vomiting, or hematemesis  GENITOURINARY: No dysuria, frequency or hematuria  NEUROLOGICAL: per neuro  SKIN: No itching or rash  All other review of systems is negative unless indicated above    VITAL SIGNS:   Vital Signs Last 24 Hrs  T(C): 36.8 (02 Jun 2019 08:39), Max: 36.8 (02 Jun 2019 08:39)  T(F): 98.2 (02 Jun 2019 08:39), Max: 98.2 (02 Jun 2019 08:39)  HR: 88 (02 Jun 2019 08:39) (66 - 88)  BP: 157/93 (02 Jun 2019 08:39) (129/75 - 157/93)  BP(mean): --  RR: 18 (02 Jun 2019 08:39) (16 - 18)  SpO2: 96% (02 Jun 2019 08:39) (94% - 99%)    I&O's Summary    01 Jun 2019 07:01  -  02 Jun 2019 07:00  --------------------------------------------------------  IN: 150 mL / OUT: 600 mL / NET: -450 mL        PHYSICAL EXAM:    Constitutional: NAD, awake and alert, well-developed  Eyes:  Pupils round, no lesions  ENMT: no exudate or erythema  Pulmonary: Non-labored, breath sounds are clear bilaterally, No wheezing, rales or rhonchi  Cardiovascular: PMI not palpable Regular S1 and S2, no murmurs, rubs, gallops or clicks  Gastrointestinal: Bowel Sounds present, soft, nontender.   Lymph: No peripheral edema. No cervical lymphadenopathy.  Neurological: Alert, no focal deficits  Skin: No rashes. Changes of chronic venous stasis. No cyanosis.  Psych:  Mood & affect appropriate    LABS: All Labs Reviewed:                        14.7   5.55  )-----------( 139      ( 02 Jun 2019 06:38 )             45.0                         15.3   8.06  )-----------( 164      ( 01 Jun 2019 11:45 )             46.7     02 Jun 2019 06:38    141    |  104    |  19     ----------------------------<  91     3.1     |  28     |  1.20   01 Jun 2019 11:45    139    |  100    |  22     ----------------------------<  145    3.5     |  32     |  1.60     Ca    9.4        02 Jun 2019 06:38  Ca    9.7        01 Jun 2019 11:45  Mg     2.1       02 Jun 2019 06:38    TPro  6.7    /  Alb  3.2    /  TBili  0.6    /  DBili  x      /  AST  30     /  ALT  26     /  AlkPhos  64     02 Jun 2019 06:38  TPro  7.0    /  Alb  3.5    /  TBili  0.6    /  DBili  x      /  AST  25     /  ALT  24     /  AlkPhos  68     01 Jun 2019 11:45    PT/INR - ( 01 Jun 2019 18:23 )   PT: 14.1 sec;   INR: 1.24 ratio         PTT - ( 01 Jun 2019 11:45 )  PTT:28.3 sec  CARDIAC MARKERS ( 02 Jun 2019 00:17 )  <.015 ng/mL / x     / 185 U/L / x     / 2.7 ng/mL  CARDIAC MARKERS ( 01 Jun 2019 18:23 )  <.015 ng/mL / x     / 202 U/L / x     / 2.9 ng/mL          06-02 @ 06:38  TSH: 0.32      < from: 12 Lead ECG (06.02.19 @ 09:31) >    Ventricular Rate 87 BPM    Atrial Rate 87 BPM    P-R Interval 256 ms    QRS Duration 146 ms    Q-T Interval 398 ms    QTC Calculation(Bezet) 478 ms    P Axis 62 degrees    R Axis -56 degrees    T Axis -4 degrees    Diagnosis Line Sinus rhythm with 1st degree AV block with occasional premature ventricular complexes  Right bundle branch block  Left anterior fascicular block  *** Bifascicular block ***  Inferior infarct (cited on or before 01-JUN-2019)  Abnormal ECG  When compared with ECG of 01-JUN-2019 12:39, (Unconfirmed)  Significant changes have occurred  Confirmed by Harish MARY, Darwin ([id number]) on 6/2/2019 11:09:35 AM    < end of copied text >
Chief Complaint:  Patient is a 85y old  Male who presents with a chief complaint of right sided weakness and slurred speech (2019 15:57)    Chronic GERD  Spinal stenosis  Urinary retention with incomplete bladder emptying  Enlarged prostate  UTI (urinary tract infection)  TIA (transient ischemic attack)  Asthma  RBBB (right bundle branch block)  Asthenia  Cancer of rectum  Hypertension  S/P TURP  Rectal tumor     HPI:  Patient is a 84 yo male with pmhx of rectal cancer, RBBB, urinary retention requiring self cath, DM 2 on insulin, chronic UTI, HTN, asthma, IBS, GERD, spinal stenosis, recent frequent TIA symptoms presenting to ED with symptoms of R sided weakness, slurred speech, right facial droop. Patient was recently seen at [facility] ED on 19 for symptoms of slurred speech and right leg weakness, family requested transfer to [place] and was admitted there for evaluation of TIA from -. Per daughter at bedside, patient was discharged from [place] yesterday with lipitor and plavix. This AM, home aide noted patient suddenly slumped while sitting after his shower, unable to answer questions and unable to move his right side. Patient's symptom started to resolve while in the ambulance, able to communicate with family. After arrival in ED, patient able to move all 4 extremities and communicate with mild residual slurred speech and mild right sided facial droop. Per family at bedside, episode lasted about 1 hour prior to improvement of symptoms. Daughter at bedside reported patient had about 5 episodes of TIA like symptoms over past 6 weeks. Denied having any previous episodes in the past. Patient denied any headache, visual changes, LOC, dizziness, chest pain, palpitations, TAYLOR, SOB, abdominal pain, nausea, vomiting, changes in BM or urinary symptoms, recent illness, recent fevers.     In the ED, patient VSS, afebrile  no leukocytosis, H/H wnl, plt wnl   Na 139, K 3.5, BUN 22, Cr 1.6, glucose 145, GFR 39  UA neg  CT brain neg for acute findings (unchanged from previous on )  EKG showed sinus bradycardia at 59bpm with RBBB, 1st degress AV block. Also noted t wave inversion in leads V3, V5, V6 which appeared to be new compared to .   Received plavix 1x and NS bolus x1 in ED (2019 16:10)      No Known Allergies      aspirin enteric coated 81 milliGRAM(s) Oral daily  atorvastatin 80 milliGRAM(s) Oral at bedtime  cefpodoxime 200 milliGRAM(s) Oral every 12 hours  clidinium/chlordiazePOXIDE 1 Capsule(s) Oral two times a day  clopidogrel Tablet 75 milliGRAM(s) Oral daily  dextrose 40% Gel 15 Gram(s) Oral once PRN  dextrose 5%. 1000 milliLiter(s) IV Continuous <Continuous>  dextrose 50% Injectable 12.5 Gram(s) IV Push once  dextrose 50% Injectable 25 Gram(s) IV Push once  dextrose 50% Injectable 25 Gram(s) IV Push once  doxazosin 4 milliGRAM(s) Oral at bedtime  glucagon  Injectable 1 milliGRAM(s) IntraMuscular once PRN  heparin  Injectable 5000 Unit(s) SubCutaneous every 12 hours  insulin lispro (HumaLOG) corrective regimen sliding scale   SubCutaneous three times a day before meals  insulin lispro (HumaLOG) corrective regimen sliding scale   SubCutaneous at bedtime  mometasone 100 MICROgram(s)/formoterol 5 MICROgram(s) Inhaler 2 Puff(s) Inhalation two times a day  multivitamin 1 Tablet(s) Oral daily  pantoprazole    Tablet 40 milliGRAM(s) Oral before breakfast  sodium chloride 0.9%. 1000 milliLiter(s) IV Continuous <Continuous>  sulfaSALAzine 500 milliGRAM(s) Oral daily        FAMILY HISTORY:        Review of Systems:    General:  No wt loss, fevers, chills, night sweats,fatigue,   Eyes:  Good vision, no reported pain  ENT:  No sore throat, pain, runny nose, dysphagia  CV:  No pain, palpitatioins, hypo/hypertension  Resp:  No dyspnea, cough, tachypnea, wheezing  :  No pain, bleeding, incontinence, nocturia  Muscle:  No pain, weakness  Neuro:  No weakness, tingling, memory problems  Psych:  No fatigue, insomnia, mood problems, depression  Endocrine:  No polyuria, polydypsia, cold/heat intolerance  Heme:  No petechiae, ecchymosis, easy bruisability  Skin:  No rash, tattoos, scars, edema    Relevant Family History:       Relevant Social History:       Physical Exam:    Vital Signs:  Vital Signs Last 24 Hrs  T(C): 37.2 (2019 12:20), Max: 37.2 (2019 12:20)  T(F): 99 (2019 12:20), Max: 99 (2019 12:20)  HR: 98 (2019 12:20) (71 - 98)  BP: 184/119 (2019 12:20) (151/85 - 192/104)  BP(mean): --  RR: 18 (2019 12:20) (18 - 18)  SpO2: 98% (2019 12:20) (95% - 98%)  Daily     Daily Weight in k.3 (2019 04:26)    General:  Appears stated age, well-groomed, well-nourished, no distress  HEENT:  NC/AT,  conjunctivae clear and pink, no thyromegaly, nodules, adenopathy, no JVD  Chest:  Full & symmetric excursion, no increased effort, breath sounds clear  Cardiovascular:  Regular rhythm, S1, S2, no murmur/rub/S3/S4, no abdominal bruit, no edema  Abdomen:  Soft, non-tender, non-distended, normoactive bowel sounds,  no masses ,no hepatosplenomeagaly, no signs of chronic liver disease  Extremities:  no cyanosis,clubbing or edema  Skin:  No rash/erythema/ecchymoses/petechiae/wounds/abscess/warm/dry  Neuro/Psych:  Alert, oriented, no asterixis, no tremor, no encephalopathy    Laboratory:                            16.0   5.37  )-----------( 159      ( 2019 06:14 )             49.8     06-03    142  |  103  |  13  ----------------------------<  123<H>  3.4<L>   |  33<H>  |  1.20    Ca    9.7      2019 06:14  Phos  3.0     06-03  Mg     2.3     06-03    TPro  6.7  /  Alb  3.2<L>  /  TBili  0.6  /  DBili  x   /  AST  30  /  ALT  26  /  AlkPhos  64  06-02    LIVER FUNCTIONS - ( 2019 06:38 )  Alb: 3.2 g/dL / Pro: 6.7 g/dL / ALK PHOS: 64 U/L / ALT: 26 U/L / AST: 30 U/L / GGT: x                 Imaging:
Clinical impression is most likely cerebral hypoperfusion, questionable etiology mimicking TIA like symptoms r  I would recommend telemetry evaluation to rule out underlying arrhythmia.  Echocardiogram.  The patient has been started on aspirin/plavix  statin.  check orthostatic   spoke to family

## 2019-06-03 NOTE — DISCHARGE NOTE NURSING/CASE MANAGEMENT/SOCIAL WORK - NSDCDPATPORTLINK_GEN_ALL_CORE
You can access the [facility], offered by [facility], by registering with the following website: [email or web address]

## 2019-06-03 NOTE — SWALLOW BEDSIDE ASSESSMENT ADULT - SWALLOW EVAL: RECOMMENDED FEEDING/EATING TECHNIQUES
allow for swallow between intakes/alternate food with liquid/crush medication (when feasible)/maintain upright posture during/after eating for 30 mins/oral hygiene/position upright (90 degrees)/hard swallow w/ each bite or sip/small sips/bites

## 2019-06-03 NOTE — PROGRESS NOTE ADULT - SUBJECTIVE AND OBJECTIVE BOX
Neurology follow up note    MILTON WALLER85yMale      Interval History:    Patient feels ok no new complaints seen with daughter this morning no events     MEDICATIONS    aspirin enteric coated 81 milliGRAM(s) Oral daily  atorvastatin 80 milliGRAM(s) Oral at bedtime  cefpodoxime 200 milliGRAM(s) Oral every 12 hours  clidinium/chlordiazePOXIDE 1 Capsule(s) Oral two times a day  clopidogrel Tablet 75 milliGRAM(s) Oral daily  dextrose 40% Gel 15 Gram(s) Oral once PRN  dextrose 5%. 1000 milliLiter(s) IV Continuous <Continuous>  dextrose 50% Injectable 12.5 Gram(s) IV Push once  dextrose 50% Injectable 25 Gram(s) IV Push once  dextrose 50% Injectable 25 Gram(s) IV Push once  doxazosin 4 milliGRAM(s) Oral at bedtime  enalapril 10 milliGRAM(s) Oral once  glucagon  Injectable 1 milliGRAM(s) IntraMuscular once PRN  heparin  Injectable 5000 Unit(s) SubCutaneous every 12 hours  insulin lispro (HumaLOG) corrective regimen sliding scale   SubCutaneous three times a day before meals  insulin lispro (HumaLOG) corrective regimen sliding scale   SubCutaneous at bedtime  mometasone 100 MICROgram(s)/formoterol 5 MICROgram(s) Inhaler 2 Puff(s) Inhalation two times a day  multivitamin 1 Tablet(s) Oral daily  pantoprazole    Tablet 40 milliGRAM(s) Oral before breakfast  sodium chloride 0.9%. 1000 milliLiter(s) IV Continuous <Continuous>  sulfaSALAzine 500 milliGRAM(s) Oral daily      Allergies    No Known Allergies    Intolerances            Vital Signs Last 24 Hrs  T(C): 37.2 (2019 12:20), Max: 37.2 (2019 12:20)  T(F): 99 (2019 12:20), Max: 99 (2019 12:20)  HR: 98 (2019 12:20) (71 - 98)  BP: 184/119 (2019 12:20) (150/86 - 192/104)  BP(mean): --  RR: 18 (2019 12:20) (18 - 18)  SpO2: 98% (2019 12:20) (94% - 98%)          REVIEW OF SYSTEMS:   feel ok normal self     On Neurological Examination:    The patient is awake, alert.    Extraocular movements are intact.    Pupils are equal, round, and reactive bilaterally, 3 mm to 2 mm.    The patient is hard of hearing.    Speech was fluent.    Smile symmetric.   Motor:  Bilateral upper and lower 4+/5.    Sensory:  Bilateral upper and lower were intact to light touch.    No drift.  Finger-to-nose within normal limits.      Follow simple commands  Follow complex commands    GENERAL Exam: Nontoxic , No Acute Distress   	  HEENT:  normocephalic, atraumatic  		  LUNGS: Clear bilaterally   	  HEART: Normal S1S2   No murmur RRR        	  GI/ ABDOMEN:  Soft  Non tender    EXTREMITIES:   No Edema  No Clubbing  No Cyanosis   	   SKIN: Normal  No Ecchymosis             LABS:  CBC Full  -  ( 2019 06:14 )  WBC Count : 5.37 K/uL  RBC Count : 5.79 M/uL  Hemoglobin : 16.0 g/dL  Hematocrit : 49.8 %  Platelet Count - Automated : 159 K/uL  Mean Cell Volume : 86.0 fl  Mean Cell Hemoglobin : 27.6 pg  Mean Cell Hemoglobin Concentration : 32.1 gm/dL  Auto Neutrophil # : x  Auto Lymphocyte # : x  Auto Monocyte # : x  Auto Eosinophil # : x  Auto Basophil # : x  Auto Neutrophil % : x  Auto Lymphocyte % : x  Auto Monocyte % : x  Auto Eosinophil % : x  Auto Basophil % : x    Urinalysis Basic - ( 2019 15:11 )    Color: Yellow / Appearance: Clear / S.015 / pH: x  Gluc: x / Ketone: Small  / Bili: Negative / Urobili: Negative   Blood: x / Protein: 150 mg/dL / Nitrite: Negative   Leuk Esterase: Trace / RBC: 0-2 /HPF / WBC 0-2   Sq Epi: x / Non Sq Epi: Occasional / Bacteria: Occasional      -    142  |  103  |  13  ----------------------------<  123<H>  3.4<L>   |  33<H>  |  1.20    Ca    9.7      2019 06:14  Phos  3.0     06-03  Mg     2.3     06-03    TPro  6.7  /  Alb  3.2<L>  /  TBili  0.6  /  DBili  x   /  AST  30  /  ALT  26  /  AlkPhos  64  06-02    Hemoglobin A1C:     LIVER FUNCTIONS - ( 2019 06:38 )  Alb: 3.2 g/dL / Pro: 6.7 g/dL / ALK PHOS: 64 U/L / ALT: 26 U/L / AST: 30 U/L / GGT: x           Vitamin B12   PT/INR - ( 2019 18:23 )   PT: 14.1 sec;   INR: 1.24 ratio               RADIOLOGY      ANALYSIS AND PLAN:  This is an 85-year-old with an episode of feeling lightheaded, blurry vision, looking pale, warm and sweaty, unresponsive, weakness, and slurred speech.  1.	Clinical impression is most likely recurrent TIA-like symptoms secondary to cerebral hypoperfusion.  It would be highly unlikely for the last few weeks the patient is throwing a clot to the same region causing these above constellation of symptoms that he has been experiencing again today patient did try to give himself enemas this morning, questionable this could be causing any type of vasovagal phenomenon.  2.	Telemetry evaluation to rule out underlying arrhythmia.  3.	For questionable TIAs, I would continue the patient on his aspirin and Plavix.  4.	For high cholesterol, continue the patient on statin.  5.	For history of diabetes, strict control of blood sugars.  6.	CTA of the head and neck done at the other institution which showed no stenosis   7.	h/o CVA home medications   8.	I spoke with family members in great detail.  Primary  will be daughter, Lilibeth.  Telephone number [phone]. 6/3/19 seen with daughter   9.	 EEG normal  10.	Possible recrudesce from cerebral hypoperfusion at baseline exam    11.	Greater than 40 minutes of the time was spent with the patient, plan of care, reviewing data, speaking to family, multidisciplinary healthcare team.  12.	no new events today   13.	keep SBP around 140 if possible     Thank you for the courtesy of consultation.    Physical therapy evaluation as tolerated  OOB to chair/ambulation with assistance only if possible.

## 2019-06-03 NOTE — DISCHARGE NOTE PROVIDER - HOSPITAL COURSE
FROM ADMISSION H+P:     HPI:    Patient is a 84 yo male with pmhx of rectal cancer, RBBB, urinary retention requiring self cath, DM 2 on insulin, chronic UTI, HTN, asthma, IBS, GERD, spinal stenosis, recent frequent TIA symptoms presenting to ED with symptoms of R sided weakness, slurred speech, right facial droop. Patient was recently seen at [facility] on 5/29/19 for symptoms of slurred speech and right leg weakness, family requested transfer to [place] and was admitted there for evaluation of TIA from 5/29-5/31. Per daughter at bedside, patient was discharged from [place] yesterday with lipitor and plavix. This AM, home aide noted patient suddenly slumped while sitting after his shower, unable to answer questions and unable to move his right side. Patient's symptom started to resolve while in the ambulance, able to communicate with family. After arrival in ED, patient able to move all 4 extremities and communicate with mild residual slurred speech and mild right sided facial droop. Per family at bedside, episode lasted about 1 hour prior to improvement of symptoms. Daughter at bedside reported patient had about 5 episodes of TIA like symptoms over past 6 weeks. Denied having any previous episodes in the past. Patient denied any headache, visual changes, LOC, dizziness, chest pain, palpitations, TAYLOR, SOB, abdominal pain, nausea, vomiting, changes in BM or urinary symptoms, recent illness, recent fevers.         In the ED, patient VSS, afebrile    no leukocytosis, H/H wnl, plt wnl     Na 139, K 3.5, BUN 22, Cr 1.6, glucose 145, GFR 39    UA neg    CT brain neg for acute findings (unchanged from previous on 5/28)    EKG showed sinus bradycardia at 59bpm with RBBB, 1st degress AV block. Also noted t wave inversion in leads V3, V5, V6 which appeared to be new compared to 5/28.     Received plavix 1x and NS bolus x1 in ED (01 Jun 2019 16:10)            ---    HOSPITAL COURSE: Patient admitted for TIA and new EKG changes. Home medication, nadolol enalapril and lasix were held to allow for permissive hypertension. Neurology, Dr. Carrington, evaluated patient symptoms most likely cerebral hypoperfusion, etiology mimicking TIA. Cardiology, Dr. Moreira, evaluated patient likely vasovagal component as patient is straining during BM after giving himself an enema when symptoms occur. Patient was continued on ASA/Plavix/Statin.         Physical Therapy recommended home with home assist as patient has 24 hour HHA.        ---    CONSULTANTS:     Neuro: Dr. Dangelo    Cardio: Dr. Moreira         ---    TIME SPENT:    The total amount of time spent reviewing the hospital notes, laboratory values, imaging findings, assessing/counseling the patient, discussing with consultant physicians, social work, nursing staff took -- minutes        ---    Primary care provider was made aware of plan for discharge:      [  ] NO     [  ] YES FROM ADMISSION H+P:     HPI:    Patient is a 86 yo male with pmhx of rectal cancer, RBBB, urinary retention requiring self cath, DM 2 on insulin, chronic UTI, HTN, asthma, IBS, GERD, spinal stenosis, recent frequent TIA symptoms presenting to ED with symptoms of R sided weakness, slurred speech, right facial droop. Patient was recently seen at [facility] on 5/29/19 for symptoms of slurred speech and right leg weakness, family requested transfer to [place] and was admitted there for evaluation of TIA from 5/29-5/31. Per daughter at bedside, patient was discharged from [place] yesterday with lipitor and plavix. This AM, home aide noted patient suddenly slumped while sitting after his shower, unable to answer questions and unable to move his right side. Patient's symptom started to resolve while in the ambulance, able to communicate with family. After arrival in ED, patient able to move all 4 extremities and communicate with mild residual slurred speech and mild right sided facial droop. Per family at bedside, episode lasted about 1 hour prior to improvement of symptoms. Daughter at bedside reported patient had about 5 episodes of TIA like symptoms over past 6 weeks. Denied having any previous episodes in the past. Patient denied any headache, visual changes, LOC, dizziness, chest pain, palpitations, TAYLOR, SOB, abdominal pain, nausea, vomiting, changes in BM or urinary symptoms, recent illness, recent fevers.         In the ED, patient VSS, afebrile    no leukocytosis, H/H wnl, plt wnl     Na 139, K 3.5, BUN 22, Cr 1.6, glucose 145, GFR 39    UA neg    CT brain neg for acute findings (unchanged from previous on 5/28)    EKG showed sinus bradycardia at 59bpm with RBBB, 1st degress AV block. Also noted t wave inversion in leads V3, V5, V6 which appeared to be new compared to 5/28.     Received plavix 1x and NS bolus x1 in ED (01 Jun 2019 16:10)            ---    HOSPITAL COURSE: Patient admitted for suspicion of TIA. Home medication, nadolol enalapril and lasix were held to allow for permissive hypertension. Neurology, Dr. Carrington, evaluated patient symptoms most likely cerebral hypoperfusion in area of recent stroke mimicking TIA, in setting of drop in BP from vasovagal response while straining to defecate. Cardiology, Dr. Moreira, evaluated patient likely vasovagal component as patient is straining during BM after giving himself an enema when symptoms occur. Patient was continued on ASA/Plavix/Statin. GI (Stephanie) consulted if alternative possible to the daily enemas, and GI recommended to continue his enema as the rectal cancer surgeries likely made it difficult to defecate. GI regimen started to help with expelling the stool.         Physical Therapy recommended home with home assist as patient has 24 hour HHA.            ---    CONSULTANTS:     Neuro: Dr. Carrington    Cardio: Dr. Moreira     GI: Dr. Brown        ---    TIME SPENT:    45min

## 2019-06-03 NOTE — PROGRESS NOTE ADULT - PROBLEM SELECTOR PLAN 5
- on dulera at home - therapeutic exchange with symbicort
- on dulera at home - therapeutic exchange with symbicort

## 2019-06-03 NOTE — DISCHARGE NOTE PROVIDER - NSDCQMMRS_NEU_ALL_CORE
1 - No significant disability. Able to carry out all usual activities, despite some symptoms 0 - No symptoms

## 2019-06-03 NOTE — PROGRESS NOTE ADULT - PROBLEM SELECTOR PLAN 4
- on home med nadolol, enalapril, lasix   - will hold BP meds for now to allow for permissive htn   - patient reported lasix is for LE edema, not for CHF
- on home med nadolol, enalapril, lasix   - patient reported lasix is for LE edema, not for CHF  -Resumed home enalapril

## 2019-06-03 NOTE — CONSULT NOTE ADULT - REASON FOR ADMISSION
right sided weakness and slurred speech

## 2019-06-03 NOTE — SWALLOW BEDSIDE ASSESSMENT ADULT - ASR SWALLOW ASPIRATION MONITOR
oral hygiene/position upright (90Y)/pneumonia/throat clearing/upper respiratory infection/change of breathing pattern/cough/gurgly voice/fever

## 2019-06-03 NOTE — DISCHARGE NOTE PROVIDER - CARE PROVIDER_API CALL
Jerrell Gaytan  Cardiologist  [address] [address], [place] [address]  Phone: [phone]  Fax: (   )    -  Follow Up Time:     Abdelrahman Cervantes/PCP  [address] [place] [address]  Phone: [phone]  Fax: (   )    -  Follow Up Time:

## 2019-06-03 NOTE — PROGRESS NOTE ADULT - PROBLEM SELECTOR PLAN 1
- recent episodes of frequent TIAs -  was admitted to [place] on 5/29-5/31 for TIA workup, reported MRI performed there and was told it showed multiple mini-strokes.   - Reported appx 5 episodes of TIA symptoms over past few weeks, lasting usually 15 minutes.   - of note, patient was on testosterone therapy at home - patient and family counseled on stopping testosterone at this time to avoid increasing risks of further CVA and ACS events   - was discharged from [place] with atorvastatin and plavix  - currently symptomatically improved on evaluation in ED, CT head neg for acute findings  - continue atorvastatin and plavix   - neurology consult Dr. Carirngton, recs appreciated  - discussed with neuro, who feels that pt's recurrence of symptoms may be due to vasovagal events while he has enemas/straining to defecate that result in relative decrease in BP and cause hypoperfusion to area of the brain already compromised by the recent small strokes seen in [facility].   - f/u TTE  - no need for carotid Doppler as pt has normal CTA of carotids in [facility] recently  - f/u Hgb A1c, thyroid panel, lipid panel  - b/l LE doppler negative  - admit to telemetry to monitor for any underlying undiagnosed arrhythmias   - BP currently stable, will hold bp meds to allow for permissive HTN pending neuro rec for now   - fall precaution  - PT eval  - speech and swallow eval -- passed bedside dysphagia screen
- recent episodes of frequent TIAs -  was admitted to [place] on 5/29-5/31 for TIA workup, reported MRI performed there and was told it showed multiple mini-strokes.   - Reported appx 5 episodes of TIA symptoms over past few weeks, lasting usually 15 minutes.   - of note, patient was on testosterone therapy at home - patient and family counseled on stopping testosterone at this time to avoid increasing risks of further CVA and ACS events   - was discharged from [place] with atorvastatin and plavix, will continue  - currently symptomatically improved on evaluation in ED, CT head neg for acute findings  - continue atorvastatin and plavix   - neurology consult Dr. Carrington, recs appreciated  - discussed with neuro, who feels that pt's recurrence of symptoms may be due to vasovagal events while he has enemas/straining to defecate that result in relative decrease in BP and cause hypoperfusion to area of the brain already compromised by the recent small strokes seen in [facility].   -TTE showing LVEF 55% with trace MR and TR  - no need for carotid Doppler as pt has normal CTA of carotids in [facility] recently  - f/u Hgb A1c, thyroid panel, lipid panel - Lipid panel with elevated trigs, T4 WNL, HbA1c 6.7  - b/l LE doppler negative  - admit to telemetry to monitor for any underlying undiagnosed arrhythmias   - No longer allowing for permissive HTN, resumed home enalapril.   -PT home with assistance

## 2019-06-03 NOTE — DISCHARGE NOTE PROVIDER - PROVIDER TOKENS
FREE:[LAST:[Lukas],FIRST:[Jerrell],PHONE:[phone]],FAX:[(   )    -],ADDRESS:[Cardiologist  [address] [facility] [address], [place] [address]]],FREE:[LAST:[Billy],FIRST:[Abdelrahman],PHONE:[phone]],FAX:[(   )    -],ADDRESS:[GI/PCP  [address] [place] [address]]]

## 2019-06-03 NOTE — DISCHARGE NOTE NURSING/CASE MANAGEMENT/SOCIAL WORK - NSDCPEPTSTRK_GEN_ALL_CORE
Stroke warning signs and symptoms/Signs and symptoms of stroke/Call 911 for stroke/Need for follow up after discharge/Prescribed medications/Risk factors for stroke/Stroke education booklet/Stroke support groups for patients, families, and friends

## 2019-06-03 NOTE — PROGRESS NOTE ADULT - PROBLEM SELECTOR PLAN 6
- continue terazosin - therapeutic exchange with doxazosin   - straight cath at home TID - will continue
- continue terazosin - therapeutic exchange with doxazosin   - straight cath at home TID - will continue

## 2019-06-03 NOTE — PROGRESS NOTE ADULT - ATTENDING COMMENTS
I personally saw and examined the patient in detail.  I have spoken to the above provider regarding the assessment and plan of care.  I reviewed the above assessment and plan of care, and agree.  I have made changes in the body of the note where appropriate.
Note written by attending, see above.  Time spent: 40min. More than 50% of the visit was spent counseling the patient on his condition - syncope, vasovagal, hx of CVA.

## 2019-06-03 NOTE — PROGRESS NOTE ADULT - PROBLEM SELECTOR PLAN 7
- had rectal ca decades ago and multiple surgeries since when he uses enema daily for BM, but this may be contributing to neurologic symptoms with possibly inducing vasovagal reaction -- discuss possible alternatives  - continue home med sulfasalazine   - on home med Librax - will need to bring from home  - also on home med opium tincture to control GI motility - will hold while in hospital
- had rectal ca decades ago and multiple surgeries since when he uses enema daily for BM, but this may be contributing to neurologic symptoms with possibly inducing vasovagal reaction -- discuss possible alternatives  -GI initially planned for CT abdomen with PO and IV contrast for better eval of chronic constipation, however patient refused. Will have outpatient follow up with GI regarding constipation.   - continue home med sulfasalazine   - on home med Librax - will need to bring from home  - also on home med opium tincture to control GI motility - will hold while in hospital

## 2019-06-03 NOTE — DISCHARGE NOTE PROVIDER - NSDCCPCAREPLAN_GEN_ALL_CORE_FT
PRINCIPAL DISCHARGE DIAGNOSIS  Diagnosis: TIA (transient ischemic attack)  Assessment and Plan of Treatment: Your symptoms are likely worsened due to hypoperfusion from straining when having a bowel movement. Continue your aspirin and plavix. Follow up with your neurologist in one week. Follow up with your primary care doctor in one week.      SECONDARY DISCHARGE DIAGNOSES  Diagnosis: Hypertension  Assessment and Plan of Treatment:     Diagnosis: Diabetes mellitus  Assessment and Plan of Treatment: Continue home medication: Lantus    Diagnosis: Asthma  Assessment and Plan of Treatment: Continue home  Dulera    Diagnosis: Urinary retention with incomplete bladder emptying  Assessment and Plan of Treatment: Continue doxazosin and self catheterization as recommended by your urologist.    Diagnosis: Cancer of rectum  Assessment and Plan of Treatment: Continue enema's as recommended by your GI doctor. Continue home medications: sulfasalazine, Librax, opium tincture PRINCIPAL DISCHARGE DIAGNOSIS  Diagnosis: TIA (transient ischemic attack)  Assessment and Plan of Treatment: It is not likely that you had a new TIA or stroke. Your symptoms might be mimicking your stroke due to a vasovagal reaction leading to decrease in perfusion to the areas of the brain from your recent stroke when you are straining while having a bowel movement. Continue your aspirin, plavix and lipitor. Follow up with your neurologist within one week. Use the stool softeners to hopefully decrease straining. Always have your aide with you when you will be having enemas / bowel movements as you may be at risk of fainting. Follow up with your gastroenterologist to help you with a regimen that would decrease need for straining during defecation.      SECONDARY DISCHARGE DIAGNOSES  Diagnosis: Urinary retention with incomplete bladder emptying  Assessment and Plan of Treatment: Continue doxazosin and self catheterization as recommended by your urologist.    Diagnosis: Asthma  Assessment and Plan of Treatment: Continue home  Dulera    Diagnosis: Diabetes mellitus  Assessment and Plan of Treatment: Continue home medication: Lantus and follow up with your endocrinologist.    Diagnosis: Hypertension  Assessment and Plan of Treatment: Continue your home regimen. Monitor your blood pressure with goal for the top number to be ~140s. PRINCIPAL DISCHARGE DIAGNOSIS  Diagnosis: TIA (transient ischemic attack)  Assessment and Plan of Treatment: It is not likely that you had a new TIA or stroke. Your symptoms might be mimicking your stroke due to a vasovagal reaction leading to decrease in perfusion to the areas of the brain from your recent stroke when you are straining while having a bowel movement. Continue your aspirin, plavix and lipitor. Follow up with your neurologist within one week. Use the stool softeners / bowel regimen (colace and Miralax) to hopefully decrease straining. Always have your aide with you when you will be having enemas / bowel movements as you may be at risk of fainting. Follow up with your gastroenterologist to help you with a regimen that would decrease need for straining during defecation.      SECONDARY DISCHARGE DIAGNOSES  Diagnosis: Urinary retention with incomplete bladder emptying  Assessment and Plan of Treatment: Continue doxazosin and self catheterization as recommended by your urologist.    Diagnosis: Asthma  Assessment and Plan of Treatment: Continue home  Dulera    Diagnosis: Diabetes mellitus  Assessment and Plan of Treatment: Continue home medication: Lantus and follow up with your endocrinologist.    Diagnosis: Hypertension  Assessment and Plan of Treatment: Continue your home regimen. Monitor your blood pressure with goal for the top number to be ~140s.

## 2019-06-03 NOTE — PROGRESS NOTE ADULT - PROBLEM SELECTOR PLAN 3
- on home lantus - will hold for now since patient's glucose appeared to be well controlled  - HERLINDA, routine accuchecks, hypoglycemic protocol, consistent carb diet
- on home lantus - will hold for now since patient's glucose appeared to be well controlled  - HERLINDA, routine accuchecks, hypoglycemic protocol, consistent carb diet

## 2019-06-03 NOTE — PROGRESS NOTE ADULT - REASON FOR ADMISSION
right sided weakness and slurred speech

## 2019-06-03 NOTE — SWALLOW BEDSIDE ASSESSMENT ADULT - ADDITIONAL RECOMMENDATIONS
Skilled ST services are not warranted at this time. Re-consult this department should any new concerns arise regarding the patient's speech/language/swallowing abilities at this time.

## 2019-06-03 NOTE — PROGRESS NOTE ADULT - PROBLEM SELECTOR PLAN 8
- GERD - continue omeprazole   - VTE ppx with HSQ  - PO diet  - fall precautions
- GERD - continue omeprazole   - VTE ppx with HSQ  - PO diet  - fall precautions

## 2019-06-03 NOTE — SWALLOW BEDSIDE ASSESSMENT ADULT - SWALLOW EVAL: DIAGNOSIS
1. The patient demonstrated functional oral management of puree, solid, and thin liquid textures marked by adequate bolus collection, transfer, and posterior transport. 2. The patient demonstrated a functional pharyngeal phase of the swallow for puree, solid, and thin liquid textures marked by a timely pharyngeal swallow trigger with adequate hyolaryngeal elevation upon digital palpation w/o evidence of airway penetration.

## 2019-06-03 NOTE — PROGRESS NOTE ADULT - SUBJECTIVE AND OBJECTIVE BOX
Patient is a 86 yo male with pmhx of rectal cancer, RBBB, urinary retention requiring self cath, DM 2 on insulin, chronic UTI, HTN, asthma, IBS, GERD, recent frequent TIA symptoms presenting to ED with symptoms of R sided weakness, slurred speech, right facial droop. Admit for evaluation of TIA, new EKG changes.     Interval history: Patient seen and evaluated at the bedside, seen resting in bed, daughter present at the bedside. Patient clearly anxious to go home, citing that it is his 64rd wedding anniversary and he would like to go home.  Patient reports he feels well. attempted to obtain consent for CT abdomen to evaluate for constipation, but was found to have had colonoscopy within the past year. BP trend elevated, now s/p 10mg enalapril, VSS otherwise stable.     REVIEW OF SYSTEMS:    CONSTITUTIONAL: No weakness, fevers or chills  EYES/ENT: No visual changes, no throat pain   RESPIRATORY: No cough, wheezing, hemoptysis; No shortness of breath  CARDIOVASCULAR: No chest pain or palpitations  GASTROINTESTINAL: No abdominal, nausea, vomiting, or hematemesis; No diarrhea. Admits to chronic constipation  GENITOURINARY: No dysuria, frequency or hematuria  NEUROLOGICAL: No dizziness, numbness, or weakness  SKIN: No itching, burning, rashes, or lesions     VITAL SIGNS:  Vital Signs Last 24 Hrs  T(C): 37.2 (03 Jun 2019 12:20), Max: 37.2 (03 Jun 2019 12:20)  T(F): 99 (03 Jun 2019 12:20), Max: 99 (03 Jun 2019 12:20)  HR: 98 (03 Jun 2019 12:20) (71 - 98)  BP: 184/119 (03 Jun 2019 12:20) (150/86 - 192/104)  BP(mean): --  RR: 18 (03 Jun 2019 12:20) (18 - 18)  SpO2: 98% (03 Jun 2019 12:20) (95% - 98%)      PHYSICAL EXAM:     GENERAL: NAD, obese  HEENT:  anicteric, moist mucous membranes  CHEST/LUNG:  CTA b/l, no rales, wheezes, or rhonchi  HEART:  RRR, S1, S2  ABDOMEN:  soft, nontender, nondistended, bowel sounds present 4 quadrants  EXTREMITIES: no edema, cyanosis, or calf tenderness  NERVOUS SYSTEM: AA&OX3, 5/5 str in all 4 extremities, sensation to light touch intact, speech fluent, cranial nerves II-XII intact                          16.0   5.37  )-----------( 159      ( 03 Jun 2019 06:14 )             49.8     06-03    142  |  103  |  13  ----------------------------<  123<H>  3.4<L>   |  33<H>  |  1.20    Ca    9.7      03 Jun 2019 06:14  Phos  3.0     06-03  Mg     2.3     06-03    TPro  6.7  /  Alb  3.2<L>  /  TBili  0.6  /  DBili  x   /  AST  30  /  ALT  26  /  AlkPhos  64  06-02      CAPILLARY BLOOD GLUCOSE      POCT Blood Glucose.: 127 mg/dL (03 Jun 2019 11:52)  POCT Blood Glucose.: 113 mg/dL (03 Jun 2019 08:01)  POCT Blood Glucose.: 99 mg/dL (02 Jun 2019 21:13)  POCT Blood Glucose.: 124 mg/dL (02 Jun 2019 17:04)      MEDICATIONS  (STANDING):  aspirin enteric coated 81 milliGRAM(s) Oral daily  atorvastatin 80 milliGRAM(s) Oral at bedtime  cefpodoxime 200 milliGRAM(s) Oral every 12 hours  clidinium/chlordiazePOXIDE 1 Capsule(s) Oral two times a day  clopidogrel Tablet 75 milliGRAM(s) Oral daily  dextrose 5%. 1000 milliLiter(s) (50 mL/Hr) IV Continuous <Continuous>  dextrose 50% Injectable 12.5 Gram(s) IV Push once  dextrose 50% Injectable 25 Gram(s) IV Push once  dextrose 50% Injectable 25 Gram(s) IV Push once  doxazosin 4 milliGRAM(s) Oral at bedtime  heparin  Injectable 5000 Unit(s) SubCutaneous every 12 hours  insulin lispro (HumaLOG) corrective regimen sliding scale   SubCutaneous three times a day before meals  insulin lispro (HumaLOG) corrective regimen sliding scale   SubCutaneous at bedtime  mometasone 100 MICROgram(s)/formoterol 5 MICROgram(s) Inhaler 2 Puff(s) Inhalation two times a day  multivitamin 1 Tablet(s) Oral daily  pantoprazole    Tablet 40 milliGRAM(s) Oral before breakfast  sodium chloride 0.9%. 1000 milliLiter(s) (75 mL/Hr) IV Continuous <Continuous>  sulfaSALAzine 500 milliGRAM(s) Oral daily

## 2019-06-03 NOTE — SWALLOW BEDSIDE ASSESSMENT ADULT - COMMENTS
The patient was seen this AM for an initial assessment of swallow function, at which time he was alert and cooperative. Patient's daughter present at bedside throughout this evaluation. Per charting, the patient is an "86 yo male with pmhx of rectal cancer, RBBB, urinary retention requiring self cath, DM 2 on insulin, chronic UTI, HTN, asthma, IBS, GERD, spinal stenosis, recent frequent TIA symptoms presenting to ED with symptoms of R sided weakness, slurred speech, right facial droop. Patient was recently seen at [facility] ED on 5/29/19 for symptoms of slurred speech and right leg weakness, family requested transfer to [place] and was admitted there for evaluation of TIA from 5/29-5/31. Per daughter at bedside, patient was discharged from [place] yesterday with lipitor and plavix. This AM, home aide noted patient suddenly slumped while sitting after his shower, unable to answer questions and unable to move his right side. Patient's symptom started to resolve while in the ambulance, able to communicate with family. After arrival in ED, patient able to move all 4 extremities and communicate with mild residual slurred speech and mild right sided facial droop. Per family at bedside, episode lasted about 1 hour prior to improvement of symptoms. Daughter at bedside reported patient had about 5 episodes of TIA like symptoms over past 6 weeks." Recent CT of the brain revealed, "No parenchymal contusion, hemorrhage or extra-axial collection. No CT evidence of an acute territorial infarction. Chronic small vessel ischemic changes and multiple chronic lacunar infarcts." Discussed results and recommendations from this evaluation with the patient, patient's daughter, RN, and call out to MD.

## 2019-06-03 NOTE — PROGRESS NOTE ADULT - PROBLEM SELECTOR PROBLEM 6
Urinary retention with incomplete bladder emptying
Urinary retention with incomplete bladder emptying

## 2019-06-03 NOTE — DISCHARGE NOTE PROVIDER - NSDCQMSTROKERISK_NEU_ALL_CORE
History of a stroke or TIA/Diabetes/High blood pressure High blood pressure/Diabetes/History of a stroke or TIA

## 2019-06-21 ENCOUNTER — APPOINTMENT (OUTPATIENT)
Dept: NEUROLOGY | Facility: CLINIC | Age: 85
End: 2019-06-21

## 2019-08-21 NOTE — ED ADULT NURSE NOTE - NIH STROKE SCALE: 9. BEST LANGUAGE, QM
D- Tikosyn initiation   I.A- Aflutter HR 90's/paced. VSS. RA. Denies pain and SOB. NPO p MN for cardioversion at 0800 then RHC in afternoon.   P- Continue to monitor and notify team of changes.    (0) No aphasia; normal

## 2019-09-19 PROCEDURE — 82962 GLUCOSE BLOOD TEST: CPT

## 2019-09-19 PROCEDURE — 84100 ASSAY OF PHOSPHORUS: CPT

## 2019-09-19 PROCEDURE — 97530 THERAPEUTIC ACTIVITIES: CPT

## 2019-09-19 PROCEDURE — 85610 PROTHROMBIN TIME: CPT

## 2019-09-19 PROCEDURE — 97162 PT EVAL MOD COMPLEX 30 MIN: CPT

## 2019-09-19 PROCEDURE — 80053 COMPREHEN METABOLIC PANEL: CPT

## 2019-09-19 PROCEDURE — 82550 ASSAY OF CK (CPK): CPT

## 2019-09-19 PROCEDURE — 84484 ASSAY OF TROPONIN QUANT: CPT

## 2019-09-19 PROCEDURE — 95816 EEG AWAKE AND DROWSY: CPT

## 2019-09-19 PROCEDURE — 80061 LIPID PANEL: CPT

## 2019-09-19 PROCEDURE — 97116 GAIT TRAINING THERAPY: CPT

## 2019-09-19 PROCEDURE — 84436 ASSAY OF TOTAL THYROXINE: CPT

## 2019-09-19 PROCEDURE — 85027 COMPLETE CBC AUTOMATED: CPT

## 2019-09-19 PROCEDURE — 84443 ASSAY THYROID STIM HORMONE: CPT

## 2019-09-19 PROCEDURE — 36415 COLL VENOUS BLD VENIPUNCTURE: CPT

## 2019-09-19 PROCEDURE — 85730 THROMBOPLASTIN TIME PARTIAL: CPT

## 2019-09-19 PROCEDURE — 99285 EMERGENCY DEPT VISIT HI MDM: CPT | Mod: 25

## 2019-09-19 PROCEDURE — 93005 ELECTROCARDIOGRAM TRACING: CPT

## 2019-09-19 PROCEDURE — 83735 ASSAY OF MAGNESIUM: CPT

## 2019-09-19 PROCEDURE — 84480 ASSAY TRIIODOTHYRONINE (T3): CPT

## 2019-09-19 PROCEDURE — 70450 CT HEAD/BRAIN W/O DYE: CPT

## 2019-09-19 PROCEDURE — 94640 AIRWAY INHALATION TREATMENT: CPT

## 2019-09-19 PROCEDURE — 93306 TTE W/DOPPLER COMPLETE: CPT

## 2019-09-19 PROCEDURE — 80048 BASIC METABOLIC PNL TOTAL CA: CPT

## 2019-09-19 PROCEDURE — 83036 HEMOGLOBIN GLYCOSYLATED A1C: CPT

## 2019-09-19 PROCEDURE — 82553 CREATINE MB FRACTION: CPT

## 2019-09-19 PROCEDURE — 84439 ASSAY OF FREE THYROXINE: CPT

## 2019-09-19 PROCEDURE — 87086 URINE CULTURE/COLONY COUNT: CPT

## 2019-09-19 PROCEDURE — 93970 EXTREMITY STUDY: CPT

## 2019-09-19 PROCEDURE — 81001 URINALYSIS AUTO W/SCOPE: CPT

## 2020-08-20 NOTE — PROGRESS NOTE ADULT - PROBLEM SELECTOR PLAN 2
Date Performed: 08/20/2020    HISTORY OF PRESENT ILLNESS:  A 46-year-old female coming here for a physical.  She is seeing OB/GYN for breast and Pap exam.  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  There are a lot of changes at her job, so she is extremely stressed out about that.  Otherwise, she has history of obesity and acid reflux.  Has been doing well, except she has a hard time to lose weight.  She does not have enough time to exercise and watch diet.  Sometimes she is a stress eater, but that does not help either.    REVIEW OF SYSTEMS:  As above.  CONSTITUTIONAL:  No weight changes.  No fever.  No weakness.  No fatigue.  No changes in appetite.  No night sweats.  HEENT:  No head trauma.  No pain.  No changes in vision.  No eye discharge, redness, or pain.  No nasal discharge.  No epistaxis.  No congestion.  No soreness in the mouth cavity.  No tongue soreness.  No dry mouth.  No sore throat.  No problems to speak or swallow.  CARDIOVASCULAR:  No chest pains.  No palpitations.  No shortness of breath.  No PND.  No orthopnea.  No peripheral edema.  No dizziness.  No fainting.  No claudication.  RESPIRATORY:  No shortness of breath.  No cough.  No phlegm production.  No hemoptysis.  No wheezing.  GASTROINTESTINAL:  No nausea, vomiting, diarrhea or constipation.  No hematochezia or melena.  No hematemesis.  No dysphagia.  GENITOURINARY:  No urgency or frequency on urination.  No pyuria.  No hematuria.  No incontinence.  MUSCULOSKELETAL:  No joint pains.  No swelling.  No stiffness in the joints.  No traumas to the joints.  No falls.  SKIN:  Normal integument.  No rashes.  No lesions.  No excessive skin dryness.  No color changes.  No new or changed moles.  No excessive hair loss.  No excessive hair growth.  No changes of the nails.  NEUROLOGIC:  No weakness or numbness of the extremities or any side of the body.  No 
changes in coordination.  No headaches.  No vertigo.  No seizures.  No memory loss.  No falls.  No problems with balance.  No muscular atrophy.  PSYCHIATRIC:  No mood changes.  No depression or anxiety.  No emotional lability.  No suicidal ideation.  No hallucinations.  No problems with insomnia.  ENDOCRINE:  No heat or cold intolerance.  No excessive thirst.  No excessive urination.  No changes in appetite.  No changes in skin color or hair growth.  HEMATOPOIETIC AND LYMPHATIC:  No easy bruising.  No abnormal bleeding.  No excessive fatigue.  No dyspnea.  No tender nodules on palpation or any noticeable nodules.  No focal swelling noted.  ACCIDENT HISTORY:  There are no falls.  No trauma.  No violence.  No abuse.     PHYSICAL EXAMINATION:  HEENT:  Conjunctivae not injected.  PERRLA.  EOMI.  No nystagmus.  Ears:  Normal tympanic membranes with no fluid.  No erythema of the TMs.  No sinus point tenderness on percussion.  Nasal mucosa normal.  Oral mucosa pink with no lesions.  Tongue is midline.  Uvula is midline.  No exudates or erythema over the tonsils.  NECK:  Supple, with no JVD, lymphadenopathy or thyromegaly.  Normal pulses over carotids.  No masses.  CHEST:  Clear to auscultation bilaterally.  No wheezing, rhonchi, or crackles.  Heart rate is regular with normal S1, S2.  No gallop, murmur, or rub.  No thrills.  ABDOMEN:  Soft, benign, truncal obesity.  No masses on palpation.  No hepatosplenomegaly.  No tenderness.  No guarding, rebound, or rigidity.  Bowel sounds present and normal in all 4 quadrants.  No abdominal bruits.  EXTREMITIES:  No clubbing, cyanosis, or edema.  Peripheral pulses palpable, 2+ and equal bilaterally.  SKIN:  Warm and dry.  NEUROLOGIC:  The patient is alert and oriented x3.  Cranial nerves 2-12 are normal.  Motor, cerebellar and sensory exam are grossly normal.    LABORATORY DATA:  Discussed and reviewed with the patient from 08/07, stable.    ASSESSMENT AND PLAN:  1.  Physical.  
Patient sees OB/GYN for the breast and Pap exam.  2.  For her high normal hemoglobin A1c, advised about working on the weight, cutting down starches and sweets and also exercise with 45-60 minutes every day.  3.  For acid reflux, controlled on Protonix, continue like she has been taking so far.      In 6 months, repeat lab tests and physical.  Tdap booster was given today as well.      Dictated By: Arelt Lau MD  Signing Provider: Arlet Lau MD    BG/msc ([id number])  DD: 08/20/2020 14:49:09 TD: 08/20/2020 14:55:23    Copy Sent To:   
- VANDA on admission - will hold lasix and enalapril for now to allow for permissive htn  - resume when renal fxn improves and pending neuro rec  - avoid nephrotoxic med
- VANDA on admission - will hold lasix   - resume when renal fxn improves  - avoid nephrotoxic med

## 2021-06-26 ENCOUNTER — EMERGENCY (EMERGENCY)
Facility: HOSPITAL | Age: 87
LOS: 1 days | Discharge: LEFT BEFORE DISCHARGE | End: 2021-06-26
Attending: EMERGENCY MEDICINE | Admitting: EMERGENCY MEDICINE
Payer: MEDICARE

## 2021-06-26 VITALS
WEIGHT: 225.09 LBS | SYSTOLIC BLOOD PRESSURE: 167 MMHG | RESPIRATION RATE: 16 BRPM | TEMPERATURE: 98 F | OXYGEN SATURATION: 99 % | HEIGHT: 71 IN | DIASTOLIC BLOOD PRESSURE: 86 MMHG | HEART RATE: 95 BPM

## 2021-06-26 VITALS
SYSTOLIC BLOOD PRESSURE: 161 MMHG | TEMPERATURE: 98 F | RESPIRATION RATE: 19 BRPM | HEART RATE: 91 BPM | DIASTOLIC BLOOD PRESSURE: 81 MMHG | OXYGEN SATURATION: 99 %

## 2021-06-26 DIAGNOSIS — D37.9 NEOPLASM OF UNCERTAIN BEHAVIOR OF DIGESTIVE ORGAN, UNSPECIFIED: Chronic | ICD-10-CM

## 2021-06-26 DIAGNOSIS — Z90.79 ACQUIRED ABSENCE OF OTHER GENITAL ORGAN(S): Chronic | ICD-10-CM

## 2021-06-26 PROBLEM — N40.0 BENIGN PROSTATIC HYPERPLASIA WITHOUT LOWER URINARY TRACT SYMPTOMS: Chronic | Status: ACTIVE | Noted: 2019-06-01

## 2021-06-26 PROBLEM — N39.0 URINARY TRACT INFECTION, SITE NOT SPECIFIED: Chronic | Status: ACTIVE | Noted: 2019-06-01

## 2021-06-26 PROBLEM — G45.9 TRANSIENT CEREBRAL ISCHEMIC ATTACK, UNSPECIFIED: Chronic | Status: ACTIVE | Noted: 2019-06-01

## 2021-06-26 PROBLEM — M48.00 SPINAL STENOSIS, SITE UNSPECIFIED: Chronic | Status: ACTIVE | Noted: 2019-06-01

## 2021-06-26 PROBLEM — K21.9 GASTRO-ESOPHAGEAL REFLUX DISEASE WITHOUT ESOPHAGITIS: Chronic | Status: ACTIVE | Noted: 2019-06-01

## 2021-06-26 PROBLEM — R33.9 RETENTION OF URINE, UNSPECIFIED: Chronic | Status: ACTIVE | Noted: 2019-06-01

## 2021-06-26 LAB
ALBUMIN SERPL ELPH-MCNC: 3.2 G/DL — LOW (ref 3.3–5)
ALP SERPL-CCNC: 97 U/L — SIGNIFICANT CHANGE UP (ref 40–120)
ALT FLD-CCNC: 25 U/L — SIGNIFICANT CHANGE UP (ref 12–78)
ANION GAP SERPL CALC-SCNC: 8 MMOL/L — SIGNIFICANT CHANGE UP (ref 5–17)
APTT BLD: 37 SEC — HIGH (ref 27.5–35.5)
AST SERPL-CCNC: 20 U/L — SIGNIFICANT CHANGE UP (ref 15–37)
BASOPHILS # BLD AUTO: 0.03 K/UL — SIGNIFICANT CHANGE UP (ref 0–0.2)
BASOPHILS NFR BLD AUTO: 0.5 % — SIGNIFICANT CHANGE UP (ref 0–2)
BILIRUB SERPL-MCNC: 0.4 MG/DL — SIGNIFICANT CHANGE UP (ref 0.2–1.2)
BUN SERPL-MCNC: 15 MG/DL — SIGNIFICANT CHANGE UP (ref 7–23)
CALCIUM SERPL-MCNC: 9.8 MG/DL — SIGNIFICANT CHANGE UP (ref 8.5–10.1)
CHLORIDE SERPL-SCNC: 105 MMOL/L — SIGNIFICANT CHANGE UP (ref 96–108)
CO2 SERPL-SCNC: 28 MMOL/L — SIGNIFICANT CHANGE UP (ref 22–31)
CREAT SERPL-MCNC: 1 MG/DL — SIGNIFICANT CHANGE UP (ref 0.5–1.3)
EOSINOPHIL # BLD AUTO: 0.09 K/UL — SIGNIFICANT CHANGE UP (ref 0–0.5)
EOSINOPHIL NFR BLD AUTO: 1.5 % — SIGNIFICANT CHANGE UP (ref 0–6)
GLUCOSE SERPL-MCNC: 161 MG/DL — HIGH (ref 70–99)
HCT VFR BLD CALC: 40.6 % — SIGNIFICANT CHANGE UP (ref 39–50)
HGB BLD-MCNC: 13.4 G/DL — SIGNIFICANT CHANGE UP (ref 13–17)
IMM GRANULOCYTES NFR BLD AUTO: 0.2 % — SIGNIFICANT CHANGE UP (ref 0–1.5)
INR BLD: 1.63 RATIO — HIGH (ref 0.88–1.16)
LYMPHOCYTES # BLD AUTO: 1.93 K/UL — SIGNIFICANT CHANGE UP (ref 1–3.3)
LYMPHOCYTES # BLD AUTO: 32 % — SIGNIFICANT CHANGE UP (ref 13–44)
MCHC RBC-ENTMCNC: 29.1 PG — SIGNIFICANT CHANGE UP (ref 27–34)
MCHC RBC-ENTMCNC: 33 GM/DL — SIGNIFICANT CHANGE UP (ref 32–36)
MCV RBC AUTO: 88.1 FL — SIGNIFICANT CHANGE UP (ref 80–100)
MONOCYTES # BLD AUTO: 0.46 K/UL — SIGNIFICANT CHANGE UP (ref 0–0.9)
MONOCYTES NFR BLD AUTO: 7.6 % — SIGNIFICANT CHANGE UP (ref 2–14)
NEUTROPHILS # BLD AUTO: 3.51 K/UL — SIGNIFICANT CHANGE UP (ref 1.8–7.4)
NEUTROPHILS NFR BLD AUTO: 58.2 % — SIGNIFICANT CHANGE UP (ref 43–77)
NRBC # BLD: 0 /100 WBCS — SIGNIFICANT CHANGE UP (ref 0–0)
PLATELET # BLD AUTO: 157 K/UL — SIGNIFICANT CHANGE UP (ref 150–400)
POTASSIUM SERPL-MCNC: 3.6 MMOL/L — SIGNIFICANT CHANGE UP (ref 3.5–5.3)
POTASSIUM SERPL-SCNC: 3.6 MMOL/L — SIGNIFICANT CHANGE UP (ref 3.5–5.3)
PROT SERPL-MCNC: 7 G/DL — SIGNIFICANT CHANGE UP (ref 6–8.3)
PROTHROM AB SERPL-ACNC: 18.6 SEC — HIGH (ref 10.6–13.6)
RBC # BLD: 4.61 M/UL — SIGNIFICANT CHANGE UP (ref 4.2–5.8)
RBC # FLD: 14.5 % — SIGNIFICANT CHANGE UP (ref 10.3–14.5)
SARS-COV-2 RNA SPEC QL NAA+PROBE: SIGNIFICANT CHANGE UP
SODIUM SERPL-SCNC: 141 MMOL/L — SIGNIFICANT CHANGE UP (ref 135–145)
TROPONIN I SERPL-MCNC: <.015 NG/ML — SIGNIFICANT CHANGE UP (ref 0.01–0.04)
WBC # BLD: 6.03 K/UL — SIGNIFICANT CHANGE UP (ref 3.8–10.5)
WBC # FLD AUTO: 6.03 K/UL — SIGNIFICANT CHANGE UP (ref 3.8–10.5)

## 2021-06-26 PROCEDURE — 85730 THROMBOPLASTIN TIME PARTIAL: CPT

## 2021-06-26 PROCEDURE — 93010 ELECTROCARDIOGRAM REPORT: CPT

## 2021-06-26 PROCEDURE — 86901 BLOOD TYPING SEROLOGIC RH(D): CPT

## 2021-06-26 PROCEDURE — 84484 ASSAY OF TROPONIN QUANT: CPT

## 2021-06-26 PROCEDURE — 80053 COMPREHEN METABOLIC PANEL: CPT

## 2021-06-26 PROCEDURE — 99285 EMERGENCY DEPT VISIT HI MDM: CPT | Mod: 25

## 2021-06-26 PROCEDURE — 86850 RBC ANTIBODY SCREEN: CPT

## 2021-06-26 PROCEDURE — 99284 EMERGENCY DEPT VISIT MOD MDM: CPT

## 2021-06-26 PROCEDURE — 36415 COLL VENOUS BLD VENIPUNCTURE: CPT

## 2021-06-26 PROCEDURE — 93005 ELECTROCARDIOGRAM TRACING: CPT

## 2021-06-26 PROCEDURE — 71045 X-RAY EXAM CHEST 1 VIEW: CPT

## 2021-06-26 PROCEDURE — 85025 COMPLETE CBC W/AUTO DIFF WBC: CPT

## 2021-06-26 PROCEDURE — 82962 GLUCOSE BLOOD TEST: CPT

## 2021-06-26 PROCEDURE — 70450 CT HEAD/BRAIN W/O DYE: CPT | Mod: 26,MA

## 2021-06-26 PROCEDURE — 86900 BLOOD TYPING SEROLOGIC ABO: CPT

## 2021-06-26 PROCEDURE — 85610 PROTHROMBIN TIME: CPT

## 2021-06-26 PROCEDURE — 70450 CT HEAD/BRAIN W/O DYE: CPT | Mod: MA

## 2021-06-26 PROCEDURE — 71045 X-RAY EXAM CHEST 1 VIEW: CPT | Mod: 26

## 2021-06-26 PROCEDURE — 87635 SARS-COV-2 COVID-19 AMP PRB: CPT

## 2021-06-26 RX ORDER — CLOPIDOGREL BISULFATE 75 MG/1
1 TABLET, FILM COATED ORAL
Qty: 0 | Refills: 0 | DISCHARGE

## 2021-06-26 RX ORDER — ENOXAPARIN SODIUM 100 MG/ML
20 INJECTION SUBCUTANEOUS
Qty: 0 | Refills: 0 | DISCHARGE

## 2021-06-26 RX ORDER — NADOLOL 80 MG/1
1 TABLET ORAL
Qty: 0 | Refills: 0 | DISCHARGE

## 2021-06-26 RX ORDER — DILTIAZEM HCL 120 MG
1 CAPSULE, EXT RELEASE 24 HR ORAL
Qty: 0 | Refills: 0 | DISCHARGE

## 2021-06-26 RX ORDER — ASCORBIC ACID 60 MG
1 TABLET,CHEWABLE ORAL
Qty: 0 | Refills: 0 | DISCHARGE

## 2021-06-26 RX ORDER — POTASSIUM CHLORIDE 20 MEQ
1 PACKET (EA) ORAL
Qty: 0 | Refills: 0 | DISCHARGE

## 2021-06-26 RX ORDER — FUROSEMIDE 40 MG
1 TABLET ORAL
Qty: 0 | Refills: 0 | DISCHARGE

## 2021-06-26 RX ORDER — CEFPODOXIME PROXETIL 100 MG
1 TABLET ORAL
Qty: 0 | Refills: 0 | DISCHARGE

## 2021-06-26 RX ORDER — APIXABAN 2.5 MG/1
1 TABLET, FILM COATED ORAL
Qty: 0 | Refills: 0 | DISCHARGE

## 2021-06-26 RX ORDER — MOMETASONE FUROATE AND FORMOTEROL FUMARATE DIHYDRATE 200; 5 UG/1; UG/1
2 AEROSOL RESPIRATORY (INHALATION)
Qty: 0 | Refills: 0 | DISCHARGE

## 2021-06-26 RX ORDER — OMEPRAZOLE 10 MG/1
0 CAPSULE, DELAYED RELEASE ORAL
Qty: 0 | Refills: 0 | DISCHARGE

## 2021-06-26 RX ORDER — OMEPRAZOLE 10 MG/1
1 CAPSULE, DELAYED RELEASE ORAL
Qty: 0 | Refills: 0 | DISCHARGE

## 2021-06-26 RX ORDER — SULFASALAZINE 500 MG
1 TABLET ORAL
Qty: 0 | Refills: 0 | DISCHARGE

## 2021-06-26 RX ORDER — ENOXAPARIN SODIUM 100 MG/ML
28 INJECTION SUBCUTANEOUS
Qty: 0 | Refills: 0 | DISCHARGE

## 2021-06-26 RX ORDER — CHOLECALCIFEROL (VITAMIN D3) 125 MCG
1 CAPSULE ORAL
Qty: 0 | Refills: 0 | DISCHARGE

## 2021-06-26 RX ORDER — MORPHINE 10 MG/ML
2 SOLUTION ORAL
Qty: 0 | Refills: 0 | DISCHARGE

## 2021-06-26 NOTE — ED PROVIDER NOTE - NEUROLOGICAL, MLM
Alert and oriented, no focal deficits, no motor or sensory deficits. NIH=0 Dysphagia=Passed Sacral dimple in

## 2021-06-26 NOTE — ED PROVIDER NOTE - CARE PROVIDER_API CALL
Kelly Robledo  NEUROLOGY  [address] [place] [address]  Phone: [phone]  Fax: [phone]  Follow Up Time:

## 2021-06-26 NOTE — ED ADULT NURSE NOTE - CHIEF COMPLAINT QUOTE
Pt states he felt confused after breakfast (CVA hx) Family states pt was confused @approx 6:30-7am when they spoke via phone

## 2021-06-26 NOTE — ED PROVIDER NOTE - OBJECTIVE STATEMENT
88 yo white male with H/O Asthma    Cancer of rectum    Chronic GERD    Enlarged prostate    Hypertension    RBBB (right bundle branch block)    Spinal stenosis    TIA (transient ischemic attack)    Urinary retention with incomplete bladder emptying and  UTI (urinary tract infection), awoke this morning initially feeling well but then at approximately 0600 found that he could not operate his IPad which he is very familiar with. Patient then called his daughter who told him to go back to sleep. Upon re-awakening patient was found to have altered speech/problem in articulation, witnessed by daughter. EMS was called and transported patient to ED for further evaluation and management. Denied any headache, chest pain, nausea, vomiting, weakness or paraesthesia.

## 2021-06-26 NOTE — ED ADULT NURSE NOTE - OBJECTIVE STATEMENT
Pt. received alert and oriented x3 with chief complaint of confusion and slurred speech around 0630 this morning. No symptoms at current time. Pt. placed on continuous cardiac monitor and continuous pulse ox. Pt. taken to CT after MD assessment. Pt. presents w/ subq glucometer to back of right arm.

## 2021-06-26 NOTE — ED PROVIDER NOTE - NSFOLLOWUPINSTRUCTIONS_ED_ALL_ED_FT
Rest  Follow-up with your doctor this week  Return here or seek medical attention if you develop any symptoms such as weakness, numbness, confusion or altered speech  Return here if needed

## 2021-06-26 NOTE — ED ADULT TRIAGE NOTE - CHIEF COMPLAINT QUOTE
Pt states he felt confused after breakfast (CVA hx) Pt states he felt confused after breakfast (CVA hx) Family states pt was confused @approx 6:30-7am when they spoke via phone

## 2021-06-26 NOTE — ED ADULT NURSE NOTE - NSIMPLEMENTINTERV_GEN_ALL_ED
Implemented All Fall with Harm Risk Interventions:  Wright to call system. Call bell, personal items and telephone within reach. Instruct patient to call for assistance. Room bathroom lighting operational. Non-slip footwear when patient is off stretcher. Physically safe environment: no spills, clutter or unnecessary equipment. Stretcher in lowest position, wheels locked, appropriate side rails in place. Provide visual cue, wrist band, yellow gown, etc. Monitor gait and stability. Monitor for mental status changes and reorient to person, place, and time. Review medications for side effects contributing to fall risk. Reinforce activity limits and safety measures with patient and family. Provide visual clues: red socks.

## 2021-06-26 NOTE — ED PROVIDER NOTE - CONSTITUTIONAL, MLM
normal... Well appearing, pleasant elderly white male, awake, alert, oriented to person, place, time/situation and in no apparent distress.

## 2021-06-26 NOTE — ED PROVIDER NOTE - CLINICAL SUMMARY MEDICAL DECISION MAKING FREE TEXT BOX
Confusion this morning at 0600 followed by altered speech at 1000 witnessed by daughter now back to normal requiring evaluation, labs, ecg and imaging.

## 2021-06-26 NOTE — ED PROVIDER NOTE - PROGRESS NOTE DETAILS
Feels well at this time. Admission indicated to patient and urged but even with daughter present he refuses as he wants/needs to care for his wife who has Alzheimers Dz. Also refuses more CT scan I.E CTA  The patient has decided to leave against medical advice.  It has been explained to the patient that leaving prior to completion of work up and treatment may result in recurrent or worsening of symptoms, severe permanent disability, pain and suffering, and/or even death.  The patient has demonstrated comprehension and verbalizes understanding of these risks.  The patient has been told that he/she must return to the ER immediately for return of symptoms, worsening symptoms, or any concerning symptoms.  The patient has also been told that he/she may return to the ER immediately at any time if he/she changes their mind and wishes to resume care.The patient has been given the opportunity to ask questions about his/her medical condition.

## 2021-06-26 NOTE — ED PROVIDER NOTE - PATIENT PORTAL LINK FT
You can access the FollowMyHealth Patient Portal offered by [facility] by registering at the following website: [email or web address]. By joining [employer]’s FollowMyHealth portal, you will also be able to view your health information using other applications (apps) compatible with our system.

## 2021-06-26 NOTE — ED ADULT NURSE NOTE - PMH
Asthma    Cancer of rectum    Chronic GERD    Enlarged prostate    Hypertension    RBBB (right bundle branch block)    Spinal stenosis    TIA (transient ischemic attack)    Urinary retention with incomplete bladder emptying    UTI (urinary tract infection)

## 2022-01-11 NOTE — PHYSICAL THERAPY INITIAL EVALUATION ADULT - GAIT DEVIATIONS NOTED, PT EVAL
DISPLAY PLAN FREE TEXT decreased step length/decreased stride length/decreased bertram/increased time in double stance

## 2022-11-03 ENCOUNTER — NON-APPOINTMENT (OUTPATIENT)
Age: 88
End: 2022-11-03

## 2023-01-06 ENCOUNTER — APPOINTMENT (OUTPATIENT)
Dept: UROLOGY | Facility: CLINIC | Age: 89
End: 2023-01-06

## 2023-06-24 ENCOUNTER — EMERGENCY (EMERGENCY)
Facility: HOSPITAL | Age: 89
LOS: 1 days | Discharge: HOME | End: 2023-06-24
Attending: EMERGENCY MEDICINE | Admitting: EMERGENCY MEDICINE
Payer: MEDICARE

## 2023-06-24 VITALS
RESPIRATION RATE: 18 BRPM | TEMPERATURE: 99 F | DIASTOLIC BLOOD PRESSURE: 83 MMHG | WEIGHT: 205.03 LBS | SYSTOLIC BLOOD PRESSURE: 148 MMHG | HEART RATE: 77 BPM | OXYGEN SATURATION: 99 % | HEIGHT: 74 IN

## 2023-06-24 DIAGNOSIS — D37.9 NEOPLASM OF UNCERTAIN BEHAVIOR OF DIGESTIVE ORGAN, UNSPECIFIED: Chronic | ICD-10-CM

## 2023-06-24 DIAGNOSIS — Z90.79 ACQUIRED ABSENCE OF OTHER GENITAL ORGAN(S): Chronic | ICD-10-CM

## 2023-06-24 PROCEDURE — G1004: CPT

## 2023-06-24 PROCEDURE — 73562 X-RAY EXAM OF KNEE 3: CPT | Mod: 26,RT

## 2023-06-24 PROCEDURE — 72125 CT NECK SPINE W/O DYE: CPT | Mod: MG

## 2023-06-24 PROCEDURE — 70450 CT HEAD/BRAIN W/O DYE: CPT | Mod: MG

## 2023-06-24 PROCEDURE — 70450 CT HEAD/BRAIN W/O DYE: CPT | Mod: 26,MG

## 2023-06-24 PROCEDURE — 99284 EMERGENCY DEPT VISIT MOD MDM: CPT | Mod: 25

## 2023-06-24 PROCEDURE — 73562 X-RAY EXAM OF KNEE 3: CPT

## 2023-06-24 PROCEDURE — 72125 CT NECK SPINE W/O DYE: CPT | Mod: 26,MG

## 2023-06-24 PROCEDURE — 99285 EMERGENCY DEPT VISIT HI MDM: CPT

## 2023-06-24 NOTE — ED ADULT NURSE NOTE - NSFALLHARMRISKINTERV_ED_ALL_ED

## 2023-06-24 NOTE — ED PROVIDER NOTE - CARE PROVIDER_API CALL
Jean Schneider  Orthopaedic Surgery  [address], [address]  [place] [address]  Phone: [phone]  Fax: [phone]  Follow Up Time:     LALIT PORTER  [address] [place] [address]  Phone: ([phone]  Fax: ()-  Follow Up Time:

## 2023-06-24 NOTE — ED PROVIDER NOTE - DIFFERENTIAL DIAGNOSIS
Rule out acute intracranial hemorrhage, cervical spine fracture, right knee fracture Differential Diagnosis

## 2023-06-24 NOTE — ED PROVIDER NOTE - PROGRESS NOTE DETAILS
Discussed with patient and family regarding fall precautions, head injury precautions instructions, knee pain precaution instructions, and importance of close prompt follow-up with primary care, and orthopedics as needed.

## 2023-06-24 NOTE — ED PROVIDER NOTE - MUSCULOSKELETAL, MLM
Spine appears normal,mild paraspinal lower cervical tend, no midline tend,  range of motion is not limited, no muscle or joint tenderness otherwise noted

## 2023-06-24 NOTE — ED ADULT TRIAGE NOTE - CHIEF COMPLAINT QUOTE
Fall this am.  Normally uses walker, wasn't using for several steps.  Hit head/.  No LOC.  On elequis

## 2023-06-24 NOTE — ED PROVIDER NOTE - PATIENT PORTAL LINK FT
You can access the FollowMyHealth Patient Portal offered by [facility] by registering at the following website: [email or web address]. By joining [employer]’s FollowMyHealth portal, you will also be able to view your health information using other applications (apps) compatible with our system.

## 2023-06-24 NOTE — ED PROVIDER NOTE - OBJECTIVE STATEMENT
89-year-old male with a history of diabetes, cancer history, CVA, atrial fibrillation on Eliquis, melanoma, hypertension, high cholesterol, prostate disease presents with mechanical trip and fall today.  Patient was walking in his compression stockings, without shoes when he made a turn, lost his balance and fell.  Patient landed on his right knee and then fell backwards hitting the back of his head on the ground.  No loss of consciousness.  Patient planes of mild headache.  No nausea or vomiting.  No neck pain or back pain.  No numbness/tingling/focal weakness.  No radiation of pain to the arms or legs.  No recent illness.  No cough/URI.  No aggravating or alleviating factors otherwise noted.  No other acute complaints.  Patient previously vaccinated for COVID.

## 2023-06-24 NOTE — ED PROVIDER NOTE - CLINICAL SUMMARY MEDICAL DECISION MAKING FREE TEXT BOX
Mechanical fall today with right knee injury, head injury.  Patient on Eliquis.  Will check x-ray, CT head and neck, outpatient follow-up

## 2023-06-24 NOTE — ED ADULT NURSE NOTE - OBJECTIVE STATEMENT
Comes to ED after having fall.  Pt normally uses a walker to walk but did not use it and lost balance and hit head.  Pt on eliquis.

## 2023-06-24 NOTE — ED ADULT NURSE NOTE - BREATH SOUNDS, LEFT
My Asthma Action Plan    Name: Pily Vargas   YOB: 1981  Date: 4/4/2023   My doctor: TRELL Omalley CNP   My clinic: [facility] [place]        My Rescue Medicine:   Albuterol inhaler (Proair/Ventolin/Proventil HFA)  2-4 puffs EVERY 4 HOURS as needed. Use a spacer if recommended by your provider.   My Asthma Severity:   Intermittent / Exercise Induced  Know your asthma triggers:              GREEN ZONE   Good Control    I feel good    No cough or wheeze    Can work, sleep and play without asthma symptoms       Take your asthma control medicine every day.     1. If exercise triggers your asthma, take your rescue medication    15 minutes before exercise or sports, and    During exercise if you have asthma symptoms  2. Spacer to use with inhaler: If you have a spacer, make sure to use it with your inhaler             YELLOW ZONE Getting Worse  I have ANY of these:    I do not feel good    Cough or wheeze    Chest feels tight    Wake up at night   1. Keep taking your Green Zone medications  2. Start taking your rescue medicine:    every 20 minutes for up to 1 hour. Then every 4 hours for 24-48 hours.  3. If you stay in the Yellow Zone for more than 12-24 hours, contact your doctor.  4. If you do not return to the Green Zone in 12-24 hours or you get worse, start taking your oral steroid medicine if prescribed by your provider.           RED ZONE Medical Alert - Get Help  I have ANY of these:    I feel awful    Medicine is not helping    Breathing getting harder    Trouble walking or talking    Nose opens wide to breathe       1. Take your rescue medicine NOW  2. If your provider has prescribed an oral steroid medicine, start taking it NOW  3. Call your doctor NOW  4. If you are still in the Red Zone after 20 minutes and you have not reached your doctor:    Take your rescue medicine again and    Call 911 or go to the emergency room right away    See your regular doctor within 2  weeks of an Emergency Room or Urgent Care visit for follow-up treatment.          Annual Reminders:  Meet with Asthma Educator,  Flu Shot in the Fall, consider Pneumonia Vaccination for patients with asthma (aged 19 and older).    Pharmacy:    [id number] [place] [facility] [address]  [id number] [place] [facility] [address] [place] [address] [address]  [id number] [place], [place] - [address] [facility] [place] - [address]    Electronically signed by TRELL Omalley CNP   Date: 04/04/23                    Asthma Triggers  How To Control Things That Make Your Asthma Worse    Triggers are things that make your asthma worse.  Look at the list below to help you find your triggers and   what you can do about them. You can help prevent asthma flare-ups by staying away from your triggers.      Trigger                                                          What you can do   Cigarette Smoke  Tobacco smoke can make asthma worse. Do not allow smoking in your home, car or around you.  Be sure no one smokes at a child s day care or school.  If you smoke, ask your health care provider for ways to help you quit.  Ask family members to quit too.  Ask your health care provider for a referral to Quit Plan to help you quit smoking, or call 7-591-367-PLAN.     Colds, Flu, Bronchitis  These are common triggers of asthma. Wash your hands often.  Don t touch your eyes, nose or mouth.  Get a flu shot every year.     Dust Mites  These are tiny bugs that live in cloth or carpet. They are too small to see. Wash sheets and blankets in hot water every week.   Encase pillows and mattress in dust mite proof covers.  Avoid having carpet if you can. If you have carpet, vacuum weekly.   Use a dust mask and HEPA vacuum.   Pollen and Outdoor Mold  Some people are allergic to trees, grass, or weed pollen, or molds. Try to keep your windows closed.  Limit time out doors when  pollen count is high.   Ask you health care provider about taking medicine during allergy season.     Animal Dander  Some people are allergic to skin flakes, urine or saliva from pets with fur or feathers. Keep pets with fur or feathers out of your home.    If you can t keep the pet outdoors, then keep the pet out of your bedroom.  Keep the bedroom door closed.  Keep pets off cloth furniture and away from stuffed toys.     Mice, Rats, and Cockroaches  Some people are allergic to the waste from these pests.   Cover food and garbage.  Clean up spills and food crumbs.  Store grease in the refrigerator.   Keep food out of the bedroom.   Indoor Mold  This can be a trigger if your home has high moisture. Fix leaking faucets, pipes, or other sources of water.   Clean moldy surfaces.  Dehumidify basement if it is damp and smelly.   Smoke, Strong Odors, and Sprays  These can reduce air quality. Stay away from strong odors and sprays, such as perfume, powder, hair spray, paints, smoke incense, paint, cleaning products, candles and new carpet.   Exercise or Sports  Some people with asthma have this trigger. Be active!  Ask your doctor about taking medicine before sports or exercise to prevent symptoms.    Warm up for 5-10 minutes before and after sports or exercise.     Other Triggers of Asthma  Cold air:  Cover your nose and mouth with a scarf.  Sometimes laughing or crying can be a trigger.  Some medicines and food can trigger asthma.      clear

## 2023-06-24 NOTE — ED PROVIDER NOTE - NSFOLLOWUPINSTRUCTIONS_ED_ALL_ED_FT
1.  Follow-up with your Primary Medical Doctor. Call today / next business day for prompt follow-up.  2.  Return to Emergency room for any worsening or persistent pain, shortness of breath, chest pains, abdominal pain, numbness, tingling, headaches, vomiting, visual changes, dizziness, weakness, fever, if you are having difficulty getting around or you feel unsteady, or if you have any other concerning symptoms.  3.  See attached instruction sheets for additional information, including information regarding signs and symptoms to look out for, reasons to seek immediate care and other important instructions.  4.  Make sure to take your time with walking, especially when first standing up.  5.  Ace wrap as needed for the right knee  6.  Follow-up with orthopedics, call Monday for prompt follow-up.

## 2023-07-20 ENCOUNTER — EMERGENCY (EMERGENCY)
Facility: HOSPITAL | Age: 89
LOS: 1 days | Discharge: HOME | End: 2023-07-20
Attending: STUDENT IN AN ORGANIZED HEALTH CARE EDUCATION/TRAINING PROGRAM | Admitting: STUDENT IN AN ORGANIZED HEALTH CARE EDUCATION/TRAINING PROGRAM
Payer: MEDICARE

## 2023-07-20 VITALS
DIASTOLIC BLOOD PRESSURE: 67 MMHG | RESPIRATION RATE: 18 BRPM | SYSTOLIC BLOOD PRESSURE: 142 MMHG | HEART RATE: 61 BPM | OXYGEN SATURATION: 98 % | TEMPERATURE: 98 F

## 2023-07-20 VITALS
OXYGEN SATURATION: 97 % | HEART RATE: 68 BPM | HEIGHT: 74 IN | TEMPERATURE: 98 F | WEIGHT: 175.05 LBS | RESPIRATION RATE: 20 BRPM | SYSTOLIC BLOOD PRESSURE: 172 MMHG | DIASTOLIC BLOOD PRESSURE: 74 MMHG

## 2023-07-20 DIAGNOSIS — D37.9 NEOPLASM OF UNCERTAIN BEHAVIOR OF DIGESTIVE ORGAN, UNSPECIFIED: Chronic | ICD-10-CM

## 2023-07-20 DIAGNOSIS — Z90.79 ACQUIRED ABSENCE OF OTHER GENITAL ORGAN(S): Chronic | ICD-10-CM

## 2023-07-20 LAB
ALBUMIN SERPL ELPH-MCNC: 3.3 G/DL — SIGNIFICANT CHANGE UP (ref 3.3–5)
ALP SERPL-CCNC: 82 U/L — SIGNIFICANT CHANGE UP (ref 40–120)
ALT FLD-CCNC: 26 U/L — SIGNIFICANT CHANGE UP (ref 12–78)
ANION GAP SERPL CALC-SCNC: 2 MMOL/L — LOW (ref 5–17)
APPEARANCE UR: CLEAR — SIGNIFICANT CHANGE UP
APTT BLD: 34 SEC — SIGNIFICANT CHANGE UP (ref 27.5–35.5)
AST SERPL-CCNC: 20 U/L — SIGNIFICANT CHANGE UP (ref 15–37)
BASOPHILS # BLD AUTO: 0.02 K/UL — SIGNIFICANT CHANGE UP (ref 0–0.2)
BASOPHILS NFR BLD AUTO: 0.4 % — SIGNIFICANT CHANGE UP (ref 0–2)
BILIRUB SERPL-MCNC: 0.5 MG/DL — SIGNIFICANT CHANGE UP (ref 0.2–1.2)
BILIRUB UR-MCNC: NEGATIVE — SIGNIFICANT CHANGE UP
BUN SERPL-MCNC: 11 MG/DL — SIGNIFICANT CHANGE UP (ref 7–23)
CALCIUM SERPL-MCNC: 9.9 MG/DL — SIGNIFICANT CHANGE UP (ref 8.5–10.1)
CHLORIDE SERPL-SCNC: 96 MMOL/L — SIGNIFICANT CHANGE UP (ref 96–108)
CK SERPL-CCNC: 81 U/L — SIGNIFICANT CHANGE UP (ref 26–308)
CO2 SERPL-SCNC: 33 MMOL/L — HIGH (ref 22–31)
COLOR SPEC: YELLOW — SIGNIFICANT CHANGE UP
CREAT SERPL-MCNC: 0.99 MG/DL — SIGNIFICANT CHANGE UP (ref 0.5–1.3)
DIFF PNL FLD: NEGATIVE — SIGNIFICANT CHANGE UP
EGFR: 73 ML/MIN/1.73M2 — SIGNIFICANT CHANGE UP
EOSINOPHIL # BLD AUTO: 0.07 K/UL — SIGNIFICANT CHANGE UP (ref 0–0.5)
EOSINOPHIL NFR BLD AUTO: 1.3 % — SIGNIFICANT CHANGE UP (ref 0–6)
GLUCOSE SERPL-MCNC: 91 MG/DL — SIGNIFICANT CHANGE UP (ref 70–99)
GLUCOSE UR QL: NEGATIVE MG/DL — SIGNIFICANT CHANGE UP
HCT VFR BLD CALC: 36 % — LOW (ref 39–50)
HGB BLD-MCNC: 12.1 G/DL — LOW (ref 13–17)
IMM GRANULOCYTES NFR BLD AUTO: 0.2 % — SIGNIFICANT CHANGE UP (ref 0–0.9)
INR BLD: 1.55 RATIO — HIGH (ref 0.88–1.16)
KETONES UR-MCNC: NEGATIVE MG/DL — SIGNIFICANT CHANGE UP
LACTATE SERPL-SCNC: 0.8 MMOL/L — SIGNIFICANT CHANGE UP (ref 0.7–2)
LEUKOCYTE ESTERASE UR-ACNC: ABNORMAL
LIDOCAIN IGE QN: 102 U/L — SIGNIFICANT CHANGE UP (ref 73–393)
LYMPHOCYTES # BLD AUTO: 1.74 K/UL — SIGNIFICANT CHANGE UP (ref 1–3.3)
LYMPHOCYTES # BLD AUTO: 32.9 % — SIGNIFICANT CHANGE UP (ref 13–44)
MAGNESIUM SERPL-MCNC: 1.7 MG/DL — SIGNIFICANT CHANGE UP (ref 1.6–2.6)
MCHC RBC-ENTMCNC: 30.5 PG — SIGNIFICANT CHANGE UP (ref 27–34)
MCHC RBC-ENTMCNC: 33.6 GM/DL — SIGNIFICANT CHANGE UP (ref 32–36)
MCV RBC AUTO: 90.7 FL — SIGNIFICANT CHANGE UP (ref 80–100)
MONOCYTES # BLD AUTO: 0.44 K/UL — SIGNIFICANT CHANGE UP (ref 0–0.9)
MONOCYTES NFR BLD AUTO: 8.3 % — SIGNIFICANT CHANGE UP (ref 2–14)
NEUTROPHILS # BLD AUTO: 3.01 K/UL — SIGNIFICANT CHANGE UP (ref 1.8–7.4)
NEUTROPHILS NFR BLD AUTO: 56.9 % — SIGNIFICANT CHANGE UP (ref 43–77)
NITRITE UR-MCNC: NEGATIVE — SIGNIFICANT CHANGE UP
NRBC # BLD: 0 /100 WBCS — SIGNIFICANT CHANGE UP (ref 0–0)
NT-PROBNP SERPL-SCNC: 1058 PG/ML — HIGH (ref 0–450)
PH UR: 7.5 — SIGNIFICANT CHANGE UP (ref 5–8)
PLATELET # BLD AUTO: 148 K/UL — LOW (ref 150–400)
POTASSIUM SERPL-MCNC: 3.8 MMOL/L — SIGNIFICANT CHANGE UP (ref 3.5–5.3)
POTASSIUM SERPL-SCNC: 3.8 MMOL/L — SIGNIFICANT CHANGE UP (ref 3.5–5.3)
PROT SERPL-MCNC: 6.8 G/DL — SIGNIFICANT CHANGE UP (ref 6–8.3)
PROT UR-MCNC: NEGATIVE MG/DL — SIGNIFICANT CHANGE UP
PROTHROM AB SERPL-ACNC: 18.2 SEC — HIGH (ref 10.5–13.4)
RAPID RVP RESULT: SIGNIFICANT CHANGE UP
RBC # BLD: 3.97 M/UL — LOW (ref 4.2–5.8)
RBC # FLD: 13 % — SIGNIFICANT CHANGE UP (ref 10.3–14.5)
SARS-COV-2 RNA SPEC QL NAA+PROBE: SIGNIFICANT CHANGE UP
SODIUM SERPL-SCNC: 131 MMOL/L — LOW (ref 135–145)
SP GR SPEC: 1 — LOW (ref 1–1.03)
TROPONIN I, HIGH SENSITIVITY RESULT: 12.5 NG/L — SIGNIFICANT CHANGE UP
TSH SERPL-MCNC: 0.44 UIU/ML — SIGNIFICANT CHANGE UP (ref 0.36–3.74)
UROBILINOGEN FLD QL: 0.2 MG/DL — SIGNIFICANT CHANGE UP (ref 0.2–1)
WBC # BLD: 5.29 K/UL — SIGNIFICANT CHANGE UP (ref 3.8–10.5)
WBC # FLD AUTO: 5.29 K/UL — SIGNIFICANT CHANGE UP (ref 3.8–10.5)

## 2023-07-20 PROCEDURE — 93010 ELECTROCARDIOGRAM REPORT: CPT

## 2023-07-20 PROCEDURE — 99285 EMERGENCY DEPT VISIT HI MDM: CPT

## 2023-07-20 PROCEDURE — 93005 ELECTROCARDIOGRAM TRACING: CPT

## 2023-07-20 PROCEDURE — 71045 X-RAY EXAM CHEST 1 VIEW: CPT | Mod: 26

## 2023-07-20 PROCEDURE — 83690 ASSAY OF LIPASE: CPT

## 2023-07-20 PROCEDURE — 80053 COMPREHEN METABOLIC PANEL: CPT

## 2023-07-20 PROCEDURE — 36415 COLL VENOUS BLD VENIPUNCTURE: CPT

## 2023-07-20 PROCEDURE — 83735 ASSAY OF MAGNESIUM: CPT

## 2023-07-20 PROCEDURE — 83605 ASSAY OF LACTIC ACID: CPT

## 2023-07-20 PROCEDURE — 87086 URINE CULTURE/COLONY COUNT: CPT

## 2023-07-20 PROCEDURE — 83880 ASSAY OF NATRIURETIC PEPTIDE: CPT

## 2023-07-20 PROCEDURE — 81001 URINALYSIS AUTO W/SCOPE: CPT

## 2023-07-20 PROCEDURE — 85730 THROMBOPLASTIN TIME PARTIAL: CPT

## 2023-07-20 PROCEDURE — 71045 X-RAY EXAM CHEST 1 VIEW: CPT

## 2023-07-20 PROCEDURE — 70450 CT HEAD/BRAIN W/O DYE: CPT | Mod: 26,QQ

## 2023-07-20 PROCEDURE — 84443 ASSAY THYROID STIM HORMONE: CPT

## 2023-07-20 PROCEDURE — 70450 CT HEAD/BRAIN W/O DYE: CPT | Mod: QQ

## 2023-07-20 PROCEDURE — 82962 GLUCOSE BLOOD TEST: CPT

## 2023-07-20 PROCEDURE — 84484 ASSAY OF TROPONIN QUANT: CPT

## 2023-07-20 PROCEDURE — 85610 PROTHROMBIN TIME: CPT

## 2023-07-20 PROCEDURE — 0225U NFCT DS DNA&RNA 21 SARSCOV2: CPT

## 2023-07-20 PROCEDURE — 99285 EMERGENCY DEPT VISIT HI MDM: CPT | Mod: 25

## 2023-07-20 PROCEDURE — 82550 ASSAY OF CK (CPK): CPT

## 2023-07-20 PROCEDURE — 85025 COMPLETE CBC W/AUTO DIFF WBC: CPT

## 2023-07-20 RX ORDER — SODIUM CHLORIDE 9 MG/ML
500 INJECTION INTRAMUSCULAR; INTRAVENOUS; SUBCUTANEOUS ONCE
Refills: 0 | Status: COMPLETED | OUTPATIENT
Start: 2023-07-20 | End: 2023-07-20

## 2023-07-20 NOTE — ED PROVIDER NOTE - NSFOLLOWUPCLINICS_GEN_ALL_ED_FT
Cardiology at [facility]  Cardiology  [address] [place] [address]  Phone: [phone]  Fax:     Cardiology at [facility]  Cardiology  [address] [place] [address]  Phone: [phone]  Fax:

## 2023-07-20 NOTE — ED PROVIDER NOTE - PHYSICAL EXAMINATION
GEN - NAD, appears fatigued, A&Ox3  HEAD - NC/AT  EYES - PERRL, EOMI  ENT - Airway patent, mucous membranes dry  PULMONARY - CTA b/l, symmetric breath sounds, no W/R/R  CARDIAC - +S1S2, RRR, no M/G/R, no JVD  ABDOMEN - ND, NT, soft, no guarding, no rebound, no masses, no rigidity   - No CVA TTP  EXTREMITIES - FROM, symmetric pulses, capillary refill <2 seconds, 1+ pitting edema in b/l LE  SKIN - No rash or bruising  NEUROLOGIC - Alert, speech clear, no focal deficits  PSYCH - Normal mood/affect, normal insight

## 2023-07-20 NOTE — ED PROVIDER NOTE - PR
T(C): 37.2, Max: 37.4 (06-15 @ 09:36)  HR: 89 (89 - 94)  BP: 123/78 (116/65 - 123/78)  RR: 20 (16 - 20)  SpO2: 99% (97% - 99%)  Wt(kg): --    Gen: AAOx3, NAD  HEENT: NCAT, EOMI  Neck: Supple  CV: nml S1S2, RRR  Lungs: poor air entry throughout, mild end exp wheezing  Abd: Soft, NT, ND, BS+  Ext: No edema  Neuro: Non focal
Yes
N/A

## 2023-07-20 NOTE — ED ADULT NURSE NOTE - OBJECTIVE STATEMENT
89y M from home, BIBEMS, c/o generalized weakness x1 day. daughter at bedside, states pt feeling weak overall for weeks, worsening past day. pt denies CP/palpitations, SOB, dizziness, blurry vision, N/V/D/fever/chills. pt repetitively requested urinary catheter, as per daughter "the private home aide does it a dozen times a day." pt denies urogenital or lower abdominal pain, abdomen non-distended, not taut, non-tender. RN recommended utilizing non-invasive method first, urinal or condom cath due to infection risk of inserting repetitive robertson catheter's. pt assisted to standing, urinal utilized, 350mL clear straw urine output, specimen sent to lab. #20 IV to left AC, labs sent.

## 2023-07-20 NOTE — ED ADULT TRIAGE NOTE - NS ED NURSE BANDS TYPE
Name band; Banner Transposition Flap Text: The defect edges were debeveled with a #15 scalpel blade.  Given the location of the defect and the proximity to free margins a Banner transposition flap was deemed most appropriate.  Using a sterile surgical marker, an appropriate flap drawn around the defect. The area thus outlined was incised deep to adipose tissue with a #15 scalpel blade.  The skin margins were undermined to an appropriate distance in all directions utilizing iris scissors.

## 2023-07-20 NOTE — ED PROVIDER NOTE - NSFOLLOWUPINSTRUCTIONS_ED_ALL_ED_FT
Please follow-up with your cardiologist as soon as possible for your elevated BNP to get an ultrasound of your heart (echocardiogram).    Continue to stay hydrated.    Weakness    Weakness is a lack of strength. You may feel weak all over your body (generalized), or you may feel weak in one part of your body (focal). Common causes of weakness include:  Infection and disorders of the body's defense system (immune system).  Physical exhaustion.  Internal bleeding or other blood loss that results in a lack of red blood cells (anemia).  Dehydration.  An imbalance in mineral (electrolyte) levels, such as potassium.  Chronic kidney or liver disease.  Cancer.  Other causes include:  Some medicines or cancer treatment.  Stress, anxiety, or depression.  Heart disease, circulation problems, or stroke.  Nervous system disorders.  Thyroid disorders.  Loss of muscle strength because of age or inactivity.  Poor sleep quality or sleep disorders.  The cause of your weakness may not be known. Some causes of weakness can be serious, so it is important to see your health care provider.    Follow these instructions at home:  Activity    Rest as needed.  Try to get enough sleep. Most adults need 7–8 hours of quality sleep each night. Talk to your health care provider about how much sleep you need.  Do exercises, such as arm curls and leg raises, for 30 minutes at least 2 days a week or as told by your health care provider. This helps build muscle strength.  Consider working with a physical therapist or  who can develop an exercise plan to help you gain muscle strength.    General instructions    A plate with examples of foods in a healthy diet.  Take over-the-counter and prescription medicines only as told by your health care provider.  Eat a healthy, well-balanced diet. This includes:  Proteins to build muscles, such as lean meats and fish.  Fresh fruits and vegetables.  Carbohydrates to boost energy, such as whole grains.  Drink enough fluid to keep your urine pale yellow.  Keep all follow-up visits. This is important.    Contact a health care provider if:  Your weakness does not improve or gets worse.  Your weakness affects your ability to think clearly.  Your weakness affects your ability to do your normal daily activities.    Get help right away if:  You develop sudden weakness, especially on one side of your face or body.  You have chest pain.  You have trouble breathing or shortness of breath.  You have problems with your vision.  You have trouble talking or swallowing.  You have trouble standing or walking.  You are light-headed or lose consciousness.  These symptoms may be an emergency. Get help right away. Call 911.  Do not wait to see if the symptoms will go away.  Do not drive yourself to the hospital.    Summary  Weakness is a lack of strength. You may feel weak all over your body or just in one specific part of your body.  Weakness can be caused by a variety of things. In some cases, the cause may be unknown.  Rest as needed, and try to get enough sleep. Most adults need 7–8 hours of quality sleep each night.  Eat a healthy, well-balanced diet.    This information is not intended to replace advice given to you by your health care provider. Make sure you discuss any questions you have with your health care provider.    Chest Pain    Chest pain can be caused by many different conditions which may or may not be dangerous. Causes include heartburn, lung infections, heart attack, blood clot in lungs, skin infections, strain or damage to muscle, cartilage, or bones, etc. In addition to a history and physical examination, an electrocardiogram (ECG) or other lab tests may have been performed to determine the cause of your chest pain. Follow up with your primary care provider or with a cardiologist as instructed.     SEEK IMMEDIATE MEDICAL CARE IF YOU HAVE ANY OF THE FOLLOWING SYMPTOMS: worsening chest pain, coughing up blood, unexplained back/neck/jaw pain, severe abdominal pain, dizziness or lightheadedness, fainting, shortness of breath, sweaty or clammy skin, vomiting, or racing heart beat. These symptoms may represent a serious problem that is an emergency. Do not wait to see if the symptoms will go away. Get medical help right away. Call 911 and do not drive yourself to the hospital.    Shortness of breath    Shortness of breath (dyspnea) means you have trouble breathing and could indicate a medical problem. Causes include lung disease, heart disease, low amount of red blood cells (anemia), poor physical fitness, being overweight, smoking, etc. Your health care provider today may not be able to find a cause for your shortness of breath after your exam. In this case, it is important to have a follow-up exam with your primary care physician as instructed. If medicines were prescribed, take them as directed for the full length of time directed. Refrain from tobacco products.    SEEK IMMEDIATE MEDICAL CARE IF YOU HAVE ANY OF THE FOLLOWING SYMPTOMS: worsening shortness of breath, chest pain, back pain, abdominal pain, fever, coughing up blood, lightheadedness/dizziness.

## 2023-07-20 NOTE — ED PROVIDER NOTE - OBJECTIVE STATEMENT
The patient is a 89y Male with pmhx of DM2, rectal CA, CVA, atrial fibrillation on Eliquis, melanoma, hypertension, high cholesterol, prostate disease presents with generalized weakness since last night. Pt's daughter at bedside. Says that pt has been taking enema for constipation. Took 7 water enemas last night and then began to feel week. Took addition 5 water enemas today and be came even weaker. Normally ambulates with walker. Today, pt couldn't even stand due to weakness. Denies fever, cp, sob, abd pain, n/v/d, urinary symptoms, melena, BRBPR, cough, HA, extremity weakness/sensory deficits. Daughter thinks pt may be dehydrated since he doesn't drink a lot of fluid daily. Per daughter, pt has had baseline leg swelling for many years, doesn't think the swelling has gotten worse.

## 2023-07-20 NOTE — ED PROVIDER NOTE - CLINICAL SUMMARY MEDICAL DECISION MAKING FREE TEXT BOX
Scar Goetz MD (Attending Physician): The patient is a 89y Male with pmhx of DM2, rectal CA, CVA, atrial fibrillation on Eliquis, melanoma, hypertension, high cholesterol, prostate disease presents with generalized weakness since last night. DDx includes, but not limited to: ACS, CHF, UTI, electrolyte imbalance, hypoglycemia, dehydration, anemia, intracranial bleed, myopathy, PNA, viral syndrome, hypothyroidism. ekg, cxr, ct head, labs, straight cath for urine, IVF. Dispo pending w/u.

## 2023-07-20 NOTE — ED PROVIDER NOTE - CARE PLAN
1 Principal Discharge DX:	Generalized weakness  Secondary Diagnosis:	Elevated brain natriuretic peptide (BNP) level

## 2023-07-20 NOTE — ED PROVIDER NOTE - CARE PROVIDER_API CALL
Sundeep Sam  Cardiovascular Disease  [address], [place] [id number]  Phone: [phone]  Fax: [phone]  Follow Up Time: Urgent

## 2023-07-20 NOTE — ED PROVIDER NOTE - PATIENT PORTAL LINK FT
You can access the FollowMyHealth Patient Portal offered by [facility] by registering at the following website: [email or web address]. By joining [employer]’s FollowMyHealth portal, you will also be able to view your health information using other applications (apps) compatible with our system.

## 2023-07-20 NOTE — ED PROVIDER NOTE - PROGRESS NOTE DETAILS
Scar Goetz MD (Attending Physician): Pt was re-evaluated at bedside, VSS, feeling better overall. Pt has elevated BNP to 1058, but pt not endorsing any CP or SOB. Family at bedside confirms that pt has had b/l leg swelling for many years and that pt follows up with cardiologist and takes lasix for leg swelling. Family  and pt deny that leg swelling has gotten worse. Pt has 24/7 HHA. Pt says he will follow-up with cardiologist as soon as possible for echocardiogram, says he does not want to stay in hospital to get echo. Results were discussed with patient as well as return precautions and follow up plan with PCP and cardiologist. Time was taken to answer any questions that the patient had before providing them with discharge paperwork.

## 2023-07-21 LAB
CULTURE RESULTS: SIGNIFICANT CHANGE UP
SPECIMEN SOURCE: SIGNIFICANT CHANGE UP

## 2023-11-25 NOTE — ED PROVIDER NOTE - NS ED SCRIBE STATEMENT
bed, able to move all extremities                              HPI:  Catherine Mcmullen is a 81 y.o.  male with hx of urothelial carcinoma of bladder (diagnosed 2019) with chronic indwelling hebert, lap sigmoid colectomy and colostomy (April 23), Afib (rate controlled, not on DOAC), ESRD on HD. Patient presented to [facility] ED via EMS from home where he resides with his wife. Per patient - he has had been unable to tolerate food  and has had dry heaving and nausea for a few days. He had increased watery bowel movements from his osteomy over the past few days. He took imodium twice and has noticed less output. In ED - patient found to be febrile to 100.9. ED provider noted patient was hypoxic to 80s on room air. He does not have oxygen at home. Hospital Course:     26-year-old male with a history of urothelial carcinoma of the bladder diagnosed 2019, chronic indwelling Hebert, lap sigmoid colectomy and colostomy April 2023, history of A-fib not on DOAC secondary to bleeding, ESRD on HD, bilateral prosthetic hip 2006, 2008 presents to the emergency room with abdominal pain and inability to tolerate food. Patient diagnosed with severe sepsis secondary to Hebert catheter associated cystitis. Patient also noted to have possible ileus versus pseudoobstruction, GI consulted. Patient admitted to the hospital and started on broad-spectrum antibiotic treatment. Patient noted to have worsening leukocytosis likely secondary to severe C. difficile colitis. Stool sent for C. difficile on 10/23 positive suggesting evolving C. difficile infection. Patient started on p.o. vancomycin, ID consulted. In the interim urine culture from 10/21 grew out 70,000 colonies of Pseudomonas, Klebsiella. Patient underwent Hebert exchange and continue treatment with antibiotic therapy per ID recommendations. Patient continued to have persistent leukocytosis despite resolution of nausea.   CT abdomen pelvis 10/31 suggesting
Attending

## 2023-11-25 NOTE — ED ADULT NURSE NOTE - GENITOURINARY ASSESSMENT
You were admitted with an episode of syncope  Echo, EEG unremarkable    Please follow up with PCP for hospital follow up  Please increase oral intake  Please continue all the medications as your were taking them before  Holter monitor to monitor for any arrhythmias - - -

## 2024-02-05 ENCOUNTER — EMERGENCY (EMERGENCY)
Facility: HOSPITAL | Age: 89
LOS: 1 days | Discharge: HOME | End: 2024-02-05
Attending: EMERGENCY MEDICINE | Admitting: EMERGENCY MEDICINE
Payer: MEDICARE

## 2024-02-05 VITALS
HEART RATE: 61 BPM | TEMPERATURE: 98 F | RESPIRATION RATE: 16 BRPM | SYSTOLIC BLOOD PRESSURE: 130 MMHG | OXYGEN SATURATION: 98 % | DIASTOLIC BLOOD PRESSURE: 76 MMHG

## 2024-02-05 VITALS
DIASTOLIC BLOOD PRESSURE: 64 MMHG | RESPIRATION RATE: 16 BRPM | SYSTOLIC BLOOD PRESSURE: 122 MMHG | TEMPERATURE: 98 F | HEART RATE: 58 BPM | OXYGEN SATURATION: 97 %

## 2024-02-05 DIAGNOSIS — Z90.79 ACQUIRED ABSENCE OF OTHER GENITAL ORGAN(S): Chronic | ICD-10-CM

## 2024-02-05 DIAGNOSIS — D37.9 NEOPLASM OF UNCERTAIN BEHAVIOR OF DIGESTIVE ORGAN, UNSPECIFIED: Chronic | ICD-10-CM

## 2024-02-05 LAB
ALBUMIN SERPL ELPH-MCNC: 2.8 G/DL — LOW (ref 3.3–5)
ALP SERPL-CCNC: 84 U/L — SIGNIFICANT CHANGE UP (ref 40–120)
ALT FLD-CCNC: 14 U/L — SIGNIFICANT CHANGE UP (ref 12–78)
ANION GAP SERPL CALC-SCNC: 6 MMOL/L — SIGNIFICANT CHANGE UP (ref 5–17)
APTT BLD: 32.2 SEC — SIGNIFICANT CHANGE UP (ref 24.5–35.6)
AST SERPL-CCNC: 15 U/L — SIGNIFICANT CHANGE UP (ref 15–37)
BASOPHILS # BLD AUTO: 0.02 K/UL — SIGNIFICANT CHANGE UP (ref 0–0.2)
BASOPHILS NFR BLD AUTO: 0.3 % — SIGNIFICANT CHANGE UP (ref 0–2)
BILIRUB SERPL-MCNC: 0.6 MG/DL — SIGNIFICANT CHANGE UP (ref 0.2–1.2)
BUN SERPL-MCNC: 9 MG/DL — SIGNIFICANT CHANGE UP (ref 7–23)
CALCIUM SERPL-MCNC: 9.3 MG/DL — SIGNIFICANT CHANGE UP (ref 8.5–10.1)
CHLORIDE SERPL-SCNC: 99 MMOL/L — SIGNIFICANT CHANGE UP (ref 96–108)
CO2 SERPL-SCNC: 30 MMOL/L — SIGNIFICANT CHANGE UP (ref 22–31)
CREAT SERPL-MCNC: 0.97 MG/DL — SIGNIFICANT CHANGE UP (ref 0.5–1.3)
EGFR: 75 ML/MIN/1.73M2 — SIGNIFICANT CHANGE UP
EOSINOPHIL # BLD AUTO: 0.07 K/UL — SIGNIFICANT CHANGE UP (ref 0–0.5)
EOSINOPHIL NFR BLD AUTO: 0.9 % — SIGNIFICANT CHANGE UP (ref 0–6)
FLUAV AG NPH QL: SIGNIFICANT CHANGE UP
FLUBV AG NPH QL: SIGNIFICANT CHANGE UP
GLUCOSE SERPL-MCNC: 122 MG/DL — HIGH (ref 70–99)
HCT VFR BLD CALC: 34.5 % — LOW (ref 39–50)
HGB BLD-MCNC: 11.6 G/DL — LOW (ref 13–17)
IMM GRANULOCYTES NFR BLD AUTO: 0.4 % — SIGNIFICANT CHANGE UP (ref 0–0.9)
INR BLD: 1.37 RATIO — HIGH (ref 0.85–1.18)
LYMPHOCYTES # BLD AUTO: 1.01 K/UL — SIGNIFICANT CHANGE UP (ref 1–3.3)
LYMPHOCYTES # BLD AUTO: 13.6 % — SIGNIFICANT CHANGE UP (ref 13–44)
MCHC RBC-ENTMCNC: 30.3 PG — SIGNIFICANT CHANGE UP (ref 27–34)
MCHC RBC-ENTMCNC: 33.6 GM/DL — SIGNIFICANT CHANGE UP (ref 32–36)
MCV RBC AUTO: 90.1 FL — SIGNIFICANT CHANGE UP (ref 80–100)
MONOCYTES # BLD AUTO: 0.71 K/UL — SIGNIFICANT CHANGE UP (ref 0–0.9)
MONOCYTES NFR BLD AUTO: 9.6 % — SIGNIFICANT CHANGE UP (ref 2–14)
NEUTROPHILS # BLD AUTO: 5.58 K/UL — SIGNIFICANT CHANGE UP (ref 1.8–7.4)
NEUTROPHILS NFR BLD AUTO: 75.2 % — SIGNIFICANT CHANGE UP (ref 43–77)
NRBC # BLD: 0 /100 WBCS — SIGNIFICANT CHANGE UP (ref 0–0)
NT-PROBNP SERPL-SCNC: 1199 PG/ML — HIGH (ref 0–450)
PLATELET # BLD AUTO: 161 K/UL — SIGNIFICANT CHANGE UP (ref 150–400)
POTASSIUM SERPL-MCNC: 3.7 MMOL/L — SIGNIFICANT CHANGE UP (ref 3.5–5.3)
POTASSIUM SERPL-SCNC: 3.7 MMOL/L — SIGNIFICANT CHANGE UP (ref 3.5–5.3)
PROT SERPL-MCNC: 6.1 G/DL — SIGNIFICANT CHANGE UP (ref 6–8.3)
PROTHROM AB SERPL-ACNC: 15.9 SEC — HIGH (ref 9.5–13)
RBC # BLD: 3.83 M/UL — LOW (ref 4.2–5.8)
RBC # FLD: 13.6 % — SIGNIFICANT CHANGE UP (ref 10.3–14.5)
RSV RNA NPH QL NAA+NON-PROBE: SIGNIFICANT CHANGE UP
SARS-COV-2 RNA SPEC QL NAA+PROBE: SIGNIFICANT CHANGE UP
SODIUM SERPL-SCNC: 135 MMOL/L — SIGNIFICANT CHANGE UP (ref 135–145)
TROPONIN I, HIGH SENSITIVITY RESULT: 8.4 NG/L — SIGNIFICANT CHANGE UP
WBC # BLD: 7.42 K/UL — SIGNIFICANT CHANGE UP (ref 3.8–10.5)
WBC # FLD AUTO: 7.42 K/UL — SIGNIFICANT CHANGE UP (ref 3.8–10.5)

## 2024-02-05 PROCEDURE — 85025 COMPLETE CBC W/AUTO DIFF WBC: CPT

## 2024-02-05 PROCEDURE — 71045 X-RAY EXAM CHEST 1 VIEW: CPT | Mod: 26

## 2024-02-05 PROCEDURE — 93005 ELECTROCARDIOGRAM TRACING: CPT

## 2024-02-05 PROCEDURE — 36415 COLL VENOUS BLD VENIPUNCTURE: CPT

## 2024-02-05 PROCEDURE — 93010 ELECTROCARDIOGRAM REPORT: CPT

## 2024-02-05 PROCEDURE — 84484 ASSAY OF TROPONIN QUANT: CPT

## 2024-02-05 PROCEDURE — 71045 X-RAY EXAM CHEST 1 VIEW: CPT

## 2024-02-05 PROCEDURE — 70450 CT HEAD/BRAIN W/O DYE: CPT | Mod: MA

## 2024-02-05 PROCEDURE — 87637 SARSCOV2&INF A&B&RSV AMP PRB: CPT

## 2024-02-05 PROCEDURE — 85610 PROTHROMBIN TIME: CPT

## 2024-02-05 PROCEDURE — 85730 THROMBOPLASTIN TIME PARTIAL: CPT

## 2024-02-05 PROCEDURE — 70450 CT HEAD/BRAIN W/O DYE: CPT | Mod: 26,MA

## 2024-02-05 PROCEDURE — 99285 EMERGENCY DEPT VISIT HI MDM: CPT | Mod: 25

## 2024-02-05 PROCEDURE — 83880 ASSAY OF NATRIURETIC PEPTIDE: CPT

## 2024-02-05 PROCEDURE — 80053 COMPREHEN METABOLIC PANEL: CPT

## 2024-02-05 PROCEDURE — 99285 EMERGENCY DEPT VISIT HI MDM: CPT | Mod: FS

## 2024-02-05 RX ORDER — SODIUM CHLORIDE 9 MG/ML
500 INJECTION INTRAMUSCULAR; INTRAVENOUS; SUBCUTANEOUS ONCE
Refills: 0 | Status: COMPLETED | OUTPATIENT
Start: 2024-02-05 | End: 2024-02-05

## 2024-02-05 RX ADMIN — SODIUM CHLORIDE 500 MILLILITER(S): 9 INJECTION INTRAMUSCULAR; INTRAVENOUS; SUBCUTANEOUS at 18:11

## 2024-02-05 NOTE — ED PROVIDER NOTE - NSFOLLOWUPINSTRUCTIONS_ED_ALL_ED_FT
Near-Syncope  Outline of the head showing blood vessels that supply the brain.   Near-syncope is when you suddenly feel like you might pass out or faint. This may also be called presyncope. During an episode of near-syncope, you may:  Feel dizzy, weak, or light-headed. It may feel like the room is spinning.  Feel like you may vomit (nauseous).  See spots or see all white or all black.  Have cold, clammy skin.  Feel warm and sweaty.  Hear ringing in your ears.  This condition is caused by a sudden decrease in blood flow to the brain. This can result from many causes, but most of those causes are not dangerous. However, near-syncope may be a sign of a serious medical problem, so it is important to seek medical care.    Follow these instructions at home:  Medicines    Take over-the-counter and prescription medicines only as told by your doctor.  If you are taking blood pressure or heart medicine, get up slowly and spend many minutes getting ready to sit and then stand. This can help with dizziness.  Lifestyle    Do not drive, use machinery, or play sports until your doctor says it is okay.  Do not drink alcohol.  Do not smoke or use any products that contain nicotine or tobacco. If you need help quitting, ask your doctor.  Avoid hot tubs and saunas.  General instructions    Be aware of any changes in your symptoms.  Talk with your doctor about your symptoms. You may need to have testing to help find the cause.  If you start to feel like you might pass out, sit or lie down right away. If sitting, lower your head down between your legs. If lying down, raise (elevate) your feet above the level of your heart.  Breathe deeply and steadily. Wait until all of the symptoms are gone.  Have someone stay with you until you feel better.  Drink enough fluid to keep your pee (urine) pale yellow.  Avoid standing for a long time. If you must stand for a long time, do movements such as:  Moving your legs.  Crossing your legs.  Flexing and stretching your leg muscles.  Squatting.  Keep all follow-up visits.  Contact a doctor if:  You continue to have episodes of near fainting.  Get help right away if:  You pass out or faint.  You have any of these symptoms:  Fast or uneven heartbeats (palpitations).  Pain in your chest, belly, or back.  Shortness of breath.  You have a seizure.  You have a very bad headache.  You are confused.  You have trouble seeing.  You are very weak.  You have trouble walking.  You are bleeding from your mouth or butt.  You have black or tarry poop (stool).  These symptoms may be an emergency. Get help right away. Call your local emergency services (911 in the [place]  Do not wait to see if the symptoms will go away.  Do not drive yourself to the hospital.  Summary  Near-syncope is when you suddenly feel like you might pass out or faint.  This condition is caused by a sudden decrease in blood flow to the brain.  Near-syncope may be a sign of a serious medical problem, so it is important to seek medical care.  If you start to feel like you might pass out, sit or lie down right away. If sitting, lower your head down between your legs. If lying down, raise (elevate) your feet above the level of your heart.  Talk with your doctor about your symptoms. You may need to have testing to help find the cause.  This information is not intended to replace advice given to you by your health care provider. Make sure you discuss any questions you have with your health care provider.    Document Revised: 04/28/2022 Document Reviewed: 04/28/2022  Elsevier Patient Education © 2023 [employer]

## 2024-02-05 NOTE — ED ADULT NURSE NOTE - OBJECTIVE STATEMENT
A/ox4 patient BIBEMS from home for near syncopal episode today while at the dinner table. Per aid, patient looked up and became cold, diaphoretic, and then he became unresponsive with his eyes rolling back. No cp/sob, no abd pain. No n/v/d.

## 2024-02-05 NOTE — ED PROVIDER NOTE - PROGRESS NOTE DETAILS
Discussed results with daughter at bedside.  Orthostatic negative.  No signs of heart strain.  Patient declines admission for further cardiac monitoring and cardiology.  Patient will follow-up with his own cardiologist outpatient.

## 2024-02-05 NOTE — ED PROVIDER NOTE - CARE PROVIDER_API CALL
DIDIER MANCERA  [address]  [place] [address]  Phone: [phone]  Fax: [phone]  Follow Up Time: 1-3 Days

## 2024-02-05 NOTE — ED PROVIDER NOTE - MUSCULOSKELETAL, MLM
Spine appears normal, range of motion is not limited, no muscle or joint tenderness, chronic pitting edema BLE, nontender

## 2024-02-05 NOTE — ED PROVIDER NOTE - CLINICAL SUMMARY MEDICAL DECISION MAKING FREE TEXT BOX
89-year-old male with significant past medical history for urinary retention requiring self-catheterization, TIA, asthma, rectal CA, hypertension presents to the ED with caretakers at bedside with complaints of near syncope after a meal.  Patient states that he felt weak in his head felt heavy.  Patient denies any headache, chest pain, palpitations, fevers.  Patient declines any admission for further evaluation and treatment.  Cardiac workup rule out ACS, CT head rule out intracranial pathology, labs rule out electrolyte abnormality.

## 2024-02-05 NOTE — ED PROVIDER NOTE - PATIENT PORTAL LINK FT
You can access the FollowMyHealth Patient Portal offered by [facility] by registering at the following website: [email or web address]. By joining [employer]’s FollowMyHealth portal, you will also be able to view your health information using other applications (apps) compatible with our system.

## 2024-02-05 NOTE — ED ADULT NURSE REASSESSMENT NOTE - NS ED NURSE REASSESS COMMENT FT1
1930: Pt received from previous RN. Alert and oriented x3, forgetful at times. No acute distress noted at this time. Family at bedside. Pt pending CT results. Safety and comfort maintained at all times. Respirations even and unlabored. Plan of care reviewed with family at bedside, verbalized understanding. Will continue to monitor.

## 2024-02-05 NOTE — ED PROVIDER NOTE - OBJECTIVE STATEMENT
89-year-old male with history of A-fib on Eliquis, GERD, hypertension, hyperlipidemia, CVA, diabetes, rectal CA brought in by ambulance from home status post syncopal episode today.  Patient states that he had dinner around 4 PM today and was sitting at the table when he started feeling nauseous, lightheaded with sweats and passed out.  Home health aide at bedside states that she witnessed the incident and patient did not fall down or hit his head.  States that patient head went back and was out of it for a few minutes.  States that patient BP was in the 80s systolic and heart rate in the 40s upon EMS arrival as per home health aide and family at bedside.  States that patient started feeling better and has blood pressure improved after IV fluids from EMS.  Patient states that he feels much better now and wants to go home, states that he feels tired only. Denies any CP or SOB prior to or after syncope. Denies vomiting, fever, abdominal pain. States that patient is on chronic Vantin for prophylaxis.  pcp; Dr. Abdelrahman Cervantes  cards: Dr. Jerrell Gaytan

## 2024-03-08 NOTE — DISCHARGE NOTE PROVIDER - NSDCHC_MEDRECSTATUS_GEN_ALL_CORE
Admission Reconciliation is Completed  Discharge Reconciliation is Not Complete Admission Reconciliation is Completed  Discharge Reconciliation is Completed Car

## 2024-06-27 ENCOUNTER — APPOINTMENT (OUTPATIENT)
Dept: ORTHOPEDIC SURGERY | Facility: CLINIC | Age: 89
End: 2024-06-27

## 2024-07-02 ENCOUNTER — APPOINTMENT (OUTPATIENT)
Dept: ORTHOPEDIC SURGERY | Facility: CLINIC | Age: 89
End: 2024-07-02

## 2024-07-02 VITALS — HEIGHT: 73 IN | WEIGHT: 190 LBS | BODY MASS INDEX: 25.18 KG/M2

## 2024-07-02 DIAGNOSIS — E11.9 TYPE 2 DIABETES MELLITUS W/OUT COMPLICATIONS: ICD-10-CM

## 2024-07-02 DIAGNOSIS — I51.9 HEART DISEASE, UNSPECIFIED: ICD-10-CM

## 2024-07-02 DIAGNOSIS — M48.00 SPINAL STENOSIS, SITE UNSPECIFIED: ICD-10-CM

## 2024-07-02 DIAGNOSIS — C80.1 MALIGNANT (PRIMARY) NEOPLASM, UNSPECIFIED: ICD-10-CM

## 2024-07-02 DIAGNOSIS — M19.90 UNSPECIFIED OSTEOARTHRITIS, UNSPECIFIED SITE: ICD-10-CM

## 2024-07-02 DIAGNOSIS — G89.29 SACROCOCCYGEAL DISORDERS, NOT ELSEWHERE CLASSIFIED: ICD-10-CM

## 2024-07-02 DIAGNOSIS — G57.01 LESION OF SCIATIC NERVE, RIGHT LOWER LIMB: ICD-10-CM

## 2024-07-02 DIAGNOSIS — M53.3 SACROCOCCYGEAL DISORDERS, NOT ELSEWHERE CLASSIFIED: ICD-10-CM

## 2024-07-02 DIAGNOSIS — I10 ESSENTIAL (PRIMARY) HYPERTENSION: ICD-10-CM

## 2024-07-02 PROCEDURE — 99203 OFFICE O/P NEW LOW 30 MIN: CPT | Mod: 25

## 2024-07-02 PROCEDURE — 73503 X-RAY EXAM HIP UNI 4/> VIEWS: CPT | Mod: RT

## 2024-07-02 PROCEDURE — 20552 NJX 1/MLT TRIGGER POINT 1/2: CPT | Mod: RT

## 2024-07-10 RX ORDER — GABAPENTIN 100 MG/1
100 CAPSULE ORAL
Qty: 60 | Refills: 0 | Status: ACTIVE | COMMUNITY
Start: 2024-07-10 | End: 1900-01-01

## 2024-07-11 ENCOUNTER — APPOINTMENT (OUTPATIENT)
Dept: ORTHOPEDIC SURGERY | Facility: CLINIC | Age: 89
End: 2024-07-11

## 2024-09-07 ENCOUNTER — EMERGENCY (EMERGENCY)
Facility: HOSPITAL | Age: 89
LOS: 1 days | Discharge: LEFT BEFORE DISCHARGE | End: 2024-09-07
Attending: STUDENT IN AN ORGANIZED HEALTH CARE EDUCATION/TRAINING PROGRAM | Admitting: STUDENT IN AN ORGANIZED HEALTH CARE EDUCATION/TRAINING PROGRAM
Payer: MEDICARE

## 2024-09-07 VITALS
RESPIRATION RATE: 18 BRPM | HEART RATE: 76 BPM | DIASTOLIC BLOOD PRESSURE: 50 MMHG | HEIGHT: 74 IN | TEMPERATURE: 98 F | SYSTOLIC BLOOD PRESSURE: 107 MMHG | WEIGHT: 179.9 LBS | OXYGEN SATURATION: 95 %

## 2024-09-07 VITALS — HEART RATE: 80 BPM | SYSTOLIC BLOOD PRESSURE: 135 MMHG | DIASTOLIC BLOOD PRESSURE: 68 MMHG

## 2024-09-07 DIAGNOSIS — D37.9 NEOPLASM OF UNCERTAIN BEHAVIOR OF DIGESTIVE ORGAN, UNSPECIFIED: Chronic | ICD-10-CM

## 2024-09-07 DIAGNOSIS — Z90.79 ACQUIRED ABSENCE OF OTHER GENITAL ORGAN(S): Chronic | ICD-10-CM

## 2024-09-07 LAB
ALBUMIN SERPL ELPH-MCNC: 2.7 G/DL — LOW (ref 3.3–5)
ALP SERPL-CCNC: 65 U/L — SIGNIFICANT CHANGE UP (ref 40–120)
ALT FLD-CCNC: 22 U/L — SIGNIFICANT CHANGE UP (ref 12–78)
ANION GAP SERPL CALC-SCNC: 10 MMOL/L — SIGNIFICANT CHANGE UP (ref 5–17)
ANION GAP SERPL CALC-SCNC: 10 MMOL/L — SIGNIFICANT CHANGE UP (ref 5–17)
AST SERPL-CCNC: 23 U/L — SIGNIFICANT CHANGE UP (ref 15–37)
BASOPHILS # BLD AUTO: 0.02 K/UL — SIGNIFICANT CHANGE UP (ref 0–0.2)
BASOPHILS NFR BLD AUTO: 0.3 % — SIGNIFICANT CHANGE UP (ref 0–2)
BILIRUB SERPL-MCNC: 0.7 MG/DL — SIGNIFICANT CHANGE UP (ref 0.2–1.2)
BUN SERPL-MCNC: 11 MG/DL — SIGNIFICANT CHANGE UP (ref 7–23)
BUN SERPL-MCNC: 11 MG/DL — SIGNIFICANT CHANGE UP (ref 7–23)
CALCIUM SERPL-MCNC: 8.8 MG/DL — SIGNIFICANT CHANGE UP (ref 8.5–10.1)
CALCIUM SERPL-MCNC: 8.8 MG/DL — SIGNIFICANT CHANGE UP (ref 8.5–10.1)
CHLORIDE SERPL-SCNC: 89 MMOL/L — LOW (ref 96–108)
CHLORIDE SERPL-SCNC: 90 MMOL/L — LOW (ref 96–108)
CO2 SERPL-SCNC: 25 MMOL/L — SIGNIFICANT CHANGE UP (ref 22–31)
CO2 SERPL-SCNC: 25 MMOL/L — SIGNIFICANT CHANGE UP (ref 22–31)
CREAT SERPL-MCNC: 0.72 MG/DL — SIGNIFICANT CHANGE UP (ref 0.5–1.3)
CREAT SERPL-MCNC: 0.79 MG/DL — SIGNIFICANT CHANGE UP (ref 0.5–1.3)
EGFR: 85 ML/MIN/1.73M2 — SIGNIFICANT CHANGE UP
EGFR: 87 ML/MIN/1.73M2 — SIGNIFICANT CHANGE UP
EOSINOPHIL # BLD AUTO: 0.08 K/UL — SIGNIFICANT CHANGE UP (ref 0–0.5)
EOSINOPHIL NFR BLD AUTO: 1.4 % — SIGNIFICANT CHANGE UP (ref 0–6)
GLUCOSE SERPL-MCNC: 122 MG/DL — HIGH (ref 70–99)
GLUCOSE SERPL-MCNC: 124 MG/DL — HIGH (ref 70–99)
HCT VFR BLD CALC: 28.4 % — LOW (ref 39–50)
HGB BLD-MCNC: 10.1 G/DL — LOW (ref 13–17)
IMM GRANULOCYTES NFR BLD AUTO: 0.3 % — SIGNIFICANT CHANGE UP (ref 0–0.9)
LYMPHOCYTES # BLD AUTO: 0.68 K/UL — LOW (ref 1–3.3)
LYMPHOCYTES # BLD AUTO: 11.7 % — LOW (ref 13–44)
MAGNESIUM SERPL-MCNC: 1.2 MG/DL — LOW (ref 1.6–2.6)
MCHC RBC-ENTMCNC: 32.1 PG — SIGNIFICANT CHANGE UP (ref 27–34)
MCHC RBC-ENTMCNC: 35.6 GM/DL — SIGNIFICANT CHANGE UP (ref 32–36)
MCV RBC AUTO: 90.2 FL — SIGNIFICANT CHANGE UP (ref 80–100)
MONOCYTES # BLD AUTO: 0.43 K/UL — SIGNIFICANT CHANGE UP (ref 0–0.9)
MONOCYTES NFR BLD AUTO: 7.4 % — SIGNIFICANT CHANGE UP (ref 2–14)
NEUTROPHILS # BLD AUTO: 4.57 K/UL — SIGNIFICANT CHANGE UP (ref 1.8–7.4)
NEUTROPHILS NFR BLD AUTO: 78.9 % — HIGH (ref 43–77)
NRBC # BLD: 0 /100 WBCS — SIGNIFICANT CHANGE UP (ref 0–0)
NT-PROBNP SERPL-SCNC: 2656 PG/ML — HIGH (ref 0–450)
PLATELET # BLD AUTO: 146 K/UL — LOW (ref 150–400)
POTASSIUM SERPL-MCNC: 4.2 MMOL/L — SIGNIFICANT CHANGE UP (ref 3.5–5.3)
POTASSIUM SERPL-MCNC: 4.4 MMOL/L — SIGNIFICANT CHANGE UP (ref 3.5–5.3)
POTASSIUM SERPL-SCNC: 4.2 MMOL/L — SIGNIFICANT CHANGE UP (ref 3.5–5.3)
POTASSIUM SERPL-SCNC: 4.4 MMOL/L — SIGNIFICANT CHANGE UP (ref 3.5–5.3)
PROT SERPL-MCNC: 5.6 G/DL — LOW (ref 6–8.3)
RBC # BLD: 3.15 M/UL — LOW (ref 4.2–5.8)
RBC # FLD: 13.3 % — SIGNIFICANT CHANGE UP (ref 10.3–14.5)
SODIUM SERPL-SCNC: 124 MMOL/L — LOW (ref 135–145)
SODIUM SERPL-SCNC: 125 MMOL/L — LOW (ref 135–145)
TROPONIN I, HIGH SENSITIVITY RESULT: 9.3 NG/L — SIGNIFICANT CHANGE UP
TROPONIN I, HIGH SENSITIVITY RESULT: 9.7 NG/L — SIGNIFICANT CHANGE UP
WBC # BLD: 5.8 K/UL — SIGNIFICANT CHANGE UP (ref 3.8–10.5)
WBC # FLD AUTO: 5.8 K/UL — SIGNIFICANT CHANGE UP (ref 3.8–10.5)

## 2024-09-07 PROCEDURE — 83880 ASSAY OF NATRIURETIC PEPTIDE: CPT

## 2024-09-07 PROCEDURE — 80048 BASIC METABOLIC PNL TOTAL CA: CPT

## 2024-09-07 PROCEDURE — 96374 THER/PROPH/DIAG INJ IV PUSH: CPT

## 2024-09-07 PROCEDURE — 99285 EMERGENCY DEPT VISIT HI MDM: CPT | Mod: 25

## 2024-09-07 PROCEDURE — 85025 COMPLETE CBC W/AUTO DIFF WBC: CPT

## 2024-09-07 PROCEDURE — 36415 COLL VENOUS BLD VENIPUNCTURE: CPT

## 2024-09-07 PROCEDURE — 71045 X-RAY EXAM CHEST 1 VIEW: CPT

## 2024-09-07 PROCEDURE — 71045 X-RAY EXAM CHEST 1 VIEW: CPT | Mod: 26

## 2024-09-07 PROCEDURE — 80053 COMPREHEN METABOLIC PANEL: CPT

## 2024-09-07 PROCEDURE — 93005 ELECTROCARDIOGRAM TRACING: CPT

## 2024-09-07 PROCEDURE — 83735 ASSAY OF MAGNESIUM: CPT

## 2024-09-07 PROCEDURE — 93010 ELECTROCARDIOGRAM REPORT: CPT

## 2024-09-07 PROCEDURE — 99284 EMERGENCY DEPT VISIT MOD MDM: CPT

## 2024-09-07 PROCEDURE — 84484 ASSAY OF TROPONIN QUANT: CPT

## 2024-09-07 RX ORDER — SODIUM CHLORIDE 9 MG/ML
1000 INJECTION INTRAMUSCULAR; INTRAVENOUS; SUBCUTANEOUS ONCE
Refills: 0 | Status: COMPLETED | OUTPATIENT
Start: 2024-09-07 | End: 2024-09-07

## 2024-09-07 RX ADMIN — Medication 100 GRAM(S): at 12:26

## 2024-09-07 RX ADMIN — SODIUM CHLORIDE 1000 MILLILITER(S): 9 INJECTION INTRAMUSCULAR; INTRAVENOUS; SUBCUTANEOUS at 09:32

## 2024-09-07 NOTE — ED ADULT NURSE NOTE - ED STAT RN HANDOFF DETAILS
patient given discharge papers with aide and family at bedside.  patient safely transported off unit.

## 2024-09-07 NOTE — ED PROVIDER NOTE - PATIENT PORTAL LINK FT
You can access the FollowMyHealth Patient Portal offered by [facility] by registering at the following website: [email or web address]. By joining [employer]’s FollowMyHealth portal, you will also be able to view your health information using other applications (apps) compatible with our system.

## 2024-09-07 NOTE — ED PROVIDER NOTE - PROGRESS NOTE DETAILS
The patient is oriented to person, place, and time, has the capacity to make decisions regarding the medical care offered. The patient speaks coherently and exhibits no evidence of having an altered level of consciousness or alcohol or drug intoxication to a point that would impair judgment. They respond knowingly to questions about recommended treatment and alternate treatments including no further testing or treatment; participate in diagnostic and treatment decisions by means of rational thought processes; and understand the items of minimum basic medical treatment information with respect to that treatment (the nature and seriousness of the illness, the nature of the treatment, the probable degree and duration of any benefits and risks of any medical intervention that is being recommended, and the consequences of lack of treatment, and the nature, risks, and benefits of any reasonable alternatives).    I have reviewed the relevant issues with the patient. They are aware of the suspected diagnosis suggested by screening exam, lab work, chemistries, based upon the initiated medical screening exam. The patient acknowledges understanding of the reasons for recommendations regarding medical treatment, medical testing, and further monitoring and observation. The recommended medical care being refused has been discussed with the patient and is admission to hospital for nephrology eval. The risks of refusing recommended care that were disclosed and acknowledged by the patient are loss of current lifestyle, permanent mental impairment, and death.    The patient understands the relevant information of the nature of their medical condition, as well as the risks, benefits, and treatment alternatives (including non-treatment), consequences of refusing care, and can competently communicate a rational explanation about their choice of care options.    The patient understands they are welcome to return to the hospital at any time to receive the recommended care or any other care at any time, regardless of their ability to pay for such care. pt hyponatremic after IVF rehydration. I strongly recommended admission. Patient needs nephro work up, urine lytes, monitoring. he feels well. vitals normal. labs otherwise normal. low mag replaced in ED. pt adamantly refusing hospital admission.

## 2024-09-07 NOTE — ED ADULT NURSE NOTE - OBJECTIVE STATEMENT
patient presents to ED from home after a syncopal episode.  patient has 24 hour live in aides and was brought in after they witnessed him pass out on the toilet.  patient uses enemas daily to aide in having a bowel movement and this morning he was straining and passed out.  as per aides patient did not fall off the toilet and did not hit his head.

## 2024-09-07 NOTE — ED PROVIDER NOTE - CARDIAC, MLM
Normal rate, regular rhythm.  Heart sounds S1, S2.  No murmurs, rubs or gallops.  Positive by radial pulses.  All 4 extremities are warm and well-perfused.

## 2024-09-07 NOTE — ED PROVIDER NOTE - CLINICAL SUMMARY MEDICAL DECISION MAKING FREE TEXT BOX
Elderly male presents with a brief syncopal episode while straining on the toilet.  Hypotensive prior to ED arrival but out vitals normalized.  He is well-appearing with no complaints and requesting to go home.  Twelve-lead shows a V paced rhythm.  Exam is unremarkable.  all 4 extremities warm well-perfused.  Lungs clear to auscultation.  Heart sounds normal.  No respiratory distress.  Will evaluate syncope with cardiac enzymes chest x-ray labs IV fluid and reassessment.

## 2024-09-07 NOTE — ED ADULT NURSE NOTE - NSFALLHARMRISKINTERV_ED_ALL_ED

## 2024-09-07 NOTE — ED ADULT NURSE NOTE - CHIEF COMPLAINT QUOTE
Patient had given himself two enemas, was pushing on toilet and had syncopal episode. Hypotensive when found by EMS. Currently taking eliquis

## 2024-09-07 NOTE — ED ADULT TRIAGE NOTE - CHIEF COMPLAINT QUOTE
Patient had given himself two enemas, was pushing on toilet and had syncopal episode. Hypotensive when found by EMS. Patient had given himself two enemas, was pushing on toilet and had syncopal episode. Hypotensive when found by EMS. Currently taking eliquis

## 2024-09-07 NOTE — ED PROVIDER NOTE - NSFOLLOWUPINSTRUCTIONS_ED_ALL_ED_FT
Seek immediate medical assistance for any new or worsening symptoms. If you have issues obtaining follow up, please call: [phone] or [phone]  to obtain a doctor or specialist who takes your insurance in your area.    We will help arrange nephrology follow-up for you on Monday when staff comes in to help you with appointment.    Please follow-up with your primary care doctor.    Please attempt to make an appointment with a nephrologist on your own.    Please return to the hospital if you change your mind as I do recommend medical admission for your condition and there is a risk of death/seizures/coma if not fully diagnosed and treated.

## 2024-09-07 NOTE — ED ADULT NURSE REASSESSMENT NOTE - NS ED NURSE REASSESS COMMENT FT1
RN and MD spoke with patient about the importance of staying with a Sodium of 124.  patient told about the risks of leaving.  patient does not want to be admitted.  patient asked to relay information that doctor told him back to the nurse.  patient states " the doctor told me it is dangerous to leave, but I am not staying in the hospital, I am going home".  family at bedside with aides all aware of this information and told RN " we can not make him stay if he wants to go home"  family told side effects of low sodium to watch for and to call 911 immediately if any symptoms occur.

## 2024-09-07 NOTE — ED PROVIDER NOTE - OBJECTIVE STATEMENT
89-year-old male presents the ER with an episode of unresponsiveness.  His aide reported he was sitting on the toilet bowl straining to have a bowel movement when he passed out briefly.  He woke up shortly thereafter and returned to his normal baseline mental status.  EMS arrived gave him IV fluids and brought him to the ER.  Patient said that he had no symptoms prior to the episode.  Denied chest pain palpitations shortness of breath headache or abdominal pain.  Because of his history of rectal cancer he is frequently constipated and uses 2 enemas and a suppository every morning have a bowel movement because he says that he does not have the strength to push.  He also self catheterizes multiple times a day.  He has an Abbott pacemaker.

## 2024-09-07 NOTE — ED PROVIDER NOTE - WR ORDER DATE AND TIME 1
Patient seen and evaluated.  Admitted to Oncology for acute GI bleed with GI following.  See full H&P.    Kevin Vick MD  [facility]     07-Sep-2024 11:30

## 2024-12-24 ENCOUNTER — EMERGENCY (EMERGENCY)
Facility: HOSPITAL | Age: 89
LOS: 0 days | Discharge: HOME | End: 2024-12-24
Attending: EMERGENCY MEDICINE
Payer: MEDICARE

## 2024-12-24 VITALS
HEIGHT: 70 IN | OXYGEN SATURATION: 100 % | DIASTOLIC BLOOD PRESSURE: 97 MMHG | HEART RATE: 70 BPM | RESPIRATION RATE: 18 BRPM | WEIGHT: 173.06 LBS | TEMPERATURE: 99 F | SYSTOLIC BLOOD PRESSURE: 180 MMHG

## 2024-12-24 VITALS
TEMPERATURE: 98 F | HEART RATE: 78 BPM | DIASTOLIC BLOOD PRESSURE: 84 MMHG | SYSTOLIC BLOOD PRESSURE: 156 MMHG | RESPIRATION RATE: 18 BRPM | OXYGEN SATURATION: 100 %

## 2024-12-24 DIAGNOSIS — R06.02 SHORTNESS OF BREATH: ICD-10-CM

## 2024-12-24 DIAGNOSIS — I50.9 HEART FAILURE, UNSPECIFIED: ICD-10-CM

## 2024-12-24 DIAGNOSIS — Z90.79 ACQUIRED ABSENCE OF OTHER GENITAL ORGAN(S): Chronic | ICD-10-CM

## 2024-12-24 DIAGNOSIS — R60.0 LOCALIZED EDEMA: ICD-10-CM

## 2024-12-24 DIAGNOSIS — Z87.09 PERSONAL HISTORY OF OTHER DISEASES OF THE RESPIRATORY SYSTEM: ICD-10-CM

## 2024-12-24 DIAGNOSIS — Z86.73 PERSONAL HISTORY OF TRANSIENT ISCHEMIC ATTACK (TIA), AND CEREBRAL INFARCTION WITHOUT RESIDUAL DEFICITS: ICD-10-CM

## 2024-12-24 DIAGNOSIS — I11.0 HYPERTENSIVE HEART DISEASE WITH HEART FAILURE: ICD-10-CM

## 2024-12-24 DIAGNOSIS — D37.9 NEOPLASM OF UNCERTAIN BEHAVIOR OF DIGESTIVE ORGAN, UNSPECIFIED: Chronic | ICD-10-CM

## 2024-12-24 DIAGNOSIS — Z66 DO NOT RESUSCITATE: ICD-10-CM

## 2024-12-24 LAB
ALBUMIN SERPL ELPH-MCNC: 2.7 G/DL — LOW (ref 3.3–5)
ALP SERPL-CCNC: 65 U/L — SIGNIFICANT CHANGE UP (ref 40–120)
ALT FLD-CCNC: 8 U/L — LOW (ref 12–78)
ANION GAP SERPL CALC-SCNC: 4 MMOL/L — LOW (ref 5–17)
APPEARANCE UR: CLEAR — SIGNIFICANT CHANGE UP
APTT BLD: 34 SEC — SIGNIFICANT CHANGE UP (ref 24.5–35.6)
AST SERPL-CCNC: 14 U/L — LOW (ref 15–37)
BACTERIA # UR AUTO: NEGATIVE /HPF — SIGNIFICANT CHANGE UP
BASOPHILS # BLD AUTO: 0.03 K/UL — SIGNIFICANT CHANGE UP (ref 0–0.2)
BASOPHILS NFR BLD AUTO: 0.5 % — SIGNIFICANT CHANGE UP (ref 0–2)
BILIRUB SERPL-MCNC: 0.6 MG/DL — SIGNIFICANT CHANGE UP (ref 0.2–1.2)
BILIRUB UR-MCNC: NEGATIVE — SIGNIFICANT CHANGE UP
BUN SERPL-MCNC: 18 MG/DL — SIGNIFICANT CHANGE UP (ref 7–23)
CALCIUM SERPL-MCNC: 9.3 MG/DL — SIGNIFICANT CHANGE UP (ref 8.5–10.1)
CAST: 0 /LPF — SIGNIFICANT CHANGE UP (ref 0–4)
CHLORIDE SERPL-SCNC: 96 MMOL/L — SIGNIFICANT CHANGE UP (ref 96–108)
CO2 SERPL-SCNC: 31 MMOL/L — SIGNIFICANT CHANGE UP (ref 22–31)
COLOR SPEC: YELLOW — SIGNIFICANT CHANGE UP
CREAT SERPL-MCNC: 0.89 MG/DL — SIGNIFICANT CHANGE UP (ref 0.5–1.3)
DIFF PNL FLD: ABNORMAL
EGFR: 82 ML/MIN/1.73M2 — SIGNIFICANT CHANGE UP
EOSINOPHIL # BLD AUTO: 0.06 K/UL — SIGNIFICANT CHANGE UP (ref 0–0.5)
EOSINOPHIL NFR BLD AUTO: 1.1 % — SIGNIFICANT CHANGE UP (ref 0–6)
FLUAV AG NPH QL: SIGNIFICANT CHANGE UP
FLUBV AG NPH QL: SIGNIFICANT CHANGE UP
GLUCOSE SERPL-MCNC: 122 MG/DL — HIGH (ref 70–99)
GLUCOSE UR QL: NEGATIVE MG/DL — SIGNIFICANT CHANGE UP
HCT VFR BLD CALC: 41.7 % — SIGNIFICANT CHANGE UP (ref 39–50)
HGB BLD-MCNC: 13.4 G/DL — SIGNIFICANT CHANGE UP (ref 13–17)
IMM GRANULOCYTES NFR BLD AUTO: 0.4 % — SIGNIFICANT CHANGE UP (ref 0–0.9)
INR BLD: 1.1 RATIO — SIGNIFICANT CHANGE UP (ref 0.85–1.16)
KETONES UR-MCNC: NEGATIVE MG/DL — SIGNIFICANT CHANGE UP
LEUKOCYTE ESTERASE UR-ACNC: ABNORMAL
LYMPHOCYTES # BLD AUTO: 1.4 K/UL — SIGNIFICANT CHANGE UP (ref 1–3.3)
LYMPHOCYTES # BLD AUTO: 25.5 % — SIGNIFICANT CHANGE UP (ref 13–44)
MCHC RBC-ENTMCNC: 29.4 PG — SIGNIFICANT CHANGE UP (ref 27–34)
MCHC RBC-ENTMCNC: 32.1 G/DL — SIGNIFICANT CHANGE UP (ref 32–36)
MCV RBC AUTO: 91.4 FL — SIGNIFICANT CHANGE UP (ref 80–100)
MONOCYTES # BLD AUTO: 0.47 K/UL — SIGNIFICANT CHANGE UP (ref 0–0.9)
MONOCYTES NFR BLD AUTO: 8.6 % — SIGNIFICANT CHANGE UP (ref 2–14)
NEUTROPHILS # BLD AUTO: 3.51 K/UL — SIGNIFICANT CHANGE UP (ref 1.8–7.4)
NEUTROPHILS NFR BLD AUTO: 63.9 % — SIGNIFICANT CHANGE UP (ref 43–77)
NITRITE UR-MCNC: NEGATIVE — SIGNIFICANT CHANGE UP
NT-PROBNP SERPL-SCNC: 3092 PG/ML — HIGH (ref 0–450)
PH UR: 5.5 — SIGNIFICANT CHANGE UP (ref 5–8)
PLATELET # BLD AUTO: 171 K/UL — SIGNIFICANT CHANGE UP (ref 150–400)
POTASSIUM SERPL-MCNC: 3.7 MMOL/L — SIGNIFICANT CHANGE UP (ref 3.5–5.3)
POTASSIUM SERPL-SCNC: 3.7 MMOL/L — SIGNIFICANT CHANGE UP (ref 3.5–5.3)
PROT SERPL-MCNC: 6.4 GM/DL — SIGNIFICANT CHANGE UP (ref 6–8.3)
PROT UR-MCNC: NEGATIVE MG/DL — SIGNIFICANT CHANGE UP
PROTHROM AB SERPL-ACNC: 13 SEC — SIGNIFICANT CHANGE UP (ref 9.9–13.4)
RBC # BLD: 4.56 M/UL — SIGNIFICANT CHANGE UP (ref 4.2–5.8)
RBC # FLD: 15 % — HIGH (ref 10.3–14.5)
RBC CASTS # UR COMP ASSIST: 104 /HPF — HIGH (ref 0–4)
RSV RNA NPH QL NAA+NON-PROBE: SIGNIFICANT CHANGE UP
SARS-COV-2 RNA SPEC QL NAA+PROBE: SIGNIFICANT CHANGE UP
SODIUM SERPL-SCNC: 131 MMOL/L — LOW (ref 135–145)
SP GR SPEC: 1.01 — SIGNIFICANT CHANGE UP (ref 1–1.03)
SQUAMOUS # UR AUTO: 1 /HPF — SIGNIFICANT CHANGE UP (ref 0–5)
TROPONIN I, HIGH SENSITIVITY RESULT: 17.19 NG/L — SIGNIFICANT CHANGE UP
UROBILINOGEN FLD QL: 0.2 MG/DL — SIGNIFICANT CHANGE UP (ref 0.2–1)
WBC # BLD: 5.49 K/UL — SIGNIFICANT CHANGE UP (ref 3.8–10.5)
WBC # FLD AUTO: 5.49 K/UL — SIGNIFICANT CHANGE UP (ref 3.8–10.5)
WBC UR QL: 1 /HPF — SIGNIFICANT CHANGE UP (ref 0–5)

## 2024-12-24 PROCEDURE — 83880 ASSAY OF NATRIURETIC PEPTIDE: CPT

## 2024-12-24 PROCEDURE — 85025 COMPLETE CBC W/AUTO DIFF WBC: CPT

## 2024-12-24 PROCEDURE — 80053 COMPREHEN METABOLIC PANEL: CPT

## 2024-12-24 PROCEDURE — 71045 X-RAY EXAM CHEST 1 VIEW: CPT | Mod: 26

## 2024-12-24 PROCEDURE — 85610 PROTHROMBIN TIME: CPT

## 2024-12-24 PROCEDURE — 99284 EMERGENCY DEPT VISIT MOD MDM: CPT | Mod: FS

## 2024-12-24 PROCEDURE — 99285 EMERGENCY DEPT VISIT HI MDM: CPT | Mod: 25

## 2024-12-24 PROCEDURE — 93010 ELECTROCARDIOGRAM REPORT: CPT

## 2024-12-24 PROCEDURE — 93005 ELECTROCARDIOGRAM TRACING: CPT

## 2024-12-24 PROCEDURE — 71045 X-RAY EXAM CHEST 1 VIEW: CPT

## 2024-12-24 PROCEDURE — 85730 THROMBOPLASTIN TIME PARTIAL: CPT

## 2024-12-24 PROCEDURE — 96374 THER/PROPH/DIAG INJ IV PUSH: CPT

## 2024-12-24 PROCEDURE — 81001 URINALYSIS AUTO W/SCOPE: CPT

## 2024-12-24 PROCEDURE — 84484 ASSAY OF TROPONIN QUANT: CPT

## 2024-12-24 PROCEDURE — 96376 TX/PRO/DX INJ SAME DRUG ADON: CPT

## 2024-12-24 PROCEDURE — 36415 COLL VENOUS BLD VENIPUNCTURE: CPT

## 2024-12-24 PROCEDURE — 0241U: CPT

## 2024-12-24 RX ORDER — FUROSEMIDE 40 MG/1
40 TABLET ORAL ONCE
Refills: 0 | Status: COMPLETED | OUTPATIENT
Start: 2024-12-24 | End: 2024-12-24

## 2024-12-24 RX ADMIN — FUROSEMIDE 40 MILLIGRAM(S): 40 TABLET ORAL at 14:14

## 2024-12-24 RX ADMIN — FUROSEMIDE 40 MILLIGRAM(S): 40 TABLET ORAL at 16:38

## 2024-12-24 NOTE — ED PROVIDER NOTE - NSFOLLOWUPINSTRUCTIONS_ED_ALL_ED_FT
Pleural Effusion    follow up friday withyour doctor   return for sob, chest pain or dyspnea    WHAT YOU NEED TO KNOW:    What is pleural effusion? Pleural effusion is fluid buildup in the space between the layers of the pleura. The pleura is a thin piece of tissue with 2 layers. One layer rests directly on the lungs. The other rests on the chest wall. There is normally a small amount of fluid between these layers. This fluid helps your lungs move easily when you breathe.  The Lungs    What causes pleural effusion?    Heart and lung problems, such as heart failure or a pulmonary embolism (blockage of a blood vessel in the lungs)    Lung infections such as bacterial pneumonia or tuberculosis (TB)    Inflammation of the pleura (pleurisy), a trapped lung    Problems with organs in your chest or abdomen, such as liver cancer, pancreatitis, or an injury  What are the signs and symptoms of pleural effusion?    Cough, shortness of breath    Breathing faster than usual    Chest pain when you breathe in or cough    Fever  How is pleural effusion diagnosed? Your healthcare provider will examine you and listen to your heart and lungs through a stethoscope. You may need any of the following:    Blood tests may show signs of an infection.    An x-ray, ultrasound, or CT may show fluid around your lungs, an enlarged heart, or signs of infection. You may be given contrast liquid to help your lungs show up better in the pictures. Tell the healthcare provider if you have ever had an allergic reaction to contrast liquid.    A thoracentesis is a procedure to take fluid out of your chest through a needle put between your ribs. The fluid may be sent to a lab for tests.  Thoracentesis  How is pleural effusion treated? Treatment depends on the cause of your pleural effusion and your symptoms. You may need any of the following:    Cardiac medicines may be needed if your pleural effusion is caused by heart failure.    Antibiotics help treat an infection caused by bacteria.    NSAIDs help decrease swelling and pain or fever. This medicine is available with or without a doctor's order. NSAIDs can cause stomach bleeding or kidney problems in certain people. If you take blood thinner medicine, always ask your healthcare provider if NSAIDs are safe for you. Always read the medicine label and follow directions.    Prescription pain medicine may be given. Ask your healthcare provider how to take this medicine safely. Some prescription pain medicines contain acetaminophen. Do not take other medicines that contain acetaminophen without talking to your healthcare provider. Too much acetaminophen may cause liver damage. Prescription pain medicine may cause constipation. Ask your healthcare provider how to prevent or treat constipation.    Chemotherapy may be needed if your pleural effusion is caused by cancer.    Steroids,or other types of medicines, may be given to decrease swelling.    Drainage of extra pleural fluid may be done using thoracentesis or a chest tube. A chest tube may stay in your chest for days or weeks. This allows the extra fluid around your lungs to drain over time. You may need medicines put directly into your chest if the fluid does not drain out easily.  Chest Tube      Surgery may be needed if your pleural effusion keeps coming back or if it increases your risk for other problems.  How can I prevent another pleural effusion? Maintain a healthy lifestyle:    Eat a variety of healthy foods. Healthy foods help with overall health. Healthy foods include fruit, vegetables, whole-grain breads, low-fat dairy products, beans, lean meat, and fish. Limit sugar, alcohol, and fat.  Healthy Foods      Do not smoke, and do not allow others to smoke around you. Nicotine and other chemicals in cigarettes and cigars increase your risk for lung infections such as pneumonia. Ask your healthcare provider for information if you currently smoke and need help to quit. E-cigarettes or smokeless tobacco still contain nicotine. Talk to your healthcare provider before you use these products.    Drink liquids as directed and rest as needed. Liquids help keep your air passages moist. This can help your body get rid of germs and other irritants. Ask your healthcare provider how much liquid to drink each day and which liquids are best for you. You may feel like resting more. Slowly start to do more each day. Rest when you feel it is needed.    Exercise regularly. Ask about the best exercise plan for you. Exercise will lower your blood pressure and decrease stress. This helps decrease your risk for another pleural effusion, or a lung infection.  Call your local emergency number (911 in the [place]) if:    You find it very hard to breathe.    You feel faint, or you cannot think clearly.  When should I seek immediate care?    Your breathing problems do not go away, or they get worse.    When should I call my doctor?    Your lips or fingernails turn blue.    You have a fever.    Your pain does not go away or gets worse.    You cough up yellow, green, gray, or bloody mucus.    You have questions or concerns about your condition or care.  CARE AGREEMENT:    You have the right to help plan your care. Learn about your health condition and how it may be treated. Discuss treatment options with your healthcare providers to decide what care you want to receive. You always have the right to refuse treatment.    © [employer] 1973, 2024    	  back to top            © [employer] 1973, 2024

## 2024-12-24 NOTE — ED PROVIDER NOTE - CLINICAL SUMMARY MEDICAL DECISION MAKING FREE TEXT BOX
pt with sob at home, home oximeter reading 85%  pt with ho pleural and pericardial effusion in sept drained  will get labs including troponin and bnp, pt not sob now, comfortable, no c/o chest pain will get xray and give lasix. pt was on lasix but not since drain.pt is DNR/DNI and plan is if comfortable to [facility]

## 2024-12-24 NOTE — ED ADULT NURSE REASSESSMENT NOTE - NS ED NURSE REASSESS COMMENT FT1
Pt self-catheterizes at home and requesting cath. Bladder scan 508. MD Newsome aware. As per MD can use straight cath kit with smaller catheter to relieve pt.  Approx 300mL urine output. MD Song aware and is comfortable with amount of urine output. Pt endorsing relief. Safety and comfort maintained.

## 2024-12-24 NOTE — ED PROVIDER NOTE - PATIENT PORTAL LINK FT
You can access the FollowMyHealth Patient Portal offered by [facility] by registering at the following website: [email or web address]. By joining [facility]’s FollowMyHealth portal, you will also be able to view your health information using other applications (apps) compatible with our system.

## 2024-12-24 NOTE — ED ADULT NURSE REASSESSMENT NOTE - NS ED NURSE REASSESS COMMENT FT1
pt a two caregiver assist to wheelchair for d/c. As per son at bedside, pt is at baseline and is comfortable w/ d/c.

## 2024-12-24 NOTE — ED PROVIDER NOTE - NS ED ROS FT
GEN: Denies fever, chills, sick contacts, recent travel  HEENT: Denies dizziness, blurry vision, HA  Cardiac: +SOB Denies CP, palpitations  Lungs: Denies cough, wheezing  PV: +LE swelling  GI: Denies nausea, vomiting, diarrhea, constipation, flank pain  : Denies hematuria, dysuria, frequency, urgency  Skin: Denies rash, redness, open wound  MSK: Denies pain, decreased ROM  Neuro: Denies dizziness, difficulty speaking, difficulty swallowing, numbness/tingling in extremities, loss of bowel/bladder control, weakness in extremities

## 2024-12-24 NOTE — ED ADULT TRIAGE NOTE - CHIEF COMPLAINT QUOTE
patient brought in by EMS from home with aide c/o difficulty breathing.  reports some difficulty breathing for the last few days.  aide noticed labored breathing today and O2 sat in high 80s.  hx CHF, not on home O2.  did not take BP meds today

## 2024-12-24 NOTE — ED PROVIDER NOTE - OBJECTIVE STATEMENT
89 y/o M with PMH of CHF, rectal CA, CVA, ?lung CA not on treatment, HTN presents with SOB at home. Son at bedside reports pt was reporting feeling SOB, checked pulse ox at home which was 80-85% at home. 911 activated at that time. upon arrival o2 sate recorded by EMS %. Son notes he checked pulse ox on his wife and it also recorded 85% which led him to feel it was defective. No reported fever, chills. Son states patient was recently admitted for CHF exacerbation, also had cardiac tamponade requiring pericardial centesis. Denies fever, chills, cough. +leg swelling which appears slightly worse than baseline as per family.

## 2024-12-24 NOTE — ED ADULT NURSE NOTE - NSFALLHARMRISKINTERV_ED_ALL_ED

## 2024-12-24 NOTE — ED ADULT NURSE REASSESSMENT NOTE - NS ED NURSE REASSESS COMMENT FT1
Pt requesting to be straight catheterized 5 minutes after last cath. Verbal order from MARILUZ Kamara to place robertson while within ED due to Lasix administration and need for numerous straight caths per hour. Family and patient aware of supportive of plan. Robertson placed.

## 2024-12-24 NOTE — ED PROVIDER NOTE - PHYSICAL EXAMINATION
Gen: Well appearing.   HEENT: NC/AT.   Cardiac: s1s2, RRR.  Lungs: CTAB,   Abdomen: NBS x4, soft, nontender.   MSK: No obvious deformity to extremities.  Neuro: CN II-XII intact.   PV: 2+ LE edema to knees without calf tenderness.

## 2024-12-24 NOTE — ED ADULT NURSE NOTE - OBJECTIVE STATEMENT
Pt presents to ED AOx3 [place] from home w/ aide c/o increasing sob x few days. Pt denies sob on initial assessment. Son at bedside states pt was taken off Lasix in the beginning of November. Denies chest pain dizziness, n/v or fevers. PMhx of CHF, CVA x 5 years ago on eliquis, HTN and self-catheterization daily.

## 2024-12-24 NOTE — ED PROVIDER NOTE - ATTENDING APP SHARED VISIT CONTRIBUTION OF CARE
I, Dr. Marian Li DO, personally saw the patient with SHAWN.  I have personally performed a face to face diagnostic evaluation on this patient.  I have reviewed the SHAWN note and agree with the history, exam, and plan of care, except as noted.  I personally saw the patient and performed a substantive portion of the visit including all aspects of the medical decision making.  HAMIDA Song DO

## 2025-01-23 ENCOUNTER — INPATIENT (INPATIENT)
Facility: HOSPITAL | Age: 89
LOS: 5 days | Discharge: HOME HEALTH | DRG: 689 | End: 2025-01-29
Attending: FAMILY MEDICINE | Admitting: STUDENT IN AN ORGANIZED HEALTH CARE EDUCATION/TRAINING PROGRAM
Payer: MEDICARE

## 2025-01-23 VITALS
SYSTOLIC BLOOD PRESSURE: 136 MMHG | DIASTOLIC BLOOD PRESSURE: 78 MMHG | HEART RATE: 82 BPM | OXYGEN SATURATION: 96 % | HEIGHT: 70 IN | RESPIRATION RATE: 20 BRPM | TEMPERATURE: 97 F

## 2025-01-23 DIAGNOSIS — Z90.79 ACQUIRED ABSENCE OF OTHER GENITAL ORGAN(S): Chronic | ICD-10-CM

## 2025-01-23 DIAGNOSIS — R31.9 HEMATURIA, UNSPECIFIED: ICD-10-CM

## 2025-01-23 DIAGNOSIS — D37.9 NEOPLASM OF UNCERTAIN BEHAVIOR OF DIGESTIVE ORGAN, UNSPECIFIED: Chronic | ICD-10-CM

## 2025-01-23 LAB
ALBUMIN SERPL ELPH-MCNC: 2.6 G/DL — LOW (ref 3.3–5)
ALP SERPL-CCNC: 78 U/L — SIGNIFICANT CHANGE UP (ref 40–120)
ALT FLD-CCNC: 9 U/L — LOW (ref 12–78)
ANION GAP SERPL CALC-SCNC: 5 MMOL/L — SIGNIFICANT CHANGE UP (ref 5–17)
APPEARANCE UR: ABNORMAL
APTT BLD: 32.5 SEC — SIGNIFICANT CHANGE UP (ref 24.5–35.6)
AST SERPL-CCNC: 23 U/L — SIGNIFICANT CHANGE UP (ref 15–37)
BACTERIA # UR AUTO: ABNORMAL /HPF
BASOPHILS # BLD AUTO: 0.02 K/UL — SIGNIFICANT CHANGE UP (ref 0–0.2)
BASOPHILS NFR BLD AUTO: 0.3 % — SIGNIFICANT CHANGE UP (ref 0–2)
BILIRUB SERPL-MCNC: 0.7 MG/DL — SIGNIFICANT CHANGE UP (ref 0.2–1.2)
BILIRUB UR-MCNC: ABNORMAL
BUN SERPL-MCNC: 12 MG/DL — SIGNIFICANT CHANGE UP (ref 7–23)
CALCIUM SERPL-MCNC: 10.2 MG/DL — HIGH (ref 8.5–10.1)
CHLORIDE SERPL-SCNC: 89 MMOL/L — LOW (ref 96–108)
CO2 SERPL-SCNC: 31 MMOL/L — SIGNIFICANT CHANGE UP (ref 22–31)
COLOR SPEC: ABNORMAL
CREAT SERPL-MCNC: 0.86 MG/DL — SIGNIFICANT CHANGE UP (ref 0.5–1.3)
DIFF PNL FLD: ABNORMAL
EGFR: 83 ML/MIN/1.73M2 — SIGNIFICANT CHANGE UP
EOSINOPHIL # BLD AUTO: 0.02 K/UL — SIGNIFICANT CHANGE UP (ref 0–0.5)
EOSINOPHIL NFR BLD AUTO: 0.3 % — SIGNIFICANT CHANGE UP (ref 0–6)
EPI CELLS # UR: SIGNIFICANT CHANGE UP
GLUCOSE SERPL-MCNC: 126 MG/DL — HIGH (ref 70–99)
GLUCOSE UR QL: NEGATIVE MG/DL — SIGNIFICANT CHANGE UP
HCT VFR BLD CALC: 40.9 % — SIGNIFICANT CHANGE UP (ref 39–50)
HGB BLD-MCNC: 13.6 G/DL — SIGNIFICANT CHANGE UP (ref 13–17)
IMM GRANULOCYTES NFR BLD AUTO: 0.3 % — SIGNIFICANT CHANGE UP (ref 0–0.9)
INR BLD: 1.24 RATIO — HIGH (ref 0.85–1.16)
KETONES UR-MCNC: NEGATIVE MG/DL — SIGNIFICANT CHANGE UP
LACTATE SERPL-SCNC: 1.4 MMOL/L — SIGNIFICANT CHANGE UP (ref 0.7–2)
LEUKOCYTE ESTERASE UR-ACNC: ABNORMAL
LYMPHOCYTES # BLD AUTO: 0.97 K/UL — LOW (ref 1–3.3)
LYMPHOCYTES # BLD AUTO: 15 % — SIGNIFICANT CHANGE UP (ref 13–44)
MCHC RBC-ENTMCNC: 29.8 PG — SIGNIFICANT CHANGE UP (ref 27–34)
MCHC RBC-ENTMCNC: 33.3 G/DL — SIGNIFICANT CHANGE UP (ref 32–36)
MCV RBC AUTO: 89.7 FL — SIGNIFICANT CHANGE UP (ref 80–100)
MONOCYTES # BLD AUTO: 0.69 K/UL — SIGNIFICANT CHANGE UP (ref 0–0.9)
MONOCYTES NFR BLD AUTO: 10.7 % — SIGNIFICANT CHANGE UP (ref 2–14)
NEUTROPHILS # BLD AUTO: 4.74 K/UL — SIGNIFICANT CHANGE UP (ref 1.8–7.4)
NEUTROPHILS NFR BLD AUTO: 73.4 % — SIGNIFICANT CHANGE UP (ref 43–77)
NITRITE UR-MCNC: POSITIVE
PH UR: 7.5 — SIGNIFICANT CHANGE UP (ref 5–8)
PLATELET # BLD AUTO: 145 K/UL — LOW (ref 150–400)
POTASSIUM SERPL-MCNC: 4.6 MMOL/L — SIGNIFICANT CHANGE UP (ref 3.5–5.3)
POTASSIUM SERPL-SCNC: 4.6 MMOL/L — SIGNIFICANT CHANGE UP (ref 3.5–5.3)
PROT SERPL-MCNC: 6 GM/DL — SIGNIFICANT CHANGE UP (ref 6–8.3)
PROT UR-MCNC: 100 MG/DL
PROTHROM AB SERPL-ACNC: 14.6 SEC — HIGH (ref 9.9–13.4)
RBC # BLD: 4.56 M/UL — SIGNIFICANT CHANGE UP (ref 4.2–5.8)
RBC # FLD: 15.1 % — HIGH (ref 10.3–14.5)
RBC CASTS # UR COMP ASSIST: SIGNIFICANT CHANGE UP /HPF (ref 0–4)
SODIUM SERPL-SCNC: 125 MMOL/L — LOW (ref 135–145)
SP GR SPEC: 1.01 — SIGNIFICANT CHANGE UP (ref 1–1.03)
UROBILINOGEN FLD QL: 0.2 MG/DL — SIGNIFICANT CHANGE UP (ref 0.2–1)
WBC # BLD: 6.46 K/UL — SIGNIFICANT CHANGE UP (ref 3.8–10.5)
WBC # FLD AUTO: 6.46 K/UL — SIGNIFICANT CHANGE UP (ref 3.8–10.5)
WBC UR QL: SIGNIFICANT CHANGE UP /HPF (ref 0–5)

## 2025-01-23 PROCEDURE — 93308 TTE F-UP OR LMTD: CPT

## 2025-01-23 PROCEDURE — 84443 ASSAY THYROID STIM HORMONE: CPT

## 2025-01-23 PROCEDURE — P9047: CPT | Mod: JZ

## 2025-01-23 PROCEDURE — 82607 VITAMIN B-12: CPT

## 2025-01-23 PROCEDURE — 84100 ASSAY OF PHOSPHORUS: CPT

## 2025-01-23 PROCEDURE — 86140 C-REACTIVE PROTEIN: CPT

## 2025-01-23 PROCEDURE — 84145 PROCALCITONIN (PCT): CPT

## 2025-01-23 PROCEDURE — 82306 VITAMIN D 25 HYDROXY: CPT

## 2025-01-23 PROCEDURE — 99285 EMERGENCY DEPT VISIT HI MDM: CPT

## 2025-01-23 PROCEDURE — 85027 COMPLETE CBC AUTOMATED: CPT

## 2025-01-23 PROCEDURE — 74176 CT ABD & PELVIS W/O CONTRAST: CPT | Mod: 26

## 2025-01-23 PROCEDURE — 83036 HEMOGLOBIN GLYCOSYLATED A1C: CPT

## 2025-01-23 PROCEDURE — 82728 ASSAY OF FERRITIN: CPT

## 2025-01-23 PROCEDURE — 82746 ASSAY OF FOLIC ACID SERUM: CPT

## 2025-01-23 PROCEDURE — 74178 CT ABD&PLV WO CNTR FLWD CNTR: CPT | Mod: MC

## 2025-01-23 PROCEDURE — 93306 TTE W/DOPPLER COMPLETE: CPT

## 2025-01-23 PROCEDURE — 80069 RENAL FUNCTION PANEL: CPT

## 2025-01-23 PROCEDURE — 83540 ASSAY OF IRON: CPT

## 2025-01-23 PROCEDURE — 71045 X-RAY EXAM CHEST 1 VIEW: CPT

## 2025-01-23 PROCEDURE — 97110 THERAPEUTIC EXERCISES: CPT | Mod: GP

## 2025-01-23 PROCEDURE — 94760 N-INVAS EAR/PLS OXIMETRY 1: CPT

## 2025-01-23 PROCEDURE — 97162 PT EVAL MOD COMPLEX 30 MIN: CPT | Mod: GP

## 2025-01-23 PROCEDURE — 85025 COMPLETE CBC W/AUTO DIFF WBC: CPT

## 2025-01-23 PROCEDURE — 83930 ASSAY OF BLOOD OSMOLALITY: CPT

## 2025-01-23 PROCEDURE — 94640 AIRWAY INHALATION TREATMENT: CPT

## 2025-01-23 PROCEDURE — 80048 BASIC METABOLIC PNL TOTAL CA: CPT

## 2025-01-23 PROCEDURE — 80053 COMPREHEN METABOLIC PANEL: CPT

## 2025-01-23 PROCEDURE — 93010 ELECTROCARDIOGRAM REPORT: CPT

## 2025-01-23 PROCEDURE — 82962 GLUCOSE BLOOD TEST: CPT

## 2025-01-23 PROCEDURE — 93321 DOPPLER ECHO F-UP/LMTD STD: CPT

## 2025-01-23 PROCEDURE — 83880 ASSAY OF NATRIURETIC PEPTIDE: CPT

## 2025-01-23 PROCEDURE — 84550 ASSAY OF BLOOD/URIC ACID: CPT

## 2025-01-23 PROCEDURE — 83550 IRON BINDING TEST: CPT

## 2025-01-23 PROCEDURE — 93971 EXTREMITY STUDY: CPT | Mod: LT

## 2025-01-23 PROCEDURE — 83735 ASSAY OF MAGNESIUM: CPT

## 2025-01-23 PROCEDURE — 36415 COLL VENOUS BLD VENIPUNCTURE: CPT

## 2025-01-23 RX ORDER — ONDANSETRON 4 MG/1
4 TABLET, ORALLY DISINTEGRATING ORAL ONCE
Refills: 0 | Status: COMPLETED | OUTPATIENT
Start: 2025-01-23 | End: 2025-01-23

## 2025-01-23 RX ORDER — ONDANSETRON 4 MG/1
4 TABLET, ORALLY DISINTEGRATING ORAL EVERY 6 HOURS
Refills: 0 | Status: DISCONTINUED | OUTPATIENT
Start: 2025-01-23 | End: 2025-01-29

## 2025-01-23 RX ORDER — ACETAMINOPHEN 160 MG/5ML
650 SUSPENSION ORAL EVERY 6 HOURS
Refills: 0 | Status: DISCONTINUED | OUTPATIENT
Start: 2025-01-23 | End: 2025-01-29

## 2025-01-23 RX ORDER — CEFTRIAXONE 250 MG/1
1000 INJECTION, POWDER, FOR SOLUTION INTRAMUSCULAR; INTRAVENOUS ONCE
Refills: 0 | Status: DISCONTINUED | OUTPATIENT
Start: 2025-01-23 | End: 2025-01-23

## 2025-01-23 RX ORDER — LIDOCAINE HYDROCHLORIDE 10 MG/ML
5 INJECTION EPIDURAL; INFILTRATION; INTRACAUDAL ONCE
Refills: 0 | Status: COMPLETED | OUTPATIENT
Start: 2025-01-23 | End: 2025-01-23

## 2025-01-23 RX ORDER — OXYBUTYNIN CHLORIDE 5 MG/1
5 TABLET, EXTENDED RELEASE ORAL ONCE
Refills: 0 | Status: COMPLETED | OUTPATIENT
Start: 2025-01-23 | End: 2025-01-23

## 2025-01-23 RX ORDER — ACETAMINOPHEN 160 MG/5ML
1000 SUSPENSION ORAL ONCE
Refills: 0 | Status: COMPLETED | OUTPATIENT
Start: 2025-01-23 | End: 2025-01-23

## 2025-01-23 RX ORDER — MORPHINE SULFATE 60 MG/1
2 TABLET, FILM COATED, EXTENDED RELEASE ORAL ONCE
Refills: 0 | Status: DISCONTINUED | OUTPATIENT
Start: 2025-01-23 | End: 2025-01-23

## 2025-01-23 RX ORDER — ACETAMINOPHEN, DIPHENHYDRAMINE HCL, PHENYLEPHRINE HCL 325; 25; 5 MG/1; MG/1; MG/1
3 TABLET ORAL AT BEDTIME
Refills: 0 | Status: DISCONTINUED | OUTPATIENT
Start: 2025-01-23 | End: 2025-01-29

## 2025-01-23 RX ORDER — CEFTRIAXONE 250 MG/1
1000 INJECTION, POWDER, FOR SOLUTION INTRAMUSCULAR; INTRAVENOUS ONCE
Refills: 0 | Status: COMPLETED | OUTPATIENT
Start: 2025-01-23 | End: 2025-01-23

## 2025-01-23 RX ORDER — BACTERIOSTATIC SODIUM CHLORIDE 0.9 %
1000 VIAL (ML) INJECTION ONCE
Refills: 0 | Status: COMPLETED | OUTPATIENT
Start: 2025-01-23 | End: 2025-01-23

## 2025-01-23 RX ADMIN — ACETAMINOPHEN, DIPHENHYDRAMINE HCL, PHENYLEPHRINE HCL 3 MILLIGRAM(S): 325; 25; 5 TABLET ORAL at 22:30

## 2025-01-23 RX ADMIN — CEFTRIAXONE 1000 MILLIGRAM(S): 250 INJECTION, POWDER, FOR SOLUTION INTRAMUSCULAR; INTRAVENOUS at 16:57

## 2025-01-23 RX ADMIN — Medication 1000 MILLILITER(S): at 16:57

## 2025-01-23 RX ADMIN — ACETAMINOPHEN 1000 MILLIGRAM(S): 160 SUSPENSION ORAL at 20:13

## 2025-01-23 RX ADMIN — ACETAMINOPHEN 400 MILLIGRAM(S): 160 SUSPENSION ORAL at 15:17

## 2025-01-23 RX ADMIN — OXYBUTYNIN CHLORIDE 5 MILLIGRAM(S): 5 TABLET, EXTENDED RELEASE ORAL at 23:59

## 2025-01-23 RX ADMIN — ONDANSETRON 4 MILLIGRAM(S): 4 TABLET, ORALLY DISINTEGRATING ORAL at 15:17

## 2025-01-23 RX ADMIN — LIDOCAINE HYDROCHLORIDE 5 MILLILITER(S): 10 INJECTION EPIDURAL; INFILTRATION; INTRACAUDAL at 15:17

## 2025-01-23 RX ADMIN — ACETAMINOPHEN 650 MILLIGRAM(S): 160 SUSPENSION ORAL at 20:18

## 2025-01-23 NOTE — ED PROVIDER NOTE - CARE PLAN
Principal Discharge DX:	Hematuria  Secondary Diagnosis:	Hyponatremia  Secondary Diagnosis:	Bladder mass   1

## 2025-01-23 NOTE — ED ADULT NURSE REASSESSMENT NOTE - NS ED NURSE REASSESS COMMENT FT1
3 way Crump placed and irrigated, large amount of small clots removed, Crump now draining clear fluid w/o issue

## 2025-01-23 NOTE — ED ADULT NURSE NOTE - NSFALLOOBATTEMPT_ED_ALL_ED
REVIEW OF SYSTEMS:  General:  no fever, no chills  HEENT: no headache, no vision changes  Cardiac: no chest pain, no palpitations  Respiratory: no cough, no shortness of breath  Gastrointestinal: no abdominal pain, no nausea, no vomiting, no diarrhea  Genitourinary: no hematuria, no dysuria, no urinary frequency  Extremities: no extremity swelling, no extremity pain  Neuro: no focal weakness, no numbness/tingling of the extremities, no decreased sensation  Heme: no easy bleeding, no easy bruising  Skin: no jaundice,  no rashes, no lesions  All other ROS as documented in HPI  -Juan F Donahue, PGY-3
No

## 2025-01-23 NOTE — ED PROVIDER NOTE - PHYSICAL EXAMINATION
Constitutional: NAD AOx3 chronically ill appearing   Eyes: PERRL EOMI  Head: Normocephalic atraumatic  Mouth: MMM  Cardiac: regular rate and rhythm  Resp: Lungs CTAB  GI: Abd s/nd/suprapubic ttp  Neuro: CN2-12 grossly intact, RUFFIN x 4  Skin: No visible rashes   extrem: no edema

## 2025-01-23 NOTE — ED ADULT TRIAGE NOTE - CHIEF COMPLAINT QUOTE
Patient presents with EMS from [facility] with health aide at bedside. Patient reporting suprapubic pain. Patient has chronic robertson unsure if it is suprapubiv robertson or not. Patient bedbound

## 2025-01-23 NOTE — PATIENT PROFILE ADULT - FALL HARM RISK - HARM RISK INTERVENTIONS

## 2025-01-23 NOTE — ED PROVIDER NOTE - CLINICAL SUMMARY MEDICAL DECISION MAKING FREE TEXT BOX
89-year-old male from home with hematuria.  Also with suprapubic tenderness. on eliquis.  Will initiate CBI.  Will check labs, urine, CT abdomen pelvis.  Observation and reeval.

## 2025-01-23 NOTE — ED PROVIDER NOTE - OBJECTIVE STATEMENT
89-year-old male brought in by EMS from home with past medical history of hypertension, enlarged prostate, GERD, rectal cancer, asthma, TIA with chronic Crump since mid December 2024, with complaints of onset of blood in the Crump bag since this morning.  Patient also complaining of lower abdominal pain.  Patient is on daily Eliquis.  Family at bedside states they have never noted blood in the Crump bag in the past.   No fevers.  Normal p.o. intake.  No back pain.  Denies history of kidney stones.   Urologist is not from [facility] or [facility].   Prior to having an indwelling Crump placed in December 2024 patient would self cath daily.   Family also noted leaking of urine around the Crump catheter.

## 2025-01-23 NOTE — ED ADULT NURSE NOTE - OBJECTIVE STATEMENT
pt presenting from home with daughter and home aide. Pt has had known prostate issues and "fluid retention" where he has to straight cath himself multiplet times a day. Pt recently with CHF and "fluid on his lungs" was on lasix which per the daughters reports caused Potassium and Sodium issues. Pt was told to stop the lasix and to take sodium pills along with potassium meds, "but after that his potassium ended up high." Patients care team agreed to place Crump in the patient, and this morning pt saw BRB urine. Pt reports suprapubic pain, on blood thinners. MD at bedside for eval. Pt edematous. Denies SOB pt presenting from home with daughter and home aide. Pt has had known prostate issues and "fluid retention" where he has to straight cath himself multiplet times a day. Pt recently with "fluid on his lungs" and arms and legs bc he has "protein issues" and was on lasix. Daughters reports this caused Potassium and Sodium issues. Pt was told to stop the lasix and to take sodium pills along with potassium meds, "but after that his potassium ended up high." Patients care team agreed to place Crump in the patient, and this morning pt saw BRB urine. Pt reports suprapubic pain, on blood thinners. MD at bedside for eval. Pt edematous. Denies SOB

## 2025-01-23 NOTE — PATIENT PROFILE ADULT - FUNCTIONAL ASSESSMENT - DAILY ACTIVITY 5.
[FreeTextEntry1] : I saw the patient Tasneem Rai  in the office today.  The patient is an 82-year-old female known to our service.  The patient underwent an endoscopy and colonoscopy approximately 2 years ago and has a known redundant colon with diverticulosis and spasm.  The patient suffers from chronic constipation and occasionally takes laxatives.  Most recently she notices that she is skipping 3 to 4 days and has a feeling of abdominal bloating and discomfort.  There is no significant abdominal pain but the patient does notice some discomfort after eating.  This is invariably relieved when she has a complete evacuation.  There has been no blood in the stool or on the toilet tissue.  Patient denies a history of acid reflux.  She tried MiraLAX but has not been using it regularly.  The patient also had a CAT scan done in 2018 which was negative for significant intra-abdominal pathology.
1 = Total assistance

## 2025-01-23 NOTE — ED ADULT NURSE NOTE - CHIEF COMPLAINT QUOTE
Patient presents with EMS from [facility] with health aide at bedside. Patient reporting suprapubic pain. Patient has chronic robertson unsure if it is suprapubiv robertson or not. Patient bedbound

## 2025-01-23 NOTE — PATIENT PROFILE ADULT - VISION (WITH CORRECTIVE LENSES IF THE PATIENT USUALLY WEARS THEM):
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 Overall, things look good today and the same as when she left the hospital. No changes to her plan from a cardiac standpoint!     The following tests were done today for Meri:    Examination: Normal  EKG: The same as last time  Echo: The same as last time     Follow-up with Cardiology: 2-3 months  Restrictions related to Meri's heart: none  Meri does not need antibiotics before seeing the dentist     Please reach out to us if you have any questions or new concerns about Meri's heart, or what we spoke about at today's visit. You can call us at [phone], or send us a message through [employer].   Partially impaired: cannot see medication labels or newsprint, but can see obstacles in path, and the surrounding layout; can count fingers at arm's length

## 2025-01-24 ENCOUNTER — RESULT REVIEW (OUTPATIENT)
Age: 89
End: 2025-01-24

## 2025-01-24 DIAGNOSIS — I31.39 OTHER PERICARDIAL EFFUSION (NONINFLAMMATORY): ICD-10-CM

## 2025-01-24 DIAGNOSIS — I50.9 HEART FAILURE, UNSPECIFIED: ICD-10-CM

## 2025-01-24 DIAGNOSIS — J90 PLEURAL EFFUSION, NOT ELSEWHERE CLASSIFIED: ICD-10-CM

## 2025-01-24 DIAGNOSIS — Z95.0 PRESENCE OF CARDIAC PACEMAKER: ICD-10-CM

## 2025-01-24 DIAGNOSIS — I10 ESSENTIAL (PRIMARY) HYPERTENSION: ICD-10-CM

## 2025-01-24 LAB
A1C WITH ESTIMATED AVERAGE GLUCOSE RESULT: 5.5 % — SIGNIFICANT CHANGE UP (ref 4–5.6)
ADD ON TEST-SPECIMEN IN LAB: SIGNIFICANT CHANGE UP
ANION GAP SERPL CALC-SCNC: 2 MMOL/L — LOW (ref 5–17)
BUN SERPL-MCNC: 12 MG/DL — SIGNIFICANT CHANGE UP (ref 7–23)
CALCIUM SERPL-MCNC: 9.4 MG/DL — SIGNIFICANT CHANGE UP (ref 8.5–10.1)
CHLORIDE SERPL-SCNC: 95 MMOL/L — LOW (ref 96–108)
CO2 SERPL-SCNC: 27 MMOL/L — SIGNIFICANT CHANGE UP (ref 22–31)
CREAT SERPL-MCNC: 0.77 MG/DL — SIGNIFICANT CHANGE UP (ref 0.5–1.3)
EGFR: 86 ML/MIN/1.73M2 — SIGNIFICANT CHANGE UP
ESTIMATED AVERAGE GLUCOSE: 111 MG/DL — SIGNIFICANT CHANGE UP (ref 68–114)
GLUCOSE BLDC GLUCOMTR-MCNC: 108 MG/DL — HIGH (ref 70–99)
GLUCOSE BLDC GLUCOMTR-MCNC: 124 MG/DL — HIGH (ref 70–99)
GLUCOSE BLDC GLUCOMTR-MCNC: 125 MG/DL — HIGH (ref 70–99)
GLUCOSE BLDC GLUCOMTR-MCNC: 142 MG/DL — HIGH (ref 70–99)
GLUCOSE SERPL-MCNC: 107 MG/DL — HIGH (ref 70–99)
HCT VFR BLD CALC: 35.9 % — LOW (ref 39–50)
HGB BLD-MCNC: 11.8 G/DL — LOW (ref 13–17)
MAGNESIUM SERPL-MCNC: 1.8 MG/DL — SIGNIFICANT CHANGE UP (ref 1.6–2.6)
MCHC RBC-ENTMCNC: 29.6 PG — SIGNIFICANT CHANGE UP (ref 27–34)
MCHC RBC-ENTMCNC: 32.9 G/DL — SIGNIFICANT CHANGE UP (ref 32–36)
MCV RBC AUTO: 90 FL — SIGNIFICANT CHANGE UP (ref 80–100)
NT-PROBNP SERPL-SCNC: 2176 PG/ML — HIGH (ref 0–450)
PHOSPHATE SERPL-MCNC: 3.2 MG/DL — SIGNIFICANT CHANGE UP (ref 2.5–4.5)
PLATELET # BLD AUTO: 136 K/UL — LOW (ref 150–400)
POTASSIUM SERPL-MCNC: 4.6 MMOL/L — SIGNIFICANT CHANGE UP (ref 3.5–5.3)
POTASSIUM SERPL-SCNC: 4.6 MMOL/L — SIGNIFICANT CHANGE UP (ref 3.5–5.3)
RBC # BLD: 3.99 M/UL — LOW (ref 4.2–5.8)
RBC # FLD: 15.3 % — HIGH (ref 10.3–14.5)
SODIUM SERPL-SCNC: 124 MMOL/L — LOW (ref 135–145)
URATE SERPL-MCNC: 3.7 MG/DL — SIGNIFICANT CHANGE UP (ref 3.4–8.8)
WBC # BLD: 7.11 K/UL — SIGNIFICANT CHANGE UP (ref 3.8–10.5)
WBC # FLD AUTO: 7.11 K/UL — SIGNIFICANT CHANGE UP (ref 3.8–10.5)

## 2025-01-24 PROCEDURE — 99222 1ST HOSP IP/OBS MODERATE 55: CPT

## 2025-01-24 PROCEDURE — 99232 SBSQ HOSP IP/OBS MODERATE 35: CPT | Mod: FS

## 2025-01-24 PROCEDURE — 93306 TTE W/DOPPLER COMPLETE: CPT | Mod: 26

## 2025-01-24 PROCEDURE — 71045 X-RAY EXAM CHEST 1 VIEW: CPT | Mod: 26

## 2025-01-24 PROCEDURE — 99222 1ST HOSP IP/OBS MODERATE 55: CPT | Mod: FS

## 2025-01-24 PROCEDURE — 99232 SBSQ HOSP IP/OBS MODERATE 35: CPT

## 2025-01-24 PROCEDURE — 99223 1ST HOSP IP/OBS HIGH 75: CPT

## 2025-01-24 RX ORDER — SODIUM CHLORIDE 9 G/ML
1000 INJECTION, SOLUTION INTRAVENOUS
Refills: 0 | Status: DISCONTINUED | OUTPATIENT
Start: 2025-01-24 | End: 2025-01-29

## 2025-01-24 RX ORDER — INSULIN LISPRO 100/ML
VIAL (ML) SUBCUTANEOUS AT BEDTIME
Refills: 0 | Status: DISCONTINUED | OUTPATIENT
Start: 2025-01-24 | End: 2025-01-29

## 2025-01-24 RX ORDER — DM/PSEUDOEPHED/ACETAMINOPHEN 10-30-250
12.5 CAPSULE ORAL ONCE
Refills: 0 | Status: DISCONTINUED | OUTPATIENT
Start: 2025-01-24 | End: 2025-01-29

## 2025-01-24 RX ORDER — ACETAMINOPHEN 160 MG/5ML
1000 SUSPENSION ORAL ONCE
Refills: 0 | Status: DISCONTINUED | OUTPATIENT
Start: 2025-01-24 | End: 2025-01-29

## 2025-01-24 RX ORDER — FLUTICASONE PROPIONATE AND SALMETEROL 113; 14 UG/1; UG/1
1 POWDER, METERED RESPIRATORY (INHALATION)
Refills: 0 | Status: DISCONTINUED | OUTPATIENT
Start: 2025-01-24 | End: 2025-01-29

## 2025-01-24 RX ORDER — CLONAZEPAM 2 MG
1 TABLET ORAL
Refills: 0 | DISCHARGE

## 2025-01-24 RX ORDER — SULFASALAZINE 500 MG
500 TABLET ORAL DAILY
Refills: 0 | Status: DISCONTINUED | OUTPATIENT
Start: 2025-01-24 | End: 2025-01-29

## 2025-01-24 RX ORDER — DOXAZOSIN MESYLATE 4 MG
4 TABLET ORAL AT BEDTIME
Refills: 0 | Status: DISCONTINUED | OUTPATIENT
Start: 2025-01-24 | End: 2025-01-29

## 2025-01-24 RX ORDER — LINACLOTIDE 290 UG/1
1 CAPSULE, GELATIN COATED ORAL
Refills: 0 | DISCHARGE

## 2025-01-24 RX ORDER — GLUCAGON 3 MG/1
1 POWDER NASAL ONCE
Refills: 0 | Status: DISCONTINUED | OUTPATIENT
Start: 2025-01-24 | End: 2025-01-29

## 2025-01-24 RX ORDER — METOPROLOL SUCCINATE 25 MG
1 TABLET, EXTENDED RELEASE 24 HR ORAL
Refills: 0 | DISCHARGE

## 2025-01-24 RX ORDER — APIXABAN 5 MG/1
2.5 TABLET, FILM COATED ORAL
Refills: 0 | Status: DISCONTINUED | OUTPATIENT
Start: 2025-01-24 | End: 2025-01-24

## 2025-01-24 RX ORDER — CEFTRIAXONE 250 MG/1
1000 INJECTION, POWDER, FOR SOLUTION INTRAMUSCULAR; INTRAVENOUS EVERY 24 HOURS
Refills: 0 | Status: DISCONTINUED | OUTPATIENT
Start: 2025-01-24 | End: 2025-01-25

## 2025-01-24 RX ORDER — DM/PSEUDOEPHED/ACETAMINOPHEN 10-30-250
25 CAPSULE ORAL ONCE
Refills: 0 | Status: DISCONTINUED | OUTPATIENT
Start: 2025-01-24 | End: 2025-01-29

## 2025-01-24 RX ORDER — ALBUTEROL 90 MCG
3 AEROSOL REFILL (GRAM) INHALATION
Refills: 0 | DISCHARGE

## 2025-01-24 RX ORDER — ATORVASTATIN CALCIUM 80 MG/1
80 TABLET, FILM COATED ORAL AT BEDTIME
Refills: 0 | Status: DISCONTINUED | OUTPATIENT
Start: 2025-01-24 | End: 2025-01-29

## 2025-01-24 RX ORDER — INSULIN LISPRO 100/ML
VIAL (ML) SUBCUTANEOUS
Refills: 0 | Status: DISCONTINUED | OUTPATIENT
Start: 2025-01-24 | End: 2025-01-29

## 2025-01-24 RX ORDER — KETOROLAC TROMETHAMINE 10 MG
15 TABLET ORAL ONCE
Refills: 0 | Status: DISCONTINUED | OUTPATIENT
Start: 2025-01-24 | End: 2025-01-24

## 2025-01-24 RX ORDER — INSULIN GLARGINE-YFGN 100 [IU]/ML
20 INJECTION, SOLUTION SUBCUTANEOUS AT BEDTIME
Refills: 0 | Status: DISCONTINUED | OUTPATIENT
Start: 2025-01-24 | End: 2025-01-28

## 2025-01-24 RX ORDER — DM/PSEUDOEPHED/ACETAMINOPHEN 10-30-250
15 CAPSULE ORAL ONCE
Refills: 0 | Status: DISCONTINUED | OUTPATIENT
Start: 2025-01-24 | End: 2025-01-29

## 2025-01-24 RX ADMIN — Medication 4 MILLIGRAM(S): at 21:49

## 2025-01-24 RX ADMIN — ACETAMINOPHEN 650 MILLIGRAM(S): 160 SUSPENSION ORAL at 17:43

## 2025-01-24 RX ADMIN — Medication 15 MILLIGRAM(S): at 10:26

## 2025-01-24 RX ADMIN — Medication 40 MILLIGRAM(S): at 10:17

## 2025-01-24 RX ADMIN — Medication 500 MILLIGRAM(S): at 10:17

## 2025-01-24 RX ADMIN — ACETAMINOPHEN 650 MILLIGRAM(S): 160 SUSPENSION ORAL at 17:13

## 2025-01-24 RX ADMIN — ACETAMINOPHEN, DIPHENHYDRAMINE HCL, PHENYLEPHRINE HCL 3 MILLIGRAM(S): 325; 25; 5 TABLET ORAL at 21:49

## 2025-01-24 RX ADMIN — FLUTICASONE PROPIONATE AND SALMETEROL 1 DOSE(S): 113; 14 POWDER, METERED RESPIRATORY (INHALATION) at 21:22

## 2025-01-24 RX ADMIN — INSULIN GLARGINE-YFGN 20 UNIT(S): 100 INJECTION, SOLUTION SUBCUTANEOUS at 22:04

## 2025-01-24 RX ADMIN — Medication 15 MILLIGRAM(S): at 10:18

## 2025-01-24 RX ADMIN — FLUTICASONE PROPIONATE AND SALMETEROL 1 DOSE(S): 113; 14 POWDER, METERED RESPIRATORY (INHALATION) at 08:19

## 2025-01-24 RX ADMIN — ATORVASTATIN CALCIUM 80 MILLIGRAM(S): 80 TABLET, FILM COATED ORAL at 21:49

## 2025-01-24 RX ADMIN — CEFTRIAXONE 1000 MILLIGRAM(S): 250 INJECTION, POWDER, FOR SOLUTION INTRAMUSCULAR; INTRAVENOUS at 17:13

## 2025-01-24 NOTE — GOALS OF CARE CONVERSATION - ADVANCED CARE PLANNING - PARTICIPANTS
Pt. with simple capacity, gave team permission to speak with his daughter/Family/Staff Pt. with simple capacity, requested team speak with his daughter/Family/Staff

## 2025-01-24 NOTE — DIETITIAN INITIAL EVALUATION ADULT - ORAL INTAKE PTA/DIET HISTORY
Home health aide at bedside w/ pt during RD visit. Pt lives at home by himself; has 24/7 home health aides. A friend will shop and cook for him. Pt reports "so-so" appetite. Aide reports pt will often drink smoothie with protein powder added; states pt does not really like Ensure shakes.

## 2025-01-24 NOTE — CONSULT NOTE ADULT - NS ATTEND AMEND GEN_ALL_CORE FT
Patient was seen and examined by me, agree with above note, where necessary edits were made.
Discussed above with NP   ADHF - diuresing with lasix  Pericardial effusion - no clinical signs of tamponade. will f/u echo

## 2025-01-24 NOTE — PROGRESS NOTE ADULT - ASSESSMENT
89 y/o M with a PMH of A-fib on Eliquis, GERD, hypertension, hyperlipidemia, CVA, diabetes, rectal CA with chronic diarrhea from enemas, atonic bladder with self-catheterizations, status post permanent pacemaker for bradycardia and syncope and persistent known moderate sized pericardial effusion presenting to  ED on 1/23/25 for suprapubic pain. He has a chronic Crump that was placed in [facility] on 12/24/24. Crump was functioning well until the morning of admission when the home health aide noted bright red blood in the bag. Patient also complaining of lower abdominal pain.  Patient is on daily Eliquis.  No fevers.  Normal p.o. intake.  No back pain.  Denies history of kidney stones. Prior to having an indwelling Crump placed in December 2024 patient would self cath daily (6 times a day).    #Hematuria   #UTI   #Possible bladder mass  - Crump changed in the ED - now without blood appreciated   - Hgb stable    - Abnormal UA  - Continue rocephin  - F/u urine and blood cultures   - F/u urology consult     #Moderate to large bilateral pleural effusions   - As per CT abdomen and pelvis   - Will consult CTS to    - Hold eliquis for now   - BNP elevated  - FU echo  - Continue IV Lasix 40 mg QD  - Appears fluid overload   - Cardiology consult     #Hyponatremia   - hypervolemic hyponatremia   - He received 1L NS bolus in the ED  -  today  - Continue lasix   - F/u AM BMP  - Nephrology consult    #Afib   - Holding off on Eliquis   - Continue metoprolol succinate 25 mg QD    #T2DM  - Continue lantus  - Continue insulin sliding scale    #DVT ppx   - Takes Eliquis 2.5 mg - will hold off for now  - SCDs     89 y/o M with a PMH of A-fib on Eliquis, GERD, hypertension, hyperlipidemia, CVA, diabetes, rectal CA with chronic diarrhea from enemas, atonic bladder with self-catheterizations, status post permanent pacemaker for bradycardia and syncope and persistent known moderate sized pericardial effusion presenting to  ED on 1/23/25 for suprapubic pain. He has a chronic Crump that was placed in [facility] on 12/24/24. Crump was functioning well until the morning of admission when the home health aide noted bright red blood in the bag. Patient also complaining of lower abdominal pain.  Patient is on daily Eliquis.  No fevers.  Normal p.o. intake.  No back pain.  Denies history of kidney stones. Prior to having an indwelling Crump placed in December 2024 patient would self cath daily (6 times a day).    #Hematuria   #UTI   #Possible bladder mass  - Crump changed in the ED - now without blood appreciated   - Hgb stable    - Abnormal UA  - Continue rocephin  - F/u urine and blood cultures   - F/u urology consult     #Moderate to large bilateral pleural effusions   - As per CT abdomen and pelvis   - Will consult CTS to    - Hold eliquis for now   - BNP elevated  - FU echo  - Continue IV Lasix 40 mg QD  - Appears fluid overload   - Cardiology consult     #Hyponatremia   - hypervolemic hyponatremia   - He received 1L NS bolus in the ED  -  today  - Continue lasix   - F/U urine/serum studies   - Nephrology consult    #Afib   - Holding off on Eliquis   - Continue metoprolol succinate 25 mg QD    #T2DM  - Continue lantus  - Continue insulin sliding scale    #DVT ppx   - Takes Eliquis 2.5 mg - will hold off for now  - SCDs     89 y/o M with a PMH of A-fib on Eliquis, GERD, hypertension, hyperlipidemia, CVA, diabetes, rectal CA with chronic diarrhea from enemas, atonic bladder with self-catheterizations, status post permanent pacemaker for bradycardia and syncope and persistent known moderate sized pericardial effusion presenting to  ED on 1/23/25 for suprapubic pain. He has a chronic Robertson that was placed in [facility] on 12/24/24. Robertson was functioning well until the morning of admission when the home health aide noted bright red blood in the bag. Patient also complaining of lower abdominal pain.  Patient is on daily Eliquis.  No fevers.  Normal p.o. intake.  No back pain.  Denies history of kidney stones. Prior to having an indwelling Robertson placed in December 2024 patient would self cath daily (6 times a day).    #Hematuria   #UTI   #Possible bladder mass  - Robertson changed in the ED - now without blood appreciated   - Hgb stable    - Abnormal UA  - Continue rocephin  - F/u urine and blood cultures   - F/u urology consult     #Moderate to large bilateral pleural effusions  # Likely  Acute on chronic HFpEF   - As per CT abdomen and pelvis   - Will consult CTS to    - Hold eliquis for now   - BNP elevated  - Check echo  - Continue IV Lasix 40 mg QD  - Appears fluid overload   - Cardiology consult     #Hyponatremia   - hypervolemic hyponatremia   - He received 1L NS bolus in the ED  -  today  - Continue lasix   - F/U urine/serum studies   - Nephrology consult    #Afib   - Holding off on Eliquis for possible thoracentesis   - Continue metoprolol succinate 25 mg QD    # History of UC  - Continue sulfasalazine    # BPH  - Chronic robertson  - Will continue doxasozin till urology sees    #T2DM  - Continue lantus  - Continue insulin sliding scale    #DVT ppx   - Takes Eliquis 2.5 mg - will hold off for now  - SCDs     89 y/o M with a PMH of A-fib on Eliquis, GERD, hypertension, hyperlipidemia, CVA, diabetes, rectal CA with chronic diarrhea from enemas, atonic bladder with self-catheterizations, status post permanent pacemaker for bradycardia and syncope and persistent known moderate sized pericardial effusion presenting to  ED on 1/23/25 for suprapubic pain. He has a chronic Robertson that was placed in [facility] on 12/24/24. Rboertson was functioning well until the morning of admission when the home health aide noted bright red blood in the bag. Patient also complaining of lower abdominal pain.  Patient is on daily Eliquis.  No fevers.  Normal p.o. intake.  No back pain.  Denies history of kidney stones. Prior to having an indwelling Robertson placed in December 2024 patient would self cath daily (6 times a day).    #Hematuria   #UTI   #Possible bladder mass  - Robertson changed in the ED - now without blood appreciated   - Hgb stable    - Abnormal UA  - Continue rocephin  - F/u urine and blood cultures   - F/u urology consult  - Will hold eliquis pending procedure if any    #Moderate to large bilateral pleural effusions  # Likely  Acute on chronic HFpEF   - As per CT abdomen and pelvis   - Will consult CTS to    - Hold eliquis for now   - BNP elevated  - Check echo  - Continue IV Lasix 40 mg QD  - Appears fluid overload   - Cardiology consult     #Hyponatremia   - hypervolemic hyponatremia   - He received 1L NS bolus in the ED  -  today  - Continue lasix   - F/U urine/serum studies   - Nephrology consult    #Afib   - Holding off on Eliquis for possible thoracentesis   - Continue metoprolol succinate 25 mg QD    # History of UC  - Continue sulfasalazine    # BPH  - Chronic robertson  - Will continue doxasozin till urology sees    #T2DM  - Continue lantus  - Continue insulin sliding scale    #DVT ppx   - Takes Eliquis 2.5 mg - will hold off for now  - SCDs     89 y/o M with a PMH of A-fib on Eliquis, GERD, hypertension, hyperlipidemia, CVA, diabetes, rectal CA with chronic diarrhea from enemas, atonic bladder with self-catheterizations, status post permanent pacemaker for bradycardia and syncope and persistent known moderate sized pericardial effusion presenting to  ED on 1/23/25 for suprapubic pain. He has a chronic Robertson that was placed in [facility] on 12/24/24. Robertson was functioning well until the morning of admission when the home health aide noted bright red blood in the bag. Patient also complaining of lower abdominal pain.  Patient is on daily Eliquis.  No fevers.  Normal p.o. intake.  No back pain.  Denies history of kidney stones. Prior to having an indwelling Robertson placed in December 2024 patient would self cath daily (6 times a day).    #Hematuria   #UTI   #Possible bladder mass  - Robertson changed in the ED - now without blood appreciated   - Hgb stable    - Abnormal UA  - Continue rocephin  - F/u urine and blood cultures   - F/u urology consult  - CT urogram   - Will hold eliquis pending procedure if any    #Moderate to large bilateral pleural effusions  # Likely  Acute on chronic HFpEF   - As per CT abdomen and pelvis   - Will consult CTS to    - Hold eliquis for now   - BNP elevated  - Check echo  - Continue IV Lasix 40 mg QD  - Appears fluid overload   - Cardiology consult     #Hyponatremia   - hypervolemic hyponatremia   - He received 1L NS bolus in the ED  -  today  - Continue lasix   - F/U urine/serum studies   - Nephrology consult    #Afib   - Holding off on Eliquis for possible thoracentesis   - Continue metoprolol succinate 25 mg QD    # History of UC  - Continue sulfasalazine    # BPH  - Chronic robertson  - Will continue doxasozin till urology sees    #T2DM  - Continue lantus  - Continue insulin sliding scale    #DVT ppx   - Takes Eliquis 2.5 mg - will hold off for now  - SCDs

## 2025-01-24 NOTE — DIETITIAN INITIAL EVALUATION ADULT - ADD RECOMMEND
1. Recommend to Liberalize diet to regular to maximize caloric and nutrient intake.   2. Add high-protein berry smoothie BID (370 kcal, 26g protein) and Premier protein shake 1x daily to optimize nutritional needs (provides 160 kcal, 30 g protein/ shake) - can d/c premier protein shake is pt does not like  3. MVI w/ minerals daily to ensure 100% RDA met  4. Consider adding thiamine 100 mg daily 2/2 poor PO intake/ malnutrition  5. Monitor bowel movements, if no BM for >3 days, consider implementing bowel regimen.  6. Monitor daily lytes/min and replete prn  7. Monitor and optimize BG levels between 140-180 mg/dL by medical management   8. Obtain weekly wt to track/trend changes  9. Encourage protein-rich foods, maximize food preferences  10. Consider adding appetite stimulant such as Remeron or Marinol 2/2 chronically poor appetite/ PO intake  11. Confirm goals of care regarding nutrition support. Nutrition support is not recommended due to overall declining medical status which evidenced based studies indicate EN is not effective in prolonging survival and improving quality of life. It can also increase risk of aspiration pneumonia as well as other related issues (infection, GI complications, and worsening/ non-healing PI's). However, will provide nutrition/ hydration within C.   RD will continue to monitor PO intake, labs, hydration, and wt prn.

## 2025-01-24 NOTE — H&P ADULT - NSHPPHYSICALEXAM_GEN_ALL_CORE
Vital Signs Last 24 Hrs  T(C): 37 (23 Jan 2025 21:14), Max: 37 (23 Jan 2025 21:14)  T(F): 98.6 (23 Jan 2025 21:14), Max: 98.6 (23 Jan 2025 21:14)  HR: 70 (23 Jan 2025 21:14) (70 - 82)  BP: 118/54 (23 Jan 2025 21:14) (118/54 - 136/78)  BP(mean): 87 (23 Jan 2025 18:37) (87 - 87)  RR: 18 (23 Jan 2025 21:14) (17 - 20)  SpO2: 96% (23 Jan 2025 21:14) (94% - 96%)    Parameters below as of 23 Jan 2025 21:14  Patient On (Oxygen Delivery Method): room air    GENERAL: lying in bed - appears fatigued  HEAD:  Atraumatic  EYES: EOMI  ENT: Moist mucous membranes  NECK: Supple  CHEST/LUNG: Unlabored respirations  HEART: Regular rate and rhythm  ABDOMEN: tenderness to palpation in the suprapubic region; Crump in place   EXTREMITIES:  1+ bilateral pitting edema in the lower extremities, bilateral arms and scrotum swollen as well  NERVOUS SYSTEM:  Alert & Oriented X3, speech clear. No deficits

## 2025-01-24 NOTE — DIETITIAN INITIAL EVALUATION ADULT - OTHER INFO
89 y/o M with a PMH of A-fib on Eliquis, GERD, hypertension, hyperlipidemia, CVA, diabetes, rectal CA with chronic diarrhea from enemas, atonic bladder with self-catheterizations, status post permanent pacemaker for bradycardia and syncope and persistent known moderate sized pericardial effusion presenting to  ED on 1/23/25 for suprapubic pain. He has a chronic Crump that was placed in [facility] on 12/24/24. Crump was functioning well until the morning of admission when the home health aide noted bright red blood in the bag. Patient also complaining of lower abdominal pain.  Patient is on daily Eliquis. Noted with hematuria, UTI and possible bladder mass? As per CT abdomen and pelvis from admission - moderate to large bilateral pleural effusions; consult CTS. HypoNa - appears hypervolemic hypoNa. Admitting diagnosis: hematuria, hyponatremia, bladder mass    Home health aide at bedside w/ pt during RD visit. Aide helping with feeding pt. Breakfast tray observed by RD - rice krispies, yogurt, toast, juice and milk. Pt reports last time he weighed himself he was ~190#; reports "pants feel tighter" ? weight gain. RD obtained bedscale wt 193# on 1/24/25 - 1+ gen edema doc'd skewing wt. Pt appears thin and bony; NFPE reveals mod-sev muscle/fat wasting. Recommend to liberalize diet to regular to maximize caloric and nutrient intake. Will add high-protein berry smoothie BID (370 kcal, 26g protein). Pt agreeable to trial Premier protein shake 1x to optimize nutritional needs (provides 160 kcal, 30 g protein/ shake); if pt does not like can d/c. Consider adding appetite stimulant such as Remeron or Marinol 2/2 chronically poor appetite/ PO intake Per aide pt does not consume much protein daily. Confirm goals of care regarding nutrition support. Nutrition support is not recommended due to overall declining medical status which evidenced based studies indicate EN is not effective in prolonging survival and improving quality of life. It can also increase risk of aspiration pneumonia as well as other related issues (infection, GI complications, and worsening/ non-healing PI's). However, will provide nutrition/ hydration within GOC. Please see additional recommendations below.

## 2025-01-24 NOTE — DIETITIAN INITIAL EVALUATION ADULT - PERTINENT MEDS FT
MEDICATIONS  (STANDING):  atorvastatin 80 milliGRAM(s) Oral at bedtime  cefTRIAXone Injectable. 1000 milliGRAM(s) IV Push every 24 hours  dextrose 5%. 1000 milliLiter(s) (50 mL/Hr) IV Continuous <Continuous>  dextrose 5%. 1000 milliLiter(s) (100 mL/Hr) IV Continuous <Continuous>  dextrose 50% Injectable 25 Gram(s) IV Push once  dextrose 50% Injectable 12.5 Gram(s) IV Push once  dextrose 50% Injectable 25 Gram(s) IV Push once  doxazosin 4 milliGRAM(s) Oral at bedtime  fluticasone propionate/ salmeterol 100-50 MICROgram(s) Diskus 1 Dose(s) Inhalation two times a day  furosemide   Injectable 40 milliGRAM(s) IV Push daily  glucagon  Injectable 1 milliGRAM(s) IntraMuscular once  insulin glargine Injectable (LANTUS) 20 Unit(s) SubCutaneous at bedtime  insulin lispro (ADMELOG) corrective regimen sliding scale   SubCutaneous three times a day before meals  insulin lispro (ADMELOG) corrective regimen sliding scale   SubCutaneous at bedtime  ketorolac   Injectable 15 milliGRAM(s) IV Push once  sulfaSALAzine 500 milliGRAM(s) Oral daily    MEDICATIONS  (PRN):  acetaminophen     Tablet .. 650 milliGRAM(s) Oral every 6 hours PRN Mild Pain (1 - 3)  dextrose Oral Gel 15 Gram(s) Oral once PRN Blood Glucose LESS THAN 70 milliGRAM(s)/deciliter  melatonin 3 milliGRAM(s) Oral at bedtime PRN Insomnia  ondansetron Injectable 4 milliGRAM(s) IV Push every 6 hours PRN Nausea and/or Vomiting

## 2025-01-24 NOTE — DIETITIAN INITIAL EVALUATION ADULT - PERTINENT LABORATORY DATA
01-24    124[L]  |  95[L]  |  12  ----------------------------<  107[H]  4.6   |  27  |  0.77    Ca    9.4      24 Jan 2025 06:37  Phos  3.2     01-24  Mg     1.8     01-24    TPro  6.0  /  Alb  2.6[L]  /  TBili  0.7  /  DBili  x   /  AST  23  /  ALT  9[L]  /  AlkPhos  78  01-23  POCT Blood Glucose.: 125 mg/dL (01-24-25 @ 09:04)

## 2025-01-24 NOTE — CONSULT NOTE ADULT - PROBLEM SELECTOR RECOMMENDATION 9
chronic HF (unknown EF), with recent pericardial effusion s/.p pericardiocentesis and pleural effusion s/p thoracentesis at [facility] in 10/24, now with pericardial and B/L pleural effusion left>right   Recommendation: check Echo, start lasix 40 mg ivp daily, start BB at home dose toprol xl 25 mg po daily,  CT Sx consult appreciated - cont. diuresis, will monitor with cxr, daily weight
history of pleural effusion requiring thoracentesis in the past   history of pericardial effusion   history of heart failure   would continue diuresis as tolerated   baseline cxr ordered - will repeat and trend over the weekend   [facility] and [facility] conversations ongoing with primary team   will continue to peripherally follow with you   nothing to do in the acute setting   please call for acute changes or concerns

## 2025-01-24 NOTE — DIETITIAN INITIAL EVALUATION ADULT - ORAL NUTRITION SUPPLEMENTS
Add high-protein berry smoothie BID (370 kcal, 26g protein)   Premier protein shake 1x daily to optimize nutritional needs (provides 160 kcal, 30 g protein/ shake)

## 2025-01-24 NOTE — DIETITIAN INITIAL EVALUATION ADULT - NSFNSGIIOFT_GEN_A_CORE
I&O's Detail    23 Jan 2025 07:01  -  24 Jan 2025 07:00  --------------------------------------------------------  IN:    Continuous Bladder Irrigation (mL): 03167 mL  Total IN: 24177 mL    OUT:    Continuous Bladder Irrigation (mL): 60831 mL    Indwelling Catheter - Urethral (mL): 2000 mL  Total OUT: 01774 mL    Total NET: -7800 mL      24 Jan 2025 07:01  -  24 Jan 2025 10:39  --------------------------------------------------------  IN:    Continuous Bladder Irrigation (mL): 6000 mL  Total IN: 6000 mL    OUT:    Continuous Bladder Irrigation (mL): 8550 mL  Total OUT: 8550 mL    Total NET: -2550 mL

## 2025-01-24 NOTE — CONSULT NOTE ADULT - PROBLEM SELECTOR RECOMMENDATION 2
pt; with recent pericardial effusion s/.p pericardiocentesis on 10/24 at [facility], CT shows pericardial effusion and cardiomegaly, awaiting Echo

## 2025-01-24 NOTE — CONSULT NOTE ADULT - ASSESSMENT
89 y/o M with a PMH of A-fib on Eliquis, GERD, hypertension, hyperlipidemia, CVA, diabetes, rectal CA with chronic diarrhea from enemas, atonic bladder with self-catheterizations, status post permanent pacemaker for bradycardia and syncope and persistent known moderate sized pericardial effusion presenting to  ED on 1/23/25 for suprapubic pain.   noted with hyponatremia likely depletional  b/l pleural effusion     REC  - urine osm, serum osm   - serum electrolytes  - with dec of na with lasix 40 iv started, will hold dose and fu the serum and urine  studies send   - CTS input noted: intervention planned,   - no need for FR, pt consuming minimal po intake  - inc intake encourage, refuses any  nutritional supplements     89 y/o M with a PMH of A-fib on Eliquis, GERD, hypertension, hyperlipidemia, CVA, diabetes, rectal CA with chronic diarrhea from enemas, atonic bladder with self-catheterizations, status post permanent pacemaker for bradycardia and syncope and persistent known moderate sized pericardial effusion presenting to  ED on 1/23/25 for suprapubic pain.   noted with hyponatremia likely depletional  b/l pleural effusion   suprapubic pain with hematuria     REC  - urine osm, serum osm   - serum electrolytes  - with dec of na with lasix 40 iv started, will hold dose and fu the serum and urine  studies send   - CTS input noted: intervention planned,   - no need for FR, pt consuming minimal po intake  - inc intake encourage, refuses any  nutritional supplements   - CBI in progress   - on ctx fu ucx

## 2025-01-24 NOTE — H&P ADULT - ASSESSMENT
#Hematuria   #UTI   #Possible bladder mass?  - Crump changed in the ED - now without blood appreciated - no clots   - Hgb stable    - UA with large LE, positive nitrite, TNTC WBC TNTC RBC and moderate bacteria  - s/p IV CFTX in the ED - will continue   - F/u urine and blood cultures   - F/u urology consult     #Moderate to large bilateral pleural effusions   - As per CT abdomen and pelvis from admission   - Will consult CTS to see if thoracentesis can be done for symptomatic relief   - Will hold off on additional Eliquis in case procedure planned   - F/u on pro-BNP and TTE as well   - Will start IV Lasix 40 mg QD     #Hyponatremia   - Appears to be hypervolemic hyponatremia   - He received 1L NS bolus in the ED - do not give additional fluids   - Upon admission Na 125   - Will start Lasix IV 40 mg QD as he is clinically fluid overloaded   - F/u AM BMP     #Afib   - Holding off on Eliquis   - Continue metoprolol succinate 25 mg QD    #T2DM  - At home is on Lantus 20 units at bedtime - will continue   - Continue insulin sliding scale    #Protein Calorie Malnutrition   - Albumin upon admission 2.6   - F/u nutrition consult     #DVT ppx   - Takes Eliquis 2.5 mg - will hold off for now  - SCDs

## 2025-01-24 NOTE — H&P ADULT - HISTORY OF PRESENT ILLNESS
Patient is an 89 y/o M with a PMH of A-fib on Eliquis, GERD, hypertension, hyperlipidemia, CVA, diabetes, rectal CA with chronic diarrhea from enemas, atonic bladder with self-catheterizations, status post permanent pacemaker for bradycardia and syncope and persistent known moderate sized pericardial effusion presenting to  ED on 1/23/25 for suprapubic pain.     He has a chronic Crump that was placed in [facility] on 12/24/24. Crump was functioning well until the morning of admission when the home health aide noted bright red blood in the bag. Patient also complaining of lower abdominal pain.  Patient is on daily Eliquis.  No fevers.  Normal p.o. intake.  No back pain.  Denies history of kidney stones.      Prior to having an indwelling Crump placed in December 2024 patient would self cath daily (6 times a day).       Upon arrival to the ED, vitals were /78 HR 82 RR 20 T97.1F and SpO2 of 96% on room air. Labs significant for Na 125, UA with large LE, positive nitrite, TNTC WBC TNTC RBC and moderate bacteria. CT abdomen and pelvis without contrast showed bilateral pleural effusions. Pericardial effusion. Cardiomegaly.Bilateral renal cysts, some of which with rim calcification. Right renal mass. Also bladder clot, cannot exclude mass. Recommend CT urogram if feasible. He received 1L NS bolus, IV Zofran 4mg x 1, IV Tylenol 1g x 1 and IV CFTX 1g x 1. Crump replaced in the ED and large amount of small clots removed. Subsequently, Crump draining clear fluid without issues.     Additional history obtained from home health aide present at bedside. She mentions he was admitted to [facility] in October 2024. There were bilateral pleural effusions. Patient was admitted to [facility] and started on IV diuretics. He then underwent pericardial window and thoracentesis. Post operative course complicated by abdominal pain and distention patient developed [place]'s syndrome. Patient was treated with the decompression colonoscopy.     Aide mentions he appears much more fluid overloaded than his baseline. Noticeable in his bilateral lower extremities, bilateral arms and scrotum. She also mentions he is bedbound but appears more fatigued and short of breath at times. Samanta says she was instructed by patient's daughter to hold off on his home Lasix dose today because his cardiologist said his Na level is low. He usually takes furosemide 20 mg BID. Last dose of furosemide was Wednesday (1/22/24) AM.     Currently, patient is reporting some suprapubic pain. He received IV Tylenol in the ED. Family is very cautious about using opiates because of his history of constipation. He requires a daily enema in the AM to have a bowel movement.

## 2025-01-24 NOTE — CONSULT NOTE ADULT - ASSESSMENT
89 Y/O M with Hematuria and UTI, Possible bladder mass  - S/P CBI, Urine Clear  - C/W Hold CBI, if any darker hematuria then resume CBI on slow drip titrate to clear light pink tinge, hand irrigate PRN- If patient leaks around catheter, clots in drainage tube, catheter not draining or if patient complains of abdominal pain.   - F/U CT Urogram  - D/W Dr. Nogueira    40 minutes spent on total encounter. The necessity of the time spent during the encounter on this date of service was due to: medical decision making, plan of care, reviewing data, discussing goals of care with patient/family, and writing this note.

## 2025-01-24 NOTE — CONSULT NOTE ADULT - CONVERSATION DETAILS
Team met with pt. at bedside. Pt. appeared to have simple capacity. He requested team speak with his daughter Salvador. We spoke with pts daughter via phone to discuss goals of care, assist with planning and provide supportive counseling. Pt. is home with 2 24/7 private hire aides. Pts daughter reports pt. needs help with all ADLs and can only get out of bed with assistance. She shared his History since with coming back and forth to the hospital due to being swollen. She shared he gets Lasix and then that causes other complications therefore, he keeps experiencing the same cycle. Pt. also has history of rectal CA as per daughter. Feelings explored and support provided.    Goals of care discussed. We discussed the option of comfort focus and Hospice if pt. is at the point that he doesn't want further testing etc. Pts daughter shared that they are familiar with Hospice at home form their Mother. She reports she will speak with her brother and pt. this weekend and they will decide. We also reviewed pts wishes regarding resuscitation and intubation. Pts daughter shared that she does not think pt. wants these things but will speak with him and her family this weekend as well. She is aware that pt. remains full code at this time.     Plan to continue with current medical management. No limits currently set but family to discuss with pt. this weekend. Our team will have a follow up discussion on Monday. Emotional support provided. Our team will continue to follow.

## 2025-01-24 NOTE — PHARMACOTHERAPY INTERVENTION NOTE - COMMENTS
Medication reconciliation completed.  Patient was unable to provide medication information, spoke to daughter Ewelina at bedside and they provided current medication list; confirmed with Dr. [facility].

## 2025-01-24 NOTE — PROGRESS NOTE ADULT - SUBJECTIVE AND OBJECTIVE BOX
HOSPITALIST ATTENDING PROGRESS NOTE    Chart and meds reviewed.  Patient seen and examined.    CC: UTI    Subjective: No acute issues overnight, denies chest pain or sob.     All other systems reviewed and found to be negative with the exception of what has been described above.    MEDICATIONS  (STANDING):  atorvastatin 80 milliGRAM(s) Oral at bedtime  cefTRIAXone Injectable. 1000 milliGRAM(s) IV Push every 24 hours  dextrose 5%. 1000 milliLiter(s) (50 mL/Hr) IV Continuous <Continuous>  dextrose 5%. 1000 milliLiter(s) (100 mL/Hr) IV Continuous <Continuous>  dextrose 50% Injectable 25 Gram(s) IV Push once  dextrose 50% Injectable 12.5 Gram(s) IV Push once  dextrose 50% Injectable 25 Gram(s) IV Push once  doxazosin 4 milliGRAM(s) Oral at bedtime  fluticasone propionate/ salmeterol 100-50 MICROgram(s) Diskus 1 Dose(s) Inhalation two times a day  furosemide   Injectable 40 milliGRAM(s) IV Push daily  glucagon  Injectable 1 milliGRAM(s) IntraMuscular once  insulin glargine Injectable (LANTUS) 20 Unit(s) SubCutaneous at bedtime  insulin lispro (ADMELOG) corrective regimen sliding scale   SubCutaneous three times a day before meals  insulin lispro (ADMELOG) corrective regimen sliding scale   SubCutaneous at bedtime  ketorolac   Injectable 15 milliGRAM(s) IV Push once  sulfaSALAzine 500 milliGRAM(s) Oral daily    MEDICATIONS  (PRN):  acetaminophen     Tablet .. 650 milliGRAM(s) Oral every 6 hours PRN Mild Pain (1 - 3)  dextrose Oral Gel 15 Gram(s) Oral once PRN Blood Glucose LESS THAN 70 milliGRAM(s)/deciliter  melatonin 3 milliGRAM(s) Oral at bedtime PRN Insomnia  ondansetron Injectable 4 milliGRAM(s) IV Push every 6 hours PRN Nausea and/or Vomiting      Vital Signs Last 24 Hrs  T(C): 37 (23 Jan 2025 21:14), Max: 37 (23 Jan 2025 21:14)  T(F): 98.6 (23 Jan 2025 21:14), Max: 98.6 (23 Jan 2025 21:14)  HR: 81 (24 Jan 2025 08:22) (70 - 82)  BP: 118/54 (23 Jan 2025 21:14) (118/54 - 136/78)  BP(mean): 87 (23 Jan 2025 18:37) (87 - 87)  RR: 18 (23 Jan 2025 21:14) (17 - 20)  SpO2: 92% (24 Jan 2025 08:22) (92% - 96%)    Parameters below as of 24 Jan 2025 08:22  Patient On (Oxygen Delivery Method): room air    GEN: NAD  HEENT:  pupils equal and reactive, EOMI, no oropharyngeal lesions, erythema, exudates, oral thrush  NECK:   supple, no carotid bruits  CV:  +S1, +S2, regular, no murmurs  RESP:   lungs clear to auscultation bilaterally, no wheezing, rales, rhonchi, good air entry bilaterally  GI:  abdomen soft, non-tender, non-distended, normal BS  EXT:  no clubbing, no cyanosis, no edema, no calf pain, swelling or erythema  NEURO:  AAOX3, no focal neurological deficits, follows all commands, able to move extremities spontaneously  SKIN:  no ulcers, lesions or rashes    LABS:                          11.8   7.11  )-----------( 136      ( 24 Jan 2025 07:53 )             35.9     01-24    124[L]  |  95[L]  |  12  ----------------------------<  107[H]  4.6   |  27  |  0.77    Ca    9.4      24 Jan 2025 06:37  Phos  3.2     01-24  Mg     1.8     01-24    TPro  6.0  /  Alb  2.6[L]  /  TBili  0.7  /  DBili  x   /  AST  23  /  ALT  9[L]  /  AlkPhos  78  01-23    LIVER FUNCTIONS - ( 23 Jan 2025 14:45 )  Alb: 2.6 g/dL / Pro: 6.0 gm/dL / ALK PHOS: 78 U/L / ALT: 9 U/L / AST: 23 U/L / GGT: x           PT/INR - ( 23 Jan 2025 14:45 )   PT: 14.6 sec;   INR: 1.24 ratio    PTT - ( 23 Jan 2025 14:45 )  PTT:32.5 sec    Urinalysis Basic - ( 24 Jan 2025 06:37 )  Color: x / Appearance: x / SG: x / pH: x  Gluc: 107 mg/dL / Ketone: x  / Bili: x / Urobili: x   Blood: x / Protein: x / Nitrite: x   Leuk Esterase: x / RBC: x / WBC x   Sq Epi: x / Non Sq Epi: x / Bacteria: x      CT Abdomen and Pelvis No Cont (01.23.25 @ 16:23) >  IMPRESSION:  Bilateral pleural effusions.  Pericardial effusion. Cardiomegaly.  Bilateral renal cysts, some of which with rim calcification. Right renal   mass. Also bladder clot, cannot exclude mass. Recommend CT urogram if   feasible.  Diverticulosis.  Extensive atherosclerosis.

## 2025-01-24 NOTE — GOALS OF CARE CONVERSATION - ADVANCED CARE PLANNING - CONVERSATION DETAILS
HPI:  Patient is an 89 y/o M with a PMH of A-fib on Eliquis, GERD, hypertension, hyperlipidemia, CVA, diabetes, rectal CA with chronic diarrhea from enemas, atonic bladder with self-catheterizations, status post permanent pacemaker for bradycardia and syncope and persistent known moderate sized pericardial effusion presenting to  ED on 1/23/25 for suprapubic pain.    (24 Jan 2025 01:02)      PERTINENT PMH REVIEWED:  [ X ] YES [ ] NO           Primary Contact: Daughter Lilibeth - Pt. reports she is HCP but not on file    HCP [  ] Surrogate [  X ] Guardian [   ]    Mental Status: Pt. appears to have Simple Capacity He gave team permission to speak with his daughter Lilibeth  Concerns of Depression [  ]- Not identified   Anxiety [   ]- Not identified   Baseline ADLs (prior to admission):  Independent [ ] moderately [ ] fully   Dependent   [ ] moderately [ X ] fully    Anticipated Grief: Patient [  ] Family [  X  ]    Caregiver [place] Assessed: Yes [ X ] No [  ]    Confucianism: Confucianist     Spiritual Concerns: Not identified,  remains available     Goals of Care: Comfort [  ] Rehabilitation [  ] Curative [  ] Life Prolonging [  ]    Previous Services: Private hire 24/7 aides    ADVANCE DIRECTIVES:      Anticipated D/C Plan:                     Summary: HPI:  Patient is an 89 y/o M with a PMH of A-fib on Eliquis, GERD, hypertension, hyperlipidemia, CVA, diabetes, rectal CA with chronic diarrhea from enemas, atonic bladder with self-catheterizations, status post permanent pacemaker for bradycardia and syncope and persistent known moderate sized pericardial effusion presenting to  ED on 1/23/25 for suprapubic pain.    (24 Jan 2025 01:02)      PERTINENT PMH REVIEWED:  [ X ] YES [ ] NO           Primary Contact: Daughter Lilibeth - Pt. reports she is HCP but not on file    HCP [  ] Surrogate [  X ] Guardian [   ]    Mental Status: Pt. appears to have Simple Capacity He gave team permission to speak with his daughter Lilibeth  Concerns of Depression [  ]- Not identified   Anxiety [   ]- Not identified   Baseline ADLs (prior to admission):  Independent [ ] moderately [ ] fully   Dependent   [ ] moderately [ X ] fully    Anticipated Grief: Patient [  ] Family [  X  ]    Caregiver [place] Assessed: Yes [ X ] No [  ]    Yazdanism: Mormon     Spiritual Concerns: Not identified,  remains available     Goals of Care: Comfort [  ] Rehabilitation [  ] Curative [  ] Life Prolonging [  ]    Previous Services: Private hire 24/7 aides 2 aides     ADVANCE DIRECTIVES:      Anticipated D/C Plan:                     Summary:    Team met with pt. at bedside. Pt. appeared to have simple capacity. He requested team speak with his daughter Salvador. We spoke with pts daughter via phone to discuss goals of care, assist with planning and provide supportive counseling. Pt. is home with 2 24/7 private hire aides. Pts daughter reports pt. needs help with all ADLs and can only get out of bed with assistance. She shared his History since with coming back and forth to the hospital due to being swollen. She shared he gets Lasix and then that causes other complications therefore, he keeps experiencing the same cycle. Pt. also has history of rectal CA as per daughter. Feelings explored and support provided.    Goals of care discussed. We discussed the option of comfort focus and Hospice if pt. is at the point that he doesn't want further testing etc. Pts daughter shared that they are familiar with Hospice at home form their Mother. She reports she will speak with her brother and pt. this weekend and they will decide. We also reviewed pts wishes regarding resuscitation and intubation. Pts daughter shared that she does not think pt. wants these things but will speak with him and her family this weekend as well. She is aware that pt. remains full code at this time.     Plan to continue with current medical management. No limits currently set but family to discuss with pt. this weekend. Our team will have a follow up discussion on Monday. Emotional support provided. Our team will continue to follow.

## 2025-01-24 NOTE — CONSULT NOTE ADULT - ASSESSMENT
HPI: Pt is a 89y old Male with hx  of fib on Eliquis, GERD, hypertension, hyperlipidemia, CVA, diabetes, rectal CA with chronic diarrhea from enemas, atonic bladder with self-catheterizations, status post permanent pacemaker for bradycardia and syncope and persistent known moderate sized pericardial effusion presenting to  ED on 1/23/25 for suprapubic pain. He has a chronic Crump that was placed in [facility] on 12/24/24. Crump was functioning well until the morning of admission when the home health aide noted bright red blood in the bag. Patient also complaining of lower abdominal pain.  Patient is on daily Eliquis.  Prior to having an indwelling Crump placed in December 2024 patient would self cath daily (6 times a day). CT abd without contrast showed bilateral pleural effusions. Pericardial effusion. Cardiomegaly .Bilateral renal cysts, some of which with rim calcification. Right renal mass. Also bladder clot, cannot exclude mass. Additional history obtained from home health aide present at bedside. She mentions he was admitted to [facility] in October 2024. There were bilateral pleural effusions. Patient was admitted to [facility] and started on IV diuretics. He then underwent pericardial window and thoracentesis. Post operative course complicated by abdominal pain and distention patient developed Indiana's syndrome. Patient was treated with the decompression colonoscopy.   Patient is reporting some suprapubic pain. He received IV Tylenol in the ED. Family is very cautious about using opiates because of his history of constipation. He requires a daily enema in the AM to have a bowel movement.   Palliative medicine Consult to further establish GOC  1/24/25 Seen and examined at bedside. Currently denies pain or dyspnea. Able to provide some medical hx although limited    Assessment and Plan:    1) Hematuria  - UTI  -CBI in progress  -Crump cath changed  -Ct abd noted  -Urology eval pending  -Eliquis on hold as per med    2) Moderate to large bilateral pleural effusions   - As per CT abdomen and pelvis from admission   - Consult thoracic surg pending   - IV Lasix 40 mg QD as per med      3) Debility  -Immobility  -S/P CVA  -Poor PO intake  -Mod protein manan malnutrition  -PT eval  -Nutrition eval    4) Advanced Directives  -Pt with limited capacity  -States he has a HCP naming daughter Lilibeth  -Will call daughter to discuss GOC further    Time Spent: 75 minutes including the care, coordination and counseling of this patient, 50% of which was spent coordinating and counseling.       HPI: Pt is a 89y old Male with hx  of fib on Eliquis, GERD, hypertension, hyperlipidemia, CVA, diabetes, rectal CA with chronic diarrhea from enemas, atonic bladder with self-catheterizations, status post permanent pacemaker for bradycardia and syncope and persistent known moderate sized pericardial effusion presenting to  ED on 1/23/25 for suprapubic pain. He has a chronic Crump that was placed in [facility] on 12/24/24. Crump was functioning well until the morning of admission when the home health aide noted bright red blood in the bag. Patient also complaining of lower abdominal pain.  Patient is on daily Eliquis.  Prior to having an indwelling Crump placed in December 2024 patient would self cath daily (6 times a day). CT abd without contrast showed bilateral pleural effusions. Pericardial effusion. Cardiomegaly .Bilateral renal cysts, some of which with rim calcification. Right renal mass. Also bladder clot, cannot exclude mass. Additional history obtained from home health aide present at bedside. She mentions he was admitted to [facility] in October 2024. There were bilateral pleural effusions. Patient was admitted to [facility] and started on IV diuretics. He then underwent pericardial window and thoracentesis. Post operative course complicated by abdominal pain and distention patient developed Indiana's syndrome. Patient was treated with the decompression colonoscopy.   Patient is reporting some suprapubic pain. He received IV Tylenol in the ED. Family is very cautious about using opiates because of his history of constipation. He requires a daily enema in the AM to have a bowel movement.   Palliative medicine Consult to further establish [facility]  1/24/25 Seen and examined at bedside. Currently denies pain or dyspnea. Able to provide some medical hx although limited    Assessment and Plan:    1) Hematuria  - UTI  -CBI in progress  -Crump cath changed  -Ct abd noted  -Urology eval pending  -Eliquis on hold as per med    2) Moderate to large bilateral pleural effusions   - As per CT abdomen and pelvis from admission   - Consult thoracic surg pending   - IV Lasix 40 mg QD as per med      3) Debility  -Immobility  -S/P CVA  -Poor PO intake  -Mod protein manan malnutrition  -PT eval  -Nutrition eval    4) Advanced Directives  -Pt with limited capacity  -States he has a HCP naming daughter Lilibeth  -[facility] discussion with daughter today  -Will follow    Time Spent: 75 minutes including the care, coordination and counseling of this patient, 50% of which was spent coordinating and counseling.

## 2025-01-24 NOTE — DIETITIAN INITIAL EVALUATION ADULT - ETIOLOGY
r/t decreased ability to meet increased nutrient needs 2/2 hematuria and ? bladder mass on advanced age

## 2025-01-24 NOTE — CONSULT NOTE ADULT - ASSESSMENT
89 y/o M with a PMH of A-fib on Eliquis, GERD, hypertension, hyperlipidemia, CVA, diabetes, rectal CA with chronic diarrhea from enemas, atonic bladder with self-catheterizations, status post permanent pacemaker for bradycardia and syncope and persistent known moderate sized pericardial effusion presenting to  ED on 1/23/25 for suprapubic pain. He has a chronic Crump that was placed in [facility] on 12/24/24. Crump was functioning well until the morning of admission when the home health aide noted bright red blood in the bag. Patient also complaining of lower abdominal pain.    Since found to have pleural effusion on work up   recent history of note is patient had admission to [facility] in october requiring thoracentesis and pericardial centesis as per documentation with concern for tamponade at the time   since admitted in december with increased shortness of breath in ED with pleural effusion on CXR - IV diuretic given and patient sent home with outpatient follow up for which his diuretics where increased outpatient     consulted for pleural effusion also with pericardial effusion on CT scan

## 2025-01-25 LAB
ALBUMIN SERPL ELPH-MCNC: 2.1 G/DL — LOW (ref 3.3–5)
ALP SERPL-CCNC: 71 U/L — SIGNIFICANT CHANGE UP (ref 40–120)
ALT FLD-CCNC: 8 U/L — LOW (ref 12–78)
ANION GAP SERPL CALC-SCNC: 3 MMOL/L — LOW (ref 5–17)
AST SERPL-CCNC: 17 U/L — SIGNIFICANT CHANGE UP (ref 15–37)
BILIRUB SERPL-MCNC: 0.4 MG/DL — SIGNIFICANT CHANGE UP (ref 0.2–1.2)
BUN SERPL-MCNC: 16 MG/DL — SIGNIFICANT CHANGE UP (ref 7–23)
CALCIUM SERPL-MCNC: 9.6 MG/DL — SIGNIFICANT CHANGE UP (ref 8.5–10.1)
CHLORIDE SERPL-SCNC: 94 MMOL/L — LOW (ref 96–108)
CO2 SERPL-SCNC: 27 MMOL/L — SIGNIFICANT CHANGE UP (ref 22–31)
CREAT SERPL-MCNC: 0.86 MG/DL — SIGNIFICANT CHANGE UP (ref 0.5–1.3)
EGFR: 83 ML/MIN/1.73M2 — SIGNIFICANT CHANGE UP
GLUCOSE BLDC GLUCOMTR-MCNC: 104 MG/DL — HIGH (ref 70–99)
GLUCOSE BLDC GLUCOMTR-MCNC: 105 MG/DL — HIGH (ref 70–99)
GLUCOSE BLDC GLUCOMTR-MCNC: 106 MG/DL — HIGH (ref 70–99)
GLUCOSE BLDC GLUCOMTR-MCNC: 110 MG/DL — HIGH (ref 70–99)
GLUCOSE SERPL-MCNC: 103 MG/DL — HIGH (ref 70–99)
HCT VFR BLD CALC: 39 % — SIGNIFICANT CHANGE UP (ref 39–50)
HGB BLD-MCNC: 12.5 G/DL — LOW (ref 13–17)
MCHC RBC-ENTMCNC: 29.6 PG — SIGNIFICANT CHANGE UP (ref 27–34)
MCHC RBC-ENTMCNC: 32.1 G/DL — SIGNIFICANT CHANGE UP (ref 32–36)
MCV RBC AUTO: 92.4 FL — SIGNIFICANT CHANGE UP (ref 80–100)
OSMOLALITY SERPL: 270 MOSMOL/KG — LOW (ref 280–301)
PLATELET # BLD AUTO: 106 K/UL — LOW (ref 150–400)
POTASSIUM SERPL-MCNC: 4.8 MMOL/L — SIGNIFICANT CHANGE UP (ref 3.5–5.3)
POTASSIUM SERPL-SCNC: 4.8 MMOL/L — SIGNIFICANT CHANGE UP (ref 3.5–5.3)
PROT SERPL-MCNC: 5.6 GM/DL — LOW (ref 6–8.3)
RBC # BLD: 4.22 M/UL — SIGNIFICANT CHANGE UP (ref 4.2–5.8)
RBC # FLD: 15.2 % — HIGH (ref 10.3–14.5)
SODIUM SERPL-SCNC: 124 MMOL/L — LOW (ref 135–145)
WBC # BLD: 7.21 K/UL — SIGNIFICANT CHANGE UP (ref 3.8–10.5)
WBC # FLD AUTO: 7.21 K/UL — SIGNIFICANT CHANGE UP (ref 3.8–10.5)

## 2025-01-25 PROCEDURE — 99233 SBSQ HOSP IP/OBS HIGH 50: CPT

## 2025-01-25 PROCEDURE — 99233 SBSQ HOSP IP/OBS HIGH 50: CPT | Mod: FS

## 2025-01-25 PROCEDURE — 74178 CT ABD&PLV WO CNTR FLWD CNTR: CPT | Mod: 26

## 2025-01-25 PROCEDURE — 93288 INTERROG EVL PM/LDLS PM IP: CPT | Mod: 26

## 2025-01-25 PROCEDURE — 99232 SBSQ HOSP IP/OBS MODERATE 35: CPT

## 2025-01-25 PROCEDURE — 93971 EXTREMITY STUDY: CPT | Mod: 26,LT

## 2025-01-25 RX ORDER — CEFEPIME HCL 1 G
2000 IV SOLUTION, PIGGYBACK, BOTTLE (EA) INTRAVENOUS EVERY 12 HOURS
Refills: 0 | Status: DISCONTINUED | OUTPATIENT
Start: 2025-01-25 | End: 2025-01-26

## 2025-01-25 RX ORDER — APIXABAN 5 MG/1
2.5 TABLET, FILM COATED ORAL
Refills: 0 | Status: DISCONTINUED | OUTPATIENT
Start: 2025-01-25 | End: 2025-01-25

## 2025-01-25 RX ORDER — ALBUMIN HUMAN 50 G/1000ML
50 SOLUTION INTRAVENOUS EVERY 12 HOURS
Refills: 0 | Status: DISCONTINUED | OUTPATIENT
Start: 2025-01-25 | End: 2025-01-29

## 2025-01-25 RX ORDER — METOPROLOL SUCCINATE 25 MG
25 TABLET, EXTENDED RELEASE 24 HR ORAL DAILY
Refills: 0 | Status: DISCONTINUED | OUTPATIENT
Start: 2025-01-25 | End: 2025-01-29

## 2025-01-25 RX ORDER — CIPROFLOXACIN HCL 500 MG
500 TABLET ORAL EVERY 12 HOURS
Refills: 0 | Status: DISCONTINUED | OUTPATIENT
Start: 2025-01-25 | End: 2025-01-29

## 2025-01-25 RX ORDER — MEROPENEM 500 MG/20ML
1000 INJECTION INTRAVENOUS EVERY 8 HOURS
Refills: 0 | Status: DISCONTINUED | OUTPATIENT
Start: 2025-01-25 | End: 2025-01-25

## 2025-01-25 RX ORDER — BACTERIOSTATIC SODIUM CHLORIDE 0.9 %
1 VIAL (ML) INJECTION
Refills: 0 | Status: DISCONTINUED | OUTPATIENT
Start: 2025-01-25 | End: 2025-01-29

## 2025-01-25 RX ADMIN — Medication 2000 MILLIGRAM(S): at 21:34

## 2025-01-25 RX ADMIN — Medication 4 MILLIGRAM(S): at 21:34

## 2025-01-25 RX ADMIN — FLUTICASONE PROPIONATE AND SALMETEROL 1 DOSE(S): 113; 14 POWDER, METERED RESPIRATORY (INHALATION) at 08:44

## 2025-01-25 RX ADMIN — Medication 1 GRAM(S): at 21:34

## 2025-01-25 RX ADMIN — ALBUMIN HUMAN 50 MILLILITER(S): 50 SOLUTION INTRAVENOUS at 17:20

## 2025-01-25 RX ADMIN — ATORVASTATIN CALCIUM 80 MILLIGRAM(S): 80 TABLET, FILM COATED ORAL at 21:34

## 2025-01-25 RX ADMIN — Medication 40 MILLIGRAM(S): at 17:11

## 2025-01-25 RX ADMIN — FLUTICASONE PROPIONATE AND SALMETEROL 1 DOSE(S): 113; 14 POWDER, METERED RESPIRATORY (INHALATION) at 21:02

## 2025-01-25 RX ADMIN — Medication 40 MILLIGRAM(S): at 09:31

## 2025-01-25 RX ADMIN — Medication 500 MILLIGRAM(S): at 21:34

## 2025-01-25 RX ADMIN — ACETAMINOPHEN 650 MILLIGRAM(S): 160 SUSPENSION ORAL at 09:31

## 2025-01-25 RX ADMIN — Medication 500 MILLIGRAM(S): at 09:31

## 2025-01-25 RX ADMIN — ACETAMINOPHEN, DIPHENHYDRAMINE HCL, PHENYLEPHRINE HCL 3 MILLIGRAM(S): 325; 25; 5 TABLET ORAL at 21:44

## 2025-01-25 NOTE — PROGRESS NOTE ADULT - ASSESSMENT
89 y/o M with a PMH of A-fib on Eliquis, GERD, hypertension, hyperlipidemia, CVA, diabetes, rectal CA with chronic diarrhea from enemas, atonic bladder with self-catheterizations, status post permanent pacemaker for bradycardia and syncope and persistent known moderate sized pericardial effusion presenting to  ED on 1/23/25 for suprapubic pain.   noted with hyponatremia likely depletional  b/l pleural effusion   suprapubic pain with hematuria     REC  - urine osm, serum osm   - serum electrolytes  - with dec of na with lasix 40 iv started, will hold dose and fu the serum and urine  studies send   - CTS input noted: intervention planned,   - no need for FR, pt consuming minimal po intake  - inc intake encourage, refuses any  nutritional supplements   - CBI in progress   - on ctx fu ucx    1/25 SY    89 y/o M with a PMH of A-fib on Eliquis, GERD, hypertension, hyperlipidemia, CVA, diabetes, rectal CA with chronic diarrhea from enemas, atonic bladder with self-catheterizations, status post permanent pacemaker for bradycardia and syncope and persistent known moderate sized pericardial effusion presenting to  ED on 1/23/25 for suprapubic pain.   noted with hyponatremia likely depletional  b/l pleural effusion   suprapubic pain with hematuria     REC  - urine osm, serum osm   - serum electrolytes  - with dec of na with lasix 40 iv started, will hold dose and fu the serum and urine  studies send   - CTS input noted: intervention planned,   - no need for FR, pt consuming minimal po intake  - inc intake encourage, refuses any  nutritional supplements   - CBI in progress   - on ctx fu ucx    1/25 SY  --Hyponatremia in the setting of CHF decompensation and anasarca      Will need to continue diuresis with an attempt to stabilize serum sodium.     Add SPA for more effective diuresis     Restart NACL tabs 1 gm BID.  --UTI/hematuria : CBI per ,  Continue Ceftriaxone 1 gm daily.  --Bladder : clot vs mass :  follow up.  D/w Dr Kelley    D/w family.

## 2025-01-25 NOTE — PROGRESS NOTE ADULT - ASSESSMENT
89 y/o M with a PMH of A-fib on Eliquis, GERD, hypertension, hyperlipidemia, CVA, diabetes, rectal CA with chronic diarrhea from enemas, atonic bladder with self-catheterizations, status post permanent pacemaker for bradycardia and syncope and persistent known moderate sized pericardial effusion presenting to  ED on 1/23/25 for suprapubic pain. He has a chronic Robertson that was placed in [facility] on 12/24/24. Robertson was functioning well until the morning of admission when the home health aide noted bright red blood in the bag. Patient also complaining of lower abdominal pain.  Patient is on daily Eliquis.  No fevers.  Normal p.o. intake.  No back pain.  Denies history of kidney stones. Prior to having an indwelling Robertson placed in December 2024 patient would self cath daily (6 times a day).    #Hematuria   #UTI   #Possible bladder mass  - Robertson changed in the ED - now without blood appreciated   - Hgb stable    - Abnormal UA  - Continue rocephin  - F/u urine and blood cultures   - F/u urology consult  - CT urogram   - Will hold eliquis pending procedure if any    #Moderate to large bilateral pleural effusions  # Likely  Acute on chronic HFpEF   - As per CT abdomen and pelvis   - Will consult CTS to    - Hold eliquis for now   - BNP elevated  - Check echo  - Lasix IV daily   - Appears fluid overload   - Cardiology consult     #Hyponatremia   - hypervolemic hyponatremia   - He received 1L NS bolus in the ED  -  today  - Continue lasix as per cardio  - F/U urine/serum studies   - Nephrology consult appreciated    #Afib   - Holding off on Eliquis for possible thoracentesis   - Continue metoprolol succinate 25 mg QD    # History of UC  - Continue sulfasalazine    # BPH  - Chronic robertson  - Will continue doxasozin till urology sees    #T2DM  - Continue lantus  - Continue insulin sliding scale    #DVT ppx   - Takes Eliquis 2.5 mg - will hold off for now  - SCDs   89 y/o M with a PMH of A-fib on Eliquis, GERD, hypertension, hyperlipidemia, CVA, diabetes, rectal CA with chronic diarrhea from enemas, atonic bladder with self-catheterizations, status post permanent pacemaker for bradycardia and syncope and persistent known moderate sized pericardial effusion presenting to  ED on 1/23/25 for suprapubic pain. He has a chronic Robertson that was placed in [facility] on 12/24/24. Robertson was functioning well until the morning of admission when the home health aide noted bright red blood in the bag. Patient also complaining of lower abdominal pain.  Patient is on daily Eliquis.  No fevers.  Normal p.o. intake.  No back pain.  Denies history of kidney stones. Prior to having an indwelling Robertson placed in December 2024 patient would self cath daily (6 times a day).    #Hematuria   #UTI   #Possible bladder mass  - Robertson changed in the ED - now without blood appreciated   - Hgb stable    - Abnormal UA  - Continue rocephin  - F/u urine and blood cultures   - F/u urology consult  - CT urogram   - Will hold eliquis pending procedure if any    #Moderate to large bilateral pleural effusions  # Likely  Acute on chronic HFpEF   - As per CT abdomen and pelvis   - Will consult CTS to    - Hold eliquis for now   - BNP elevated  - Check echo  - Lasix IV daily   - Appears fluid overload   - Cardiology consult     #Hyponatremia   - hypervolemic hyponatremia   - He received 1L NS bolus in the ED  -  today  - Continue lasix as per cardio  - F/U urine/serum studies   - Nephrology consult appreciated    # LUE swelling  - Check doppler     #Afib   - Holding off on Eliquis for possible thoracentesis   - Continue metoprolol succinate 25 mg QD    # History of UC  - Continue sulfasalazine    # BPH  - Chronic robertson  - Will continue doxasozin till urology sees    #T2DM  - Continue lantus  - Continue insulin sliding scale    #DVT ppx   - Takes Eliquis 2.5 mg - will hold off for now  - SCDs   89 y/o M with a PMH of A-fib on Eliquis, GERD, hypertension, hyperlipidemia, CVA, diabetes, rectal CA with chronic diarrhea from enemas, atonic bladder with self-catheterizations, status post permanent pacemaker for bradycardia and syncope and persistent known moderate sized pericardial effusion presenting to  ED on 1/23/25 for suprapubic pain. He has a chronic Robertson that was placed in [facility] on 12/24/24. Robertson was functioning well until the morning of admission when the home health aide noted bright red blood in the bag. Patient also complaining of lower abdominal pain.  Patient is on daily Eliquis.  No fevers.  Normal p.o. intake.  No back pain.  Denies history of kidney stones. Prior to having an indwelling Robertson placed in December 2024 patient would self cath daily (6 times a day).    #Hematuria   #UTI   #Possible bladder mass  - Robertson changed in the ED - now without blood appreciated   - Hgb stable    - Abnormal UA  - Continue rocephin  - F/u urine and blood cultures   - F/u urology consult  - CT urogram   - Will hold eliquis pending procedure if any    #Moderate to large bilateral pleural effusions  # Likely  Acute on chronic HFpEF   - As per CT abdomen and pelvis   - Will consult CTS to    - Hold eliquis for now   - BNP elevated  - Check echo  - Lasix IV daily   - Appears fluid overload   - Cardiology consult     #Hyponatremia   - hypervolemic hyponatremia   - He received 1L NS bolus in the ED  -  today  - Continue lasix as per cardio  - F/U urine/serum studies   - Nephrology consult appreciated    # LUE swelling  - Doppler negative  - Likely infiltrate     #Afib   - Holding off on Eliquis for possible thoracentesis   - Continue metoprolol succinate 25 mg QD    # History of UC  - Continue sulfasalazine    # BPH  - Chronic robertson  - Will continue doxasozin till urology sees    #T2DM  - Continue lantus  - Continue insulin sliding scale    #DVT ppx   - Takes Eliquis 2.5 mg - will hold off for now  - SCDs   89 y/o M with a PMH of A-fib on Eliquis, GERD, hypertension, hyperlipidemia, CVA, diabetes, rectal CA with chronic diarrhea from enemas, atonic bladder with self-catheterizations, status post permanent pacemaker for bradycardia and syncope and persistent known moderate sized pericardial effusion presenting to  ED on 1/23/25 for suprapubic pain. He has a chronic Robertson that was placed in [facility] on 12/24/24. Robertson was functioning well until the morning of admission when the home health aide noted bright red blood in the bag. Patient also complaining of lower abdominal pain.  Patient is on daily Eliquis.  No fevers.  Normal p.o. intake.  No back pain.  Denies history of kidney stones. Prior to having an indwelling Robertson placed in December 2024 patient would self cath daily (6 times a day).    #Hematuria   #UTI   #Possible bladder mass  - Robertson changed in the ED - now without blood appreciated   - Hgb stable    - Abnormal UA  - Continue rocephin  - F/u urine and blood cultures   - F/u urology consult  - CT urogram   - Will hold eliquis pending procedure if any    #Moderate to large bilateral pleural effusions  # Likely  Acute on chronic HFpEF   - As per CT abdomen and pelvis   - Thoracic on board, monitor on lasix    - Hold eliquis for now   - BNP elevated  - Echo reviewed as above  - Lasix IV daily   - Appears fluid overload   - Cardiology consult appreciated    #Hyponatremia   - hypervolemic hyponatremia   - He received 1L NS bolus in the ED  -  today  - Continue lasix as per cardio  - F/U urine/serum studies   - Nephrology consult appreciated    # LUE swelling  - Doppler negative  - Likely infiltrate     #Afib   - Holding off on Eliquis for possible thoracentesis   - Continue metoprolol succinate 25 mg QD    # History of UC  - Continue sulfasalazine    # BPH  - Chronic robertson  - Will continue doxasozin till urology sees    #T2DM  - Continue lantus  - Continue insulin sliding scale    #DVT ppx   - Takes Eliquis 2.5 mg - will hold off for now  - SCDs   89 y/o M with a PMH of A-fib on Eliquis, GERD, hypertension, hyperlipidemia, CVA, diabetes, rectal CA with chronic diarrhea from enemas, atonic bladder with self-catheterizations, status post permanent pacemaker for bradycardia and syncope and persistent known moderate sized pericardial effusion presenting to  ED on 1/23/25 for suprapubic pain. He has a chronic Robertson that was placed in [facility] on 12/24/24. Robertson was functioning well until the morning of admission when the home health aide noted bright red blood in the bag. Patient also complaining of lower abdominal pain.  Patient is on daily Eliquis.  No fevers.  Normal p.o. intake.  No back pain.  Denies history of kidney stones. Prior to having an indwelling Robertson placed in December 2024 patient would self cath daily (6 times a day).    #Hematuria   #UTI   #Possible bladder mass  - Robertson changed in the ED - now without blood appreciated   - Hgb stable    - Abnormal UA  - Continue rocephin  - F/u urine and blood cultures   - F/u urology consult  - CT urogram   - Will hold eliquis pending procedure if any    #Moderate to large bilateral pleural effusions  # Likely  Acute on chronic HFpEF  # Pericardial effusion   - As per CT abdomen and pelvis   - Thoracic on board, monitor on lasix    - Hold eliquis for now   - BNP elevated  - Echo reviewed as above  - Lasix IV daily   - Moderate pericardial effusion   - Cardiology consult appreciated    #Hyponatremia   - hypervolemic hyponatremia   - He received 1L NS bolus in the ED  -  today  - Continue lasix as per cardio  - F/U urine/serum studies   - Nephrology consult appreciated    # LUE swelling  - Doppler negative  - Likely infiltrate     #Afib   - Holding off on Eliquis for possible thoracentesis   - Continue metoprolol succinate 25 mg QD    # History of UC  - Continue sulfasalazine    # BPH  - Chronic robertson  - Will continue doxasozin till urology sees    #T2DM  - Continue lantus  - Continue insulin sliding scale    #DVT ppx   - Takes Eliquis 2.5 mg - will hold off for now  - SCDs   89 y/o M with a PMH of A-fib on Eliquis, GERD, hypertension, hyperlipidemia, CVA, diabetes, rectal CA with chronic diarrhea from enemas, atonic bladder with self-catheterizations, status post permanent pacemaker for bradycardia and syncope and persistent known moderate sized pericardial effusion presenting to  ED on 1/23/25 for suprapubic pain. He has a chronic Robertson that was placed in [facility] on 12/24/24. Robertson was functioning well until the morning of admission when the home health aide noted bright red blood in the bag. Patient also complaining of lower abdominal pain.  Patient is on daily Eliquis.  No fevers.  Normal p.o. intake.  No back pain.  Denies history of kidney stones. Prior to having an indwelling Robertson placed in December 2024 patient would self cath daily (6 times a day).    #Hematuria   #UTI   #Possible bladder mass  - Robertson changed in the ED - now without blood appreciated   - Hgb stable    - Abnormal UA  - Continue rocephin  - F/u urine and blood cultures   - F/u urology consult  - CT urogram   - Will hold eliquis pending procedure if any    #Moderate to large bilateral pleural effusions  # Likely  Acute on chronic HFpEF  # Pericardial effusion   - As per CT abdomen and pelvis   - Thoracic on board, monitor on lasix    - Hold eliquis for now   - BNP elevated  - Echo reviewed as above  - Lasix IV daily   - Moderate pericardial effusion  - Cardiology consult appreciated    #Hyponatremia   - hypervolemic hyponatremia   - He received 1L NS bolus in the ED  -  today  - Continue lasix as per cardio  - F/U urine/serum studies   - Nephrology consult appreciated    # LUE swelling  - Doppler negative  - Likely infiltrate     #Afib   - Holding off on Eliquis for possible thoracentesis   - Continue metoprolol succinate 25 mg QD    # History of UC  - Continue sulfasalazine    # BPH  - Chronic robertson  - Will continue doxasozin till urology sees    #T2DM  - Continue lantus  - Continue insulin sliding scale    #DVT ppx   - Takes Eliquis 2.5 mg - will hold off for now  - SCDs   89 y/o M with a PMH of A-fib on Eliquis, GERD, hypertension, hyperlipidemia, CVA, diabetes, rectal CA with chronic diarrhea from enemas, atonic bladder with self-catheterizations, status post permanent pacemaker for bradycardia and syncope and persistent known moderate sized pericardial effusion presenting to  ED on 1/23/25 for suprapubic pain. He has a chronic Robertson that was placed in [facility] on 12/24/24. Robertson was functioning well until the morning of admission when the home health aide noted bright red blood in the bag. Patient also complaining of lower abdominal pain.  Patient is on daily Eliquis.  No fevers.  Normal p.o. intake.  No back pain.  Denies history of kidney stones. Prior to having an indwelling Robertson placed in December 2024 patient would self cath daily (6 times a day).    #Hematuria   #UTI   #Possible bladder mass  - Robertson changed in the ED - now without blood appreciated   - Hgb stable    - Abnormal UA  - Continue rocephin  - F/u urine and blood cultures   - F/u urology consult  - CT urogram   - Will hold eliquis pending procedure if any    #Moderate to large bilateral pleural effusions  # Likely  Acute on chronic HFpEF  # Pericardial effusion   - As per CT abdomen and pelvis   - Thoracic on board, monitor on lasix    - Hold eliquis for now   - BNP elevated  - Echo reviewed as above  - Lasix IV daily   - Moderate pericardial effusion SP pericardial window 10/24  - Cardiology consult appreciated    #Hyponatremia   - hypervolemic hyponatremia   - He received 1L NS bolus in the ED  -  today  - Continue lasix as per cardio  - F/U urine/serum studies   - Nephrology consult appreciated    # LUE swelling  - Doppler negative  - Likely infiltrate     #Afib   - Holding off on Eliquis for possible thoracentesis   - Continue metoprolol succinate 25 mg QD    # History of UC  - Continue sulfasalazine    # BPH  - Chronic robertson  - Will continue doxasozin till urology sees    #T2DM  - Continue lantus  - Continue insulin sliding scale    #DVT ppx   - Takes Eliquis 2.5 mg - will hold off for now  - SCDs   89 y/o M with a PMH of A-fib on Eliquis, GERD, hypertension, hyperlipidemia, CVA, diabetes, rectal CA with chronic diarrhea from enemas, atonic bladder with self-catheterizations, status post permanent pacemaker for bradycardia and syncope and persistent known moderate sized pericardial effusion presenting to  ED on 1/23/25 for suprapubic pain. He has a chronic Robertson that was placed in [facility] on 12/24/24. Robertson was functioning well until the morning of admission when the home health aide noted bright red blood in the bag. Patient also complaining of lower abdominal pain.  Patient is on daily Eliquis.  No fevers.  Normal p.o. intake.  No back pain.  Denies history of kidney stones. Prior to having an indwelling Robertson placed in December 2024 patient would self cath daily (6 times a day).    #Hematuria   #UTI   #Possible bladder mass  - Robertson changed in the ED - now without blood appreciated   - Hgb stable    - Abnormal UA  - Continue rocephin  - F/u urine and blood cultures   - F/u urology consult  - CT urogram   - Will hold eliquis pending procedure if any    #Moderate to large bilateral pleural effusions  # Likely  Acute on chronic HFpEF  # Pericardial effusion   - As per CT abdomen and pelvis   - Thoracic on board, monitor on lasix    - Hold eliquis for now   - BNP elevated  - Echo reviewed as above  - Lasix IV daily   - Moderate pericardial effusion SP pericardial window 10/24  - Cardiology consult appreciated    #Hyponatremia   - hypervolemic hyponatremia   - He received 1L NS bolus in the ED  -  today  - Continue lasix as per cardio  - F/U urine/serum studies   - Nephrology consult appreciated  - Add NACL  - Continue albumin    # LUE swelling  - Doppler negative  - Likely infiltrate     #Afib   - Holding off on Eliquis for possible thoracentesis   - Continue metoprolol succinate 25 mg QD    # History of UC  - Continue sulfasalazine    # BPH  - Chronic robertson  - Will continue doxasozin till urology sees    #T2DM  - Continue lantus  - Continue insulin sliding scale    #DVT ppx   - Takes Eliquis 2.5 mg - will hold off for now  - SCDs   89 y/o M with a PMH of A-fib on Eliquis, GERD, hypertension, hyperlipidemia, CVA, diabetes, rectal CA with chronic diarrhea from enemas, atonic bladder with self-catheterizations, status post permanent pacemaker for bradycardia and syncope and persistent known moderate sized pericardial effusion presenting to  ED on 1/23/25 for suprapubic pain. He has a chronic Robertson that was placed in [facility] on 12/24/24. Robertson was functioning well until the morning of admission when the home health aide noted bright red blood in the bag. Patient also complaining of lower abdominal pain.  Patient is on daily Eliquis.  No fevers.  Normal p.o. intake.  No back pain.  Denies history of kidney stones. Prior to having an indwelling Robertson placed in December 2024 patient would self cath daily (6 times a day).    #Hematuria   #UTI   #Possible bladder mass  - Robertson changed in the ED - now without blood appreciated   - Hgb stable    - Abnormal UA  - SP rocephin  - neg blood cultures   - Urine growing pseud and E Faecalis  - Start cefepime and cipro  - ID consult appreciated   - F/u urology consult  - CT urogram   - Will hold eliquis pending procedure if any    #Moderate to large bilateral pleural effusions  # Likely  Acute on chronic HFpEF  # Pericardial effusion   - As per CT abdomen and pelvis   - Thoracic on board, monitor on lasix    - Hold eliquis for now   - BNP elevated  - Echo reviewed as above  - Lasix IV daily   - Moderate pericardial effusion SP pericardial window 10/24  - Cardiology consult appreciated    #Hyponatremia   - hypervolemic hyponatremia   - He received 1L NS bolus in the ED  -  today  - Continue lasix as per cardio  - F/U urine/serum studies   - Nephrology consult appreciated  - Add NACL  - Continue albumin    # LUE swelling  - Doppler negative  - Likely infiltrate     #Afib   - Holding off on Eliquis for possible thoracentesis   - Continue metoprolol succinate 25 mg QD    # History of UC  - Continue sulfasalazine    # BPH  - Chronic robertson  - Will continue doxasozin till urology sees    #T2DM  - Continue lantus  - Continue insulin sliding scale    #DVT ppx   - Takes Eliquis 2.5 mg - will hold off for now  - SCDs   89 y/o M with a PMH of A-fib on Eliquis, GERD, hypertension, hyperlipidemia, CVA, diabetes, rectal CA with chronic diarrhea from enemas, atonic bladder with self-catheterizations, status post permanent pacemaker for bradycardia and syncope and persistent known moderate sized pericardial effusion presenting to  ED on 1/23/25 for suprapubic pain. He has a chronic Robertson that was placed in [facility] on 12/24/24. Robertson was functioning well until the morning of admission when the home health aide noted bright red blood in the bag. Patient also complaining of lower abdominal pain.  Patient is on daily Eliquis.  No fevers.  Normal p.o. intake.  No back pain.  Denies history of kidney stones. Prior to having an indwelling Robertson placed in December 2024 patient would self cath daily (6 times a day).    #Hematuria   #UTI   #Possible bladder mass  - Robertson changed in the ED - now without blood appreciated   - Hgb stable    - Abnormal UA  - SP rocephin  - neg blood cultures   - Urine growing pseud and E Faecalis  - Start cefepime and cipro  - ID consult appreciated   - F/u urology consult  - CT urogram   - Will hold eliquis pending procedure if any    #Moderate to large bilateral pleural effusions  # Likely  Acute on chronic HFpEF  # Pericardial effusion   - As per CT abdomen and pelvis   - Thoracic on board, monitor on lasix    - Hold eliquis for now   - BNP elevated  - Echo reviewed as above  - Lasix IV BID  - Albumin BID  - Moderate pericardial effusion SP pericardial window 10/24  - Cardiology consult appreciated    #Hyponatremia   - hypervolemic hyponatremia   - He received 1L NS bolus in the ED  -  today  - Continue lasix as per cardio  - F/U urine/serum studies   - Nephrology consult appreciated  - Add NACL  - Continue albumin    # LUE swelling  - Doppler negative  - Likely infiltrate     #Afib   - Holding off on Eliquis for possible thoracentesis   - Continue metoprolol succinate 25 mg QD    # History of UC  - Continue sulfasalazine    # BPH  - Chronic robertson  - Will continue doxasozin till urology sees    #T2DM  - Continue lantus  - Continue insulin sliding scale    #DVT ppx   - Takes Eliquis 2.5 mg - will hold off for now  - SCDs

## 2025-01-25 NOTE — PROGRESS NOTE ADULT - SUBJECTIVE AND OBJECTIVE BOX
HOSPITALIST ATTENDING PROGRESS NOTE    Chart and meds reviewed.  Patient seen and examined.    CC: Hematuria/bladder mass    Subjective: Some LUE swelling, no fever.     All other systems reviewed and found to be negative with the exception of what has been described above.    MEDICATIONS  (STANDING):  atorvastatin 80 milliGRAM(s) Oral at bedtime  cefTRIAXone Injectable. 1000 milliGRAM(s) IV Push every 24 hours  dextrose 5%. 1000 milliLiter(s) (50 mL/Hr) IV Continuous <Continuous>  dextrose 5%. 1000 milliLiter(s) (100 mL/Hr) IV Continuous <Continuous>  dextrose 50% Injectable 25 Gram(s) IV Push once  dextrose 50% Injectable 12.5 Gram(s) IV Push once  dextrose 50% Injectable 25 Gram(s) IV Push once  doxazosin 4 milliGRAM(s) Oral at bedtime  fluticasone propionate/ salmeterol 100-50 MICROgram(s) Diskus 1 Dose(s) Inhalation two times a day  furosemide   Injectable 40 milliGRAM(s) IV Push daily  glucagon  Injectable 1 milliGRAM(s) IntraMuscular once  insulin glargine Injectable (LANTUS) 20 Unit(s) SubCutaneous at bedtime  insulin lispro (ADMELOG) corrective regimen sliding scale   SubCutaneous three times a day before meals  insulin lispro (ADMELOG) corrective regimen sliding scale   SubCutaneous at bedtime  sulfaSALAzine 500 milliGRAM(s) Oral daily    MEDICATIONS  (PRN):  acetaminophen     Tablet .. 650 milliGRAM(s) Oral every 6 hours PRN Mild Pain (1 - 3)  acetaminophen   IVPB .. 1000 milliGRAM(s) IV Intermittent once PRN Mild Pain (1 - 3)  dextrose Oral Gel 15 Gram(s) Oral once PRN Blood Glucose LESS THAN 70 milliGRAM(s)/deciliter  melatonin 3 milliGRAM(s) Oral at bedtime PRN Insomnia  ondansetron Injectable 4 milliGRAM(s) IV Push every 6 hours PRN Nausea and/or Vomiting    Vital Signs Last 24 Hrs  T(C): 36.7 (25 Jan 2025 08:06), Max: 37.2 (24 Jan 2025 15:00)  T(F): 98.1 (25 Jan 2025 08:06), Max: 99 (24 Jan 2025 15:00)  HR: 88 (25 Jan 2025 08:44) (68 - 88)  BP: 134/69 (25 Jan 2025 08:06) (110/42 - 139/62)  RR: 16 (25 Jan 2025 08:06) (16 - 20)  SpO2: 92% (25 Jan 2025 08:44) (91% - 95%)    Parameters below as of 25 Jan 2025 08:44  Patient On (Oxygen Delivery Method): room air    GEN; NAD  HEENT:  pupils equal and reactive, EOMI, no oropharyngeal lesions, erythema, exudates, oral thrush  NECK:   supple, no carotid bruits  CV:  +S1, +S2, regular, no murmurs  RESP:   lungs clear to auscultation bilaterally, no wheezing, rales, rhonchi, good air entry bilaterally  GI:  abdomen soft, non-tender, non-distended, normal BS  EXT:  Bilateral LE edema, LUE edema  NEURO:  AAOX3, no focal neurological deficits, follows all commands, able to move extremities spontaneously  SKIN:  no ulcers, lesions or rashes  : Crump clear     LABS:                          12.5   7.21  )-----------( 106      ( 25 Jan 2025 07:47 )             39.0     01-25    124[L]  |  94[L]  |  16  ----------------------------<  103[H]  4.8   |  27  |  0.86    Ca    9.6      25 Jan 2025 07:47  Phos  3.2     01-24  Mg     1.8     01-24    TPro  5.6[L]  /  Alb  2.1[L]  /  TBili  0.4  /  DBili  x   /  AST  17  /  ALT  8[L]  /  AlkPhos  71  01-25    LIVER FUNCTIONS - ( 25 Jan 2025 07:47 )  Alb: 2.1 g/dL / Pro: 5.6 gm/dL / ALK PHOS: 71 U/L / ALT: 8 U/L / AST: 17 U/L / GGT: x           PT/INR - ( 23 Jan 2025 14:45 )   PT: 14.6 sec;   INR: 1.24 ratio    PTT - ( 23 Jan 2025 14:45 )  PTT:32.5 sec    Urinalysis Basic - ( 25 Jan 2025 07:47 )  Color: x / Appearance: x / SG: x / pH: x  Gluc: 103 mg/dL / Ketone: x  / Bili: x / Urobili: x   Blood: x / Protein: x / Nitrite: x   Leuk Esterase: x / RBC: x / WBC x   Sq Epi: x / Non Sq Epi: x / Bacteria: x    CT Abdomen and Pelvis No Cont (01.23.25 @ 16:23) >  IMPRESSION:  Bilateral pleural effusions.    Pericardial effusion. Cardiomegaly.    Bilateral renal cysts, some of which with rim calcification. Right renal   mass. Also bladder clot, cannot exclude mass. Recommend CT urogram if   feasible.    Diverticulosis.    Extensive atherosclerosis.       HOSPITALIST ATTENDING PROGRESS NOTE    Chart and meds reviewed.  Patient seen and examined.    CC: Hematuria/bladder mass    Subjective: Some LUE swelling, no fever.     All other systems reviewed and found to be negative with the exception of what has been described above.    MEDICATIONS  (STANDING):  atorvastatin 80 milliGRAM(s) Oral at bedtime  cefTRIAXone Injectable. 1000 milliGRAM(s) IV Push every 24 hours  dextrose 5%. 1000 milliLiter(s) (50 mL/Hr) IV Continuous <Continuous>  dextrose 5%. 1000 milliLiter(s) (100 mL/Hr) IV Continuous <Continuous>  dextrose 50% Injectable 25 Gram(s) IV Push once  dextrose 50% Injectable 12.5 Gram(s) IV Push once  dextrose 50% Injectable 25 Gram(s) IV Push once  doxazosin 4 milliGRAM(s) Oral at bedtime  fluticasone propionate/ salmeterol 100-50 MICROgram(s) Diskus 1 Dose(s) Inhalation two times a day  furosemide   Injectable 40 milliGRAM(s) IV Push daily  glucagon  Injectable 1 milliGRAM(s) IntraMuscular once  insulin glargine Injectable (LANTUS) 20 Unit(s) SubCutaneous at bedtime  insulin lispro (ADMELOG) corrective regimen sliding scale   SubCutaneous three times a day before meals  insulin lispro (ADMELOG) corrective regimen sliding scale   SubCutaneous at bedtime  sulfaSALAzine 500 milliGRAM(s) Oral daily    MEDICATIONS  (PRN):  acetaminophen     Tablet .. 650 milliGRAM(s) Oral every 6 hours PRN Mild Pain (1 - 3)  acetaminophen   IVPB .. 1000 milliGRAM(s) IV Intermittent once PRN Mild Pain (1 - 3)  dextrose Oral Gel 15 Gram(s) Oral once PRN Blood Glucose LESS THAN 70 milliGRAM(s)/deciliter  melatonin 3 milliGRAM(s) Oral at bedtime PRN Insomnia  ondansetron Injectable 4 milliGRAM(s) IV Push every 6 hours PRN Nausea and/or Vomiting    Vital Signs Last 24 Hrs  T(C): 36.7 (25 Jan 2025 08:06), Max: 37.2 (24 Jan 2025 15:00)  T(F): 98.1 (25 Jan 2025 08:06), Max: 99 (24 Jan 2025 15:00)  HR: 88 (25 Jan 2025 08:44) (68 - 88)  BP: 134/69 (25 Jan 2025 08:06) (110/42 - 139/62)  RR: 16 (25 Jan 2025 08:06) (16 - 20)  SpO2: 92% (25 Jan 2025 08:44) (91% - 95%)    Parameters below as of 25 Jan 2025 08:44  Patient On (Oxygen Delivery Method): room air    GEN; NAD  HEENT:  pupils equal and reactive, EOMI, no oropharyngeal lesions, erythema, exudates, oral thrush  NECK:   supple, no carotid bruits  CV:  +S1, +S2, regular, no murmurs  RESP:   lungs clear to auscultation bilaterally, no wheezing, rales, rhonchi, good air entry bilaterally  GI:  abdomen soft, non-tender, non-distended, normal BS  EXT:  Bilateral LE edema, LUE edema  NEURO:  AAOX3, no focal neurological deficits, follows all commands, able to move extremities spontaneously  SKIN:  no ulcers, lesions or rashes  : Crump clear     LABS:                          12.5   7.21  )-----------( 106      ( 25 Jan 2025 07:47 )             39.0     01-25    124[L]  |  94[L]  |  16  ----------------------------<  103[H]  4.8   |  27  |  0.86    Ca    9.6      25 Jan 2025 07:47  Phos  3.2     01-24  Mg     1.8     01-24    TPro  5.6[L]  /  Alb  2.1[L]  /  TBili  0.4  /  DBili  x   /  AST  17  /  ALT  8[L]  /  AlkPhos  71  01-25    LIVER FUNCTIONS - ( 25 Jan 2025 07:47 )  Alb: 2.1 g/dL / Pro: 5.6 gm/dL / ALK PHOS: 71 U/L / ALT: 8 U/L / AST: 17 U/L / GGT: x           PT/INR - ( 23 Jan 2025 14:45 )   PT: 14.6 sec;   INR: 1.24 ratio    PTT - ( 23 Jan 2025 14:45 )  PTT:32.5 sec    Urinalysis Basic - ( 25 Jan 2025 07:47 )  Color: x / Appearance: x / SG: x / pH: x  Gluc: 103 mg/dL / Ketone: x  / Bili: x / Urobili: x   Blood: x / Protein: x / Nitrite: x   Leuk Esterase: x / RBC: x / WBC x   Sq Epi: x / Non Sq Epi: x / Bacteria: x    CT Abdomen and Pelvis No Cont (01.23.25 @ 16:23) >  IMPRESSION:  Bilateral pleural effusions.    Pericardial effusion. Cardiomegaly.    Bilateral renal cysts, some of which with rim calcification. Right renal   mass. Also bladder clot, cannot exclude mass. Recommend CT urogram if   feasible.    Diverticulosis.    Extensive atherosclerosis.    < from: TTE W or WO Ultrasound Enhancing Agent (01.24.25 @ 14:11) >   1. Mild left ventricular hypertrophy.   2. Left ventricular cavity is normal in size. Left ventricular wall thickness is mildly increased. Left ventricular systolic function isnormal with an ejection fraction visually estimated at 55 to 60 %.   3. Mildly enlarged right ventricular cavity size and normal right ventricular systolic function.   4. Left atrium is severely dilated.   5. Mild to moderate mitral regurgitation.   6. Moderate tricuspid regurgitation.   7. Estimated pulmonary artery systolic pressure is 75 mmHg, consistent with severe pulmonary hypertension.   8. The peak transaortic velocity is 2.32 m/s, peak transaortic gradient is 21.5 mmHg and mean transaortic gradient is 13.0 mmHg with an LVOT/aortic valve VTI ratio of 0.35. The effective orifice area is estimated at 1.10 cm² by the continuity equation and 1.44 cm² by 2D planimetry.   9. There is moderate calcification of the aortic valve leaflets. Moderate aortic stenosis.  10. Mild pulmonic regurgitation.  11. Moderate pericardial effusion with no echocardiographic evidence of tamponade physiology.  12. Moderate left pleural effusion noted.    < end of copied text >

## 2025-01-25 NOTE — PROGRESS NOTE ADULT - PROBLEM SELECTOR PLAN 1
·  Problem: HF (heart failure).   ·  Recommendation: chronic HF (unknown EF), with recent pericardial effusion s/.p pericardiocentesis and pleural effusion s/p thoracentesis at [facility] in 10/24, now with pericardial and B/L pleural effusion left>right   Recommendation: Echo performed, results pending, lasix 40 mg ivp daily started yesterday, Na today 124; . Would continue to diurese as tolerated. Closely trend Na level. CW toprol xl 25 mg po daily,  .  CT Sx consult appreciated - cont. diuresis, will monitor with cxr, daily weight.  Nothing to do in acute setting ·  Problem: HF (heart failure).   ·  Recommendation: chronic HF (unknown EF), with recent pericardial effusion s/.p pericardiocentesis and pleural effusion s/p thoracentesis at [facility] in 10/24, now with pericardial and B/L pleural effusion left>right   Recommendation: Echo performed, results pending, lasix 40 mg ivp daily started yesterday, Na today 124; . Would hold am dose given decrease NA.  Will reeval labs in am.  Renal input appreciated.  . CW toprol xl 25 mg po daily,  .  CT Sx consult appreciated - will monitor with cxr, daily weight.  Nothing to do in acute setting

## 2025-01-25 NOTE — PROGRESS NOTE ADULT - PROBLEM SELECTOR PLAN 2
·  Problem: Pericardial effusion.   ·  Recommendation: pt; with recent pericardial effusion s/.p pericardiocentesis on 10/24 at [facility], CT shows pericardial effusion and cardiomegaly, Echo results pending

## 2025-01-25 NOTE — CONSULT NOTE ADULT - REASON FOR ADMISSION
hematuria   UTI   pleural effusions

## 2025-01-25 NOTE — PROGRESS NOTE ADULT - SUBJECTIVE AND OBJECTIVE BOX
NEPHROLOGY INTERVAL HPI/OVERNIGHT EVENTS:    Date of Service: 25 @ 09:53    --    HPI:  Patient is an 89 y/o M with a PMH of A-fib on Eliquis, GERD, hypertension, hyperlipidemia, CVA, diabetes, rectal CA with chronic diarrhea from enemas, atonic bladder with self-catheterizations, status post permanent pacemaker for bradycardia and syncope and persistent known moderate sized pericardial effusion presenting to  ED on 25 for suprapubic pain.   hx from daughter at bedside  has hx of pericardial effusion s/p windon in 10/24 at [facility]  hx of pleural effusion s/p thoracentesis 10/24  has been on lasix with na of 121 and k of 5.7 on    advised byhis cardiologist to dc his lasix and start nacl tabs with dc of kcl tabs  restarted on lasix 40 qd  CTS surgery for eval fo thoracentesis   chronic robertson   CBI in progress   minimal po intake including supplements     PAST MEDICAL & SURGICAL HISTORY:  - htn   - rectal ca   - bph with chronic robertson   - diast chf  - hx of pericardial effusion s/p window  - hx of pleural effusion     MEDICATIONS  (STANDING):  atorvastatin 80 milliGRAM(s) Oral at bedtime  cefTRIAXone Injectable. 1000 milliGRAM(s) IV Push every 24 hours  dextrose 5%. 1000 milliLiter(s) (50 mL/Hr) IV Continuous <Continuous>  dextrose 5%. 1000 milliLiter(s) (100 mL/Hr) IV Continuous <Continuous>  dextrose 50% Injectable 25 Gram(s) IV Push once  dextrose 50% Injectable 12.5 Gram(s) IV Push once  dextrose 50% Injectable 25 Gram(s) IV Push once  doxazosin 4 milliGRAM(s) Oral at bedtime  fluticasone propionate/ salmeterol 100-50 MICROgram(s) Diskus 1 Dose(s) Inhalation two times a day  furosemide   Injectable 40 milliGRAM(s) IV Push daily  glucagon  Injectable 1 milliGRAM(s) IntraMuscular once  insulin glargine Injectable (LANTUS) 20 Unit(s) SubCutaneous at bedtime  insulin lispro (ADMELOG) corrective regimen sliding scale   SubCutaneous three times a day before meals  insulin lispro (ADMELOG) corrective regimen sliding scale   SubCutaneous at bedtime  sulfaSALAzine 500 milliGRAM(s) Oral daily    MEDICATIONS  (PRN):  acetaminophen     Tablet .. 650 milliGRAM(s) Oral every 6 hours PRN Mild Pain (1 - 3)  acetaminophen   IVPB .. 1000 milliGRAM(s) IV Intermittent once PRN Mild Pain (1 - 3)  dextrose Oral Gel 15 Gram(s) Oral once PRN Blood Glucose LESS THAN 70 milliGRAM(s)/deciliter  melatonin 3 milliGRAM(s) Oral at bedtime PRN Insomnia  ondansetron Injectable 4 milliGRAM(s) IV Push every 6 hours PRN Nausea and/or Vomiting    Vital Signs Last 24 Hrs  T(C): 36.7 (2025 08:06), Max: 37.2 (2025 15:00)  T(F): 98.1 (2025 08:06), Max: 99 (2025 15:00)  HR: 88 (2025 08:44) (68 - 88)  BP: 134/69 (2025 08:06) (110/42 - 139/62)  BP(mean): --  RR: 16 (2025 08:06) (16 - 20)  SpO2: 92% (2025 08:44) (91% - 95%)    Parameters below as of 2025 08:44  Patient On (Oxygen Delivery Method): room air      Daily     Daily Weight in k.1 (2025 06:06)     @ 07:01  -   @ 07:00  --------------------------------------------------------  IN: 6000 mL / OUT: 58168 mL / NET: -5150 mL    PHYSICAL EXAM:  GENERAL:   CHEST/LUNG:   HEART:   ABDOMEN:   EXTREMITIES:   SKIN:     LABS:                        12.5   7.21  )-----------( 106      ( 2025 07:47 )             39.0         124[L]  |  94[L]  |  16  ----------------------------<  103[H]  4.8   |  27  |  0.86    Ca    9.6      2025 07:47  Phos  3.2       Mg     1.8         TPro  5.6[L]  /  Alb  2.1[L]  /  TBili  0.4  /  DBili  x   /  AST  17  /  ALT  8[L]  /  AlkPhos  71      PT/INR - ( 2025 14:45 )   PT: 14.6 sec;   INR: 1.24 ratio         PTT - ( 2025 14:45 )  PTT:32.5 sec  Urinalysis Basic - ( 2025 07:47 )    Color: x / Appearance: x / SG: x / pH: x  Gluc: 103 mg/dL / Ketone: x  / Bili: x / Urobili: x   Blood: x / Protein: x / Nitrite: x   Leuk Esterase: x / RBC: x / WBC x   Sq Epi: x / Non Sq Epi: x / Bacteria: x              RADIOLOGY & ADDITIONAL TESTS:   NEPHROLOGY INTERVAL HPI/OVERNIGHT EVENTS:    Date of Service: 25 @ 09:53    --Resting comfortably. Denies SOB.  Pt's daughter relates long hx of Hyponatremia and CHF /anasarca mainly managed by his Cardiologist. Dr Jenkins           Had been on varying dose of lasix with NACL tabs 1 gm QOD and then daily.           Pt had been self catheterizing( done by aide)  until  admission in 2024 for CHF and pericardial effusion ( window done)--d/c'd with indwelling catheter.    HPI:  Patient is an 89 y/o M with a PMH of A-fib on Eliquis, GERD, hypertension, hyperlipidemia, CVA, diabetes, rectal CA with chronic diarrhea from enemas, atonic bladder with self-catheterizations, status post permanent pacemaker for bradycardia and syncope and persistent known moderate sized pericardial effusion presenting to  ED on 25 for suprapubic pain.   hx from daughter at bedside  has hx of pericardial effusion s/p windon in 10/24 at [facility]  hx of pleural effusion s/p thoracentesis 10/24  has been on lasix with na of 121 and k of 5.7 on    advised byhis cardiologist to dc his lasix and start nacl tabs with dc of kcl tabs  restarted on lasix 40 qd  CTS surgery for eval fo thoracentesis   chronic robertson   CBI in progress   minimal po intake including supplements     PAST MEDICAL & SURGICAL HISTORY:  - htn   - rectal ca   - bph with chronic robertson   - diast chf  - hx of pericardial effusion s/p window  - hx of pleural effusion     MEDICATIONS  (STANDING):  atorvastatin 80 milliGRAM(s) Oral at bedtime  cefTRIAXone Injectable. 1000 milliGRAM(s) IV Push every 24 hours  dextrose 5%. 1000 milliLiter(s) (50 mL/Hr) IV Continuous <Continuous>  dextrose 5%. 1000 milliLiter(s) (100 mL/Hr) IV Continuous <Continuous>  dextrose 50% Injectable 25 Gram(s) IV Push once  dextrose 50% Injectable 12.5 Gram(s) IV Push once  dextrose 50% Injectable 25 Gram(s) IV Push once  doxazosin 4 milliGRAM(s) Oral at bedtime  fluticasone propionate/ salmeterol 100-50 MICROgram(s) Diskus 1 Dose(s) Inhalation two times a day  furosemide   Injectable 40 milliGRAM(s) IV Push daily  glucagon  Injectable 1 milliGRAM(s) IntraMuscular once  insulin glargine Injectable (LANTUS) 20 Unit(s) SubCutaneous at bedtime  insulin lispro (ADMELOG) corrective regimen sliding scale   SubCutaneous three times a day before meals  insulin lispro (ADMELOG) corrective regimen sliding scale   SubCutaneous at bedtime  sulfaSALAzine 500 milliGRAM(s) Oral daily    MEDICATIONS  (PRN):  acetaminophen     Tablet .. 650 milliGRAM(s) Oral every 6 hours PRN Mild Pain (1 - 3)  acetaminophen   IVPB .. 1000 milliGRAM(s) IV Intermittent once PRN Mild Pain (1 - 3)  dextrose Oral Gel 15 Gram(s) Oral once PRN Blood Glucose LESS THAN 70 milliGRAM(s)/deciliter  melatonin 3 milliGRAM(s) Oral at bedtime PRN Insomnia  ondansetron Injectable 4 milliGRAM(s) IV Push every 6 hours PRN Nausea and/or Vomiting    Vital Signs Last 24 Hrs  T(C): 36.7 (2025 08:06), Max: 37.2 (2025 15:00)  T(F): 98.1 (2025 08:06), Max: 99 (2025 15:00)  HR: 88 (2025 08:44) (68 - 88)  BP: 134/69 (2025 08:06) (110/42 - 139/62)  BP(mean): --  RR: 16 (2025 08:06) (16 - 20)  SpO2: 92% (2025 08:44) (91% - 95%)    Parameters below as of 2025 08:44  Patient On (Oxygen Delivery Method): room air      Daily     Daily Weight in k.1 (2025 06:06)     @ 07:01  -   @ 07:00  --------------------------------------------------------  IN: 6000 mL / OUT: 07340 mL / NET: -5150 mL    PHYSICAL EXAM:  GENERAL: no acute distress  CHEST/LUNG: poor insp effort.  Ant clear  HEART: S1S2 RRR  ABDOMEN: soft  EXTREMITIES: anasarca  SKIN:     LABS:                        12.5   7.21  )-----------( 106      ( 2025 07:47 )             39.0         124[L]  |  94[L]  |  16  ----------------------------<  103[H]  4.8   |  27  |  0.86    Ca    9.6      2025 07:47  Phos  3.2     -  Mg     1.8         TPro  5.6[L]  /  Alb  2.1[L]  /  TBili  0.4  /  DBili  x   /  AST  17  /  ALT  8[L]  /  AlkPhos  71      PT/INR - ( 2025 14:45 )   PT: 14.6 sec;   INR: 1.24 ratio         PTT - ( 2025 14:45 )  PTT:32.5 sec  Urinalysis Basic - ( 2025 07:47 )    Color: x / Appearance: x / SG: x / pH: x  Gluc: 103 mg/dL / Ketone: x  / Bili: x / Urobili: x   Blood: x / Protein: x / Nitrite: x   Leuk Esterase: x / RBC: x / WBC x   Sq Epi: x / Non Sq Epi: x / Bacteria: x              RADIOLOGY & ADDITIONAL TESTS:

## 2025-01-25 NOTE — PROGRESS NOTE ADULT - SUBJECTIVE AND OBJECTIVE BOX
CHIEF COMPLAINT:    HISTORY OF PRESENT ILLNESS:    PAST MEDICAL & SURGICAL HISTORY:  Hypertension      Cancer of rectum      RBBB (right bundle branch block)      Asthma      TIA (transient ischemic attack)      UTI (urinary tract infection)      Enlarged prostate      Urinary retention with incomplete bladder emptying      Spinal stenosis      Chronic GERD      Rectal tumor      S/P TURP          REVIEW OF SYSTEMS:    CONSTITUTIONAL: No weakness, fevers or chills  EYES/ENT: No visual changes;  No vertigo or throat pain   NECK: No pain or stiffness  RESPIRATORY: No cough, wheezing, hemoptysis, No shortness of breath  CARDIOVASCULAR: No chest pain or palpitations  GASTROINTESTINAL: No abdominal or flank pain, No nausea, vomiting, diarrhea or constipation  GENITOURINARY: See HPI  NEUROLOGICAL: No numbness or weakness  SKIN: No rashes or lesions   All other review of systems is negative unless indicated above.    MEDICATIONS  (STANDING):  atorvastatin 80 milliGRAM(s) Oral at bedtime  dextrose 5%. 1000 milliLiter(s) (50 mL/Hr) IV Continuous <Continuous>  dextrose 5%. 1000 milliLiter(s) (100 mL/Hr) IV Continuous <Continuous>  dextrose 50% Injectable 25 Gram(s) IV Push once  dextrose 50% Injectable 12.5 Gram(s) IV Push once  dextrose 50% Injectable 25 Gram(s) IV Push once  doxazosin 4 milliGRAM(s) Oral at bedtime  fluticasone propionate/ salmeterol 100-50 MICROgram(s) Diskus 1 Dose(s) Inhalation two times a day  furosemide   Injectable 40 milliGRAM(s) IV Push daily  glucagon  Injectable 1 milliGRAM(s) IntraMuscular once  insulin glargine Injectable (LANTUS) 20 Unit(s) SubCutaneous at bedtime  insulin lispro (ADMELOG) corrective regimen sliding scale   SubCutaneous three times a day before meals  insulin lispro (ADMELOG) corrective regimen sliding scale   SubCutaneous at bedtime  metoprolol succinate ER 25 milliGRAM(s) Oral daily  sulfaSALAzine 500 milliGRAM(s) Oral daily    MEDICATIONS  (PRN):  acetaminophen     Tablet .. 650 milliGRAM(s) Oral every 6 hours PRN Mild Pain (1 - 3)  acetaminophen   IVPB .. 1000 milliGRAM(s) IV Intermittent once PRN Mild Pain (1 - 3)  dextrose Oral Gel 15 Gram(s) Oral once PRN Blood Glucose LESS THAN 70 milliGRAM(s)/deciliter  melatonin 3 milliGRAM(s) Oral at bedtime PRN Insomnia  ondansetron Injectable 4 milliGRAM(s) IV Push every 6 hours PRN Nausea and/or Vomiting      Allergies    No Known Allergies    Intolerances        SOCIAL HISTORY:    FAMILY HISTORY:      Vital Signs Last 24 Hrs  T(C): 36.7 (25 Jan 2025 08:06), Max: 37.2 (24 Jan 2025 15:00)  T(F): 98.1 (25 Jan 2025 08:06), Max: 99 (24 Jan 2025 15:00)  HR: 88 (25 Jan 2025 08:44) (68 - 88)  BP: 134/69 (25 Jan 2025 08:06) (121/62 - 139/62)  BP(mean): --  RR: 16 (25 Jan 2025 08:06) (16 - 19)  SpO2: 92% (25 Jan 2025 08:44) (91% - 94%)    Parameters below as of 25 Jan 2025 08:44  Patient On (Oxygen Delivery Method): room air        PHYSICAL EXAM:    Constitutional: No acute distress  HEENT: EOMI, Normal Hearing  Neck: Supple  Back: No costovertebral angle tenderness  Respiratory: Normal respiratory effort    Cardiovascular: Normal peripheral circulation   Abd: Soft, non distended, non tender  : Normal urethra, ***circumcised penis, bilateral normal to palpate  JOSE: Prostate- *** gms, non tender, no nodules  Extremities: No peripheral edema  Neurological: No focal deficits  Psychiatric: Normal mood, normal affect  Musculoskeletal: Moving all 4 extremities  Skin: No rashes    LABS:                        12.5   7.21  )-----------( 106      ( 25 Jan 2025 07:47 )             39.0     01-25    124[L]  |  94[L]  |  16  ----------------------------<  103[H]  4.8   |  27  |  0.86    Ca    9.6      25 Jan 2025 07:47  Phos  3.2     01-24  Mg     1.8     01-24    TPro  5.6[L]  /  Alb  2.1[L]  /  TBili  0.4  /  DBili  x   /  AST  17  /  ALT  8[L]  /  AlkPhos  71  01-25      Urinalysis Basic - ( 25 Jan 2025 07:47 )    Color: x / Appearance: x / SG: x / pH: x  Gluc: 103 mg/dL / Ketone: x  / Bili: x / Urobili: x   Blood: x / Protein: x / Nitrite: x   Leuk Esterase: x / RBC: x / WBC x   Sq Epi: x / Non Sq Epi: x / Bacteria: x      Urine Culture:     RADIOLOGY & ADDITIONAL STUDIES:   Patient is a 89y old  Male who presents with a chief complaint of hematuria   UTI   pleural effusions (25 Jan 2025 15:09)      Vital Signs Last 24 Hrs  T(C): 37 (25 Jan 2025 21:55), Max: 37 (25 Jan 2025 21:55)  T(F): 98.6 (25 Jan 2025 21:55), Max: 98.6 (25 Jan 2025 21:55)  HR: 88 (25 Jan 2025 21:55) (74 - 88)  BP: 145/61 (25 Jan 2025 21:55) (128/70 - 145/61)  BP(mean): --  RR: 17 (25 Jan 2025 21:55) (16 - 17)  SpO2: 93% (25 Jan 2025 21:55) (92% - 96%)    Parameters below as of 25 Jan 2025 21:55  Patient On (Oxygen Delivery Method): room air  AAO x 3   Normal respiratory effort  Normal peripheral circulation  Abdomen- Soft, ND, NT   Crump to drainage bag- clear urine         LABS:                        12.5   7.21  )-----------( 106      ( 25 Jan 2025 07:47 )             39.0     01-25    124[L]  |  94[L]  |  16  ----------------------------<  103[H]  4.8   |  27  |  0.86    Ca    9.6      25 Jan 2025 07:47  Phos  3.2     01-24  Mg     1.8     01-24    TPro  5.6[L]  /  Alb  2.1[L]  /  TBili  0.4  /  DBili  x   /  AST  17  /  ALT  8[L]  /  AlkPhos  71  01-25      Urinalysis Basic - ( 25 Jan 2025 07:47 )    Color: x / Appearance: x / SG: x / pH: x  Gluc: 103 mg/dL / Ketone: x  / Bili: x / Urobili: x   Blood: x / Protein: x / Nitrite: x   Leuk Esterase: x / RBC: x / WBC x   Sq Epi: x / Non Sq Epi: x / Bacteria: x            Culture Results:   No growth at 48 Hours (01-23-25 @ 15:45)  Culture Results:   No growth at 48 Hours (01-23-25 @ 15:45)  Culture Results:   50,000 - 99,000 CFU/mL Pseudomonas aeruginosa  50,000 - 99,000 CFU/mL Enterococcus faecalis (01-23-25 @ 15:19)    < from: CT Abdomen and Pelvis Urogram w/wo IV Cont (01.25.25 @ 10:17) >  ACC: [id number] EXAM:  CT ABD PELV UROGRAM WAW IC   ORDERED BY: HODA DEJESUS     PROCEDURE DATE:  01/25/2025          INTERPRETATION:  CLINICAL INFORMATION: Bladder mass versus blood clot    ADDITIONAL CLINICAL INFORMATION: Disorder of the urinary system N39.9    COMPARISON: CT abdomen/pelvis January 23, 2025    CONTRAST/COMPLICATIONS:  IV Contrast: Omnipaque 350  90 cc administered   0 cc discarded  Oral Contrast: NONE  .    PROCEDURE:  CT of the Abdomen and Pelvis was performed.  Precontrast, Nephrographic and Excretory phases were acquired (CT   UROGRAM).  Sagittal and coronal reformats were performed.    FINDINGS:  LOWER CHEST: Cardiomegaly, coronary arterial and aortic valvular   calcifications. Cardiac device lead partially imaged. Small pericardial   effusion and large bilateral pleural effusions, not significantly changed.    LIVER: Within normal limits.  BILE DUCTS: Normal caliber.  GALLBLADDER: Within normal limits.  SPLEEN: Within normal limits.  PANCREAS: Within normal limits.  ADRENALS: Within normal limits.  KIDNEYS/URETERS: Multiple bilateral cysts. 3.4 cm left renal hemorrhagic   cyst (5; 71). No hydronephrosis or nephroureterolithiasis. No suspicious   renal or ureteral mass identified.    BLADDER: Crump catheter balloon in the bladder lumen. No bladder mass.   Previously seen high attenuation within the bladder lumen has resolved,   likely reflecting resolved hematoma.  REPRODUCTIVE ORGANS: Prostate is enlarged.    BOWEL: Appendix not definitively identified. Diffuse mild distention of   the small bowel without evidence of obstruction. Colonic diverticulosis.  PERITONEUM/RETROPERITONEUM: Within normal limits.  VESSELS: Atherosclerotic changes.  LYMPH NODES: No lymphadenopathy.  ABDOMINAL WALL: Within normal limits.  BONES: No aggressive lesion.    IMPRESSION:  Resolution of previously seen urinary bladder blood clot.    No hydronephrosis or suspicious urinary tract mass identified.    Unchanged large bilateral pleural effusions.    Unchanged small pericardial effusion.    < end of copied text >

## 2025-01-25 NOTE — CONSULT NOTE ADULT - CONSULT REQUESTED DATE/TIME
25-Jan-2025 15:09
24-Jan-2025 10:11
24-Jan-2025 17:22
24-Jan-2025 17:09
24-Jan-2025 12:11
24-Jan-2025 15:54

## 2025-01-25 NOTE — PROGRESS NOTE ADULT - SUBJECTIVE AND OBJECTIVE BOX
CHIEF COMPLAINT: Patient is a 89y old  Male who presents with a chief complaint of hematuria   UTI   pleural effusions (24 Jan 2025 15:50)      HPI:  Patient is an 89 y/o M with a PMH of A-fib on Eliquis, GERD, hypertension, hyperlipidemia, CVA, diabetes, rectal CA with chronic diarrhea from enemas, atonic bladder with self-catheterizations, status post permanent pacemaker for bradycardia and syncope and persistent known moderate sized pericardial effusion presenting to  ED on 1/23/25 for suprapubic pain.     He has a chronic Crump that was placed in [facility] on 12/24/24. Crump was functioning well until the morning of admission when the home health aide noted bright red blood in the bag. Patient also complaining of lower abdominal pain.  Patient is on daily Eliquis.  No fevers.  Normal p.o. intake.  No back pain.  Denies history of kidney stones.      Prior to having an indwelling Crump placed in December 2024 patient would self cath daily (6 times a day).       Upon arrival to the ED, vitals were /78 HR 82 RR 20 T97.1F and SpO2 of 96% on room air. Labs significant for Na 125, UA with large LE, positive nitrite, TNTC WBC TNTC RBC and moderate bacteria. CT abdomen and pelvis without contrast showed bilateral pleural effusions. Pericardial effusion. Cardiomegaly.Bilateral renal cysts, some of which with rim calcification. Right renal mass. Also bladder clot, cannot exclude mass. Recommend CT urogram if feasible. He received 1L NS bolus, IV Zofran 4mg x 1, IV Tylenol 1g x 1 and IV CFTX 1g x 1. Crump replaced in the ED and large amount of small clots removed. Subsequently, Crump draining clear fluid without issues.     Additional history obtained from home health aide present at bedside. She mentions he was admitted to [facility] in October 2024. There were bilateral pleural effusions. Patient was admitted to [facility] and started on IV diuretics. He then underwent pericardial window and thoracentesis. Post operative course complicated by abdominal pain and distention patient developed Chesterfield's syndrome. Patient was treated with the decompression colonoscopy.     Aide mentions he appears much more fluid overloaded than his baseline. Noticeable in his bilateral lower extremities, bilateral arms and scrotum. She also mentions he is bedbound but appears more fatigued and short of breath at times. Aide says she was instructed by patient's daughter to hold off on his home Lasix dose today because his cardiologist said his Na level is low. He usually takes furosemide 20 mg BID. Last dose of furosemide was Wednesday (1/22/24) AM.     Currently, patient is reporting some suprapubic pain. He received IV Tylenol in the ED. Family is very cautious about using opiates because of his history of constipation. He requires a daily enema in the AM to have a bowel movement.  (24 Jan 2025 01:02)    Cardiology consulted for HF (unknown EF), CT with pericardial effusion and B/L pleural effusion. Pt. with mild SOB, +2 B/L LE edema, Family at bedside - says his edema is much better at present, Echo performed,     1/25/25: Pt awake with aide at bedside. Denies cp or sob.      PAST MEDICAL & SURGICAL HISTORY:  Pericardial effusion s/p pericardiocentesis 10/24 - [facility]   Pleural effusions s/p thoracentesis 10/24 - [facility]   Bradycardia s/p PPM   Hypertension  Cancer of rectum  RBBB (right bundle branch block)  Asthma  TIA (transient ischemic attack)  UTI (urinary tract infection)  Enlarged prostate  Urinary retention with incomplete bladder emptying  Spinal stenosis  Chronic GERD  Rectal tumor  S/P TURP      SOCIAL HISTORY:   Alcohol: Denied  Smoking: Nonsmoker  Drug Use: Denied  Marital Status:     FAMILY HISTORY: non contributory     Home Medications:  albuterol 2.5 mg/3 mL (0.083%) inhalation solution: 3 milliliter(s) by nebulizer 1 to 2 times a day (24 Jan 2025 16:45)  atorvastatin 80 mg oral tablet: 1 tab(s) orally once a day (at bedtime) (24 Jan 2025 16:42)  cefpodoxime 200 mg oral tablet: 1 tab(s) orally every 12 hours ***ON HOLD while at *** (24 Jan 2025 16:39)  CeleBREX 400 mg oral capsule: 1 cap(s) orally once a day (23 Jan 2025 22:53)  chlordiazepoxide-clidinium 5 mg-2.5 mg oral capsule: 1 cap(s) orally 2 times a day (24 Jan 2025 16:42)  clonazePAM 2 mg oral tablet: 1 tab(s) orally once a day as needed for  anxiety (24 Jan 2025 16:45)  Dulera 100 mcg-5 mcg/inh inhalation aerosol: 2 puff(s) inhaled 2 times a day (24 Jan 2025 16:42)  Eliquis 2.5 mg oral tablet: 1 tab(s) orally 2 times a day ***ON HOLD while at *** (24 Jan 2025 16:45)  Lantus Solostar Pen 100 units/mL subcutaneous solution: 20 unit(s) subcutaneous once a day as needed for BG &gt; 150 , for BG &lt; 150 HOLD dose (24 Jan 2025 16:40)  Lasix 20 mg oral tablet: 1 tab(s) orally 2 times a day ***ON HOLD since Monday 1/20*** (24 Jan 2025 16:41)  Linzess 145 mcg oral capsule: 1 cap(s) orally once a day (24 Jan 2025 16:45)  metoprolol succinate 25 mg oral tablet, extended release: 1 tab(s) orally once a day (24 Jan 2025 16:45)    MEDICATIONS  (STANDING):  atorvastatin 80 milliGRAM(s) Oral at bedtime  cefTRIAXone Injectable. 1000 milliGRAM(s) IV Push every 24 hours  dextrose 5%. 1000 milliLiter(s) (50 mL/Hr) IV Continuous <Continuous>  dextrose 5%. 1000 milliLiter(s) (100 mL/Hr) IV Continuous <Continuous>  dextrose 50% Injectable 25 Gram(s) IV Push once  dextrose 50% Injectable 12.5 Gram(s) IV Push once  dextrose 50% Injectable 25 Gram(s) IV Push once  doxazosin 4 milliGRAM(s) Oral at bedtime  fluticasone propionate/ salmeterol 100-50 MICROgram(s) Diskus 1 Dose(s) Inhalation two times a day  furosemide   Injectable 40 milliGRAM(s) IV Push daily  glucagon  Injectable 1 milliGRAM(s) IntraMuscular once  insulin glargine Injectable (LANTUS) 20 Unit(s) SubCutaneous at bedtime  insulin lispro (ADMELOG) corrective regimen sliding scale   SubCutaneous three times a day before meals  insulin lispro (ADMELOG) corrective regimen sliding scale   SubCutaneous at bedtime  sulfaSALAzine 500 milliGRAM(s) Oral daily    MEDICATIONS  (PRN):  acetaminophen     Tablet .. 650 milliGRAM(s) Oral every 6 hours PRN Mild Pain (1 - 3)  acetaminophen   IVPB .. 1000 milliGRAM(s) IV Intermittent once PRN Mild Pain (1 - 3)  dextrose Oral Gel 15 Gram(s) Oral once PRN Blood Glucose LESS THAN 70 milliGRAM(s)/deciliter  melatonin 3 milliGRAM(s) Oral at bedtime PRN Insomnia  ondansetron Injectable 4 milliGRAM(s) IV Push every 6 hours PRN Nausea and/or Vomiting  mirabegron 25 mg oral tablet, extended release: 1 tab(s) orally once a day (24 Jan 2025 16:45)  potassium chloride 8 mEq (600 mg) oral capsule, extended release: 1 cap(s) orally every other day ***ON HOLD since Monday 1/20*** (24 Jan 2025 16:41)  PriLOSEC OTC 20 mg oral delayed release tablet: 1 tab(s) orally 2 times a day- OTC (24 Jan 2025 16:42)  sulfaSALAzine 500 mg oral tablet: 1 tab(s) orally every other day (24 Jan 2025 16:42)  terazosin 5 mg oral capsule: 1 cap(s) orally once a day (at bedtime) (24 Jan 2025 16:42)  Vitamin D3 50,000 intl units oral capsule: 1 cap(s) orally every 2 weeks (24 Jan 2025 16:42)        Allergies - No Known Allergies    Vital Signs Last 24 Hrs  T(C): 36.7 (25 Jan 2025 08:06), Max: 37.2 (24 Jan 2025 15:00)  T(F): 98.1 (25 Jan 2025 08:06), Max: 99 (24 Jan 2025 15:00)  HR: 88 (25 Jan 2025 08:44) (68 - 88)  BP: 134/69 (25 Jan 2025 08:06) (121/62 - 139/62)  BP(mean): --  RR: 16 (25 Jan 2025 08:06) (16 - 19)  SpO2: 92% (25 Jan 2025 08:44) (91% - 94%)    Parameters below as of 25 Jan 2025 08:44  Patient On (Oxygen Delivery Method): room air        I&O's Summary    23 Jan 2025 07:01  -  24 Jan 2025 07:00  --------------------------------------------------------  IN: 01074 mL / OUT: 06435 mL / NET: -7800 mL    24 Jan 2025 07:01  -  24 Jan 2025 17:23  --------------------------------------------------------  IN: 6000 mL / OUT: 35932 mL / NET: -4650 mL        PHYSICAL EXAM:  Constitutional: NAD, awake and alert  HEENT:  EOMI,  Pupils round, No oral cyanosis.  Pulmonary: Non-labored, rales left base, No wheezing, rales or rhonchi  Cardiovascular: S1 and S2, regular rate and rhythm, no Murmurs, gallops or rubs  Gastrointestinal: Bowel Sounds present, soft, nontender.   Lymph: 1-+2 B/L LE edema  Neurological: Alert, no focal deficits  Skin: No rashes.  Psych:  Mood & affect appropriate    LABS: reviewed                           12.5   7.21  )-----------( 106      ( 25 Jan 2025 07:47 )             39.0                           11.8   7.11  )-----------( 136      ( 24 Jan 2025 07:53 )             35.9                         13.6   6.46  )-----------( 145      ( 23 Jan 2025 14:45 )             40.9     01-25    124[L]  |  94[L]  |  16  ----------------------------<  103[H]  4.8   |  27  |  0.86    Ca    9.6      25 Jan 2025 07:47  Phos  3.2     01-24  Mg     1.8     01-24    TPro  5.6[L]  /  Alb  2.1[L]  /  TBili  0.4  /  DBili  x   /  AST  17  /  ALT  8[L]  /  AlkPhos  71  01-25 24 Jan 2025 06:37    124    |  95     |  12     ----------------------------<  107    4.6     |  27     |  0.77   23 Jan 2025 14:45    125    |  89     |  12     ----------------------------<  126    4.6     |  31     |  0.86     Ca    9.4        24 Jan 2025 06:37  Ca    10.2       23 Jan 2025 14:45  Phos  3.2       24 Jan 2025 06:37  Mg     1.8       24 Jan 2025 06:37    TPro  6.0    /  Alb  2.6    /  TBili  0.7    /  DBili  x      /  AST  23     /  ALT  9      /  AlkPhos  78     23 Jan 2025 14:45    PT/INR - ( 23 Jan 2025 14:45 )   PT: 14.6 sec;   INR: 1.24 ratio         PTT - ( 23 Jan 2025 14:45 )  PTT:32.5 sec      Radiology/EKG/TTE: reviewed \    < from: Xray Chest 1 View- PORTABLE-Urgent (Xray Chest 1 View- PORTABLE-Urgent .) (12.24.24 @ 16:00) >  IMPRESSION:  1. Cardiomegaly with unipolar pacemaker lead in the right ventricle,   central pulmonary venous congestion and bibasilar pleural effusions, left   greater than right, where there is also suspected left lower lobe   atelectasis. These findings are increased from the prior exam.    < end of copied text >      < from: CT Abdomen and Pelvis No Cont (01.23.25 @ 16:23) >    IMPRESSION:  Bilateral pleural effusions.    Pericardial effusion. Cardiomegaly.    Bilateral renal cysts, some of which with rim calcification. Right renal   mass. Also bladder clot, cannot exclude mass. Recommend CT urogram if   feasible.    Diverticulosis.    Extensive atherosclerosis.    < end of copied text >      < from: US Duplex Venous Upper Ext Ltd, Left (01.25.25 @ 10:46) >    IMPRESSION:  No evidence of left upper extremity deep venous thrombosis.    < end of copied text >

## 2025-01-25 NOTE — CONSULT NOTE ADULT - SUBJECTIVE AND OBJECTIVE BOX
NEPHROLOGY INTERVAL HPI/OVERNIGHT EVENTS:  [id number]  HPI:  Patient is an 89 y/o M with a PMH of A-fib on Eliquis, GERD, hypertension, hyperlipidemia, CVA, diabetes, rectal CA with chronic diarrhea from enemas, atonic bladder with self-catheterizations, status post permanent pacemaker for bradycardia and syncope and persistent known moderate sized pericardial effusion presenting to  ED on 1/23/25 for suprapubic pain.   hx from daughter at bedside  has hx of pericardial effusion s/p windon in 10/24 at [facility]  hx of pleural effusion s/p thoracentesis 10/24  has been on lasix with na of 121 and k of 5.7 on 1/21   advised byhis cardiologist to dc his lasix and start nacl tabs with dc of kcl tabs  restarted on lasix 40 qd  CTS surgery for eval fo thoracentesis   chronic robertson   CBI in progress   minimal po intake including supplements     PAST MEDICAL & SURGICAL HISTORY:  - htn   - rectal ca   - bph with chronic robertson   - diast chf  - hx of pericardial effusion s/p window  - hx of pleural effusion     FAMILY HISTORY:    MEDICATIONS  (STANDING):  atorvastatin 80 milliGRAM(s) Oral at bedtime  cefTRIAXone Injectable. 1000 milliGRAM(s) IV Push every 24 hours  dextrose 5%. 1000 milliLiter(s) (50 mL/Hr) IV Continuous <Continuous>  dextrose 5%. 1000 milliLiter(s) (100 mL/Hr) IV Continuous <Continuous>  dextrose 50% Injectable 25 Gram(s) IV Push once  dextrose 50% Injectable 12.5 Gram(s) IV Push once  dextrose 50% Injectable 25 Gram(s) IV Push once  doxazosin 4 milliGRAM(s) Oral at bedtime  fluticasone propionate/ salmeterol 100-50 MICROgram(s) Diskus 1 Dose(s) Inhalation two times a day  furosemide   Injectable 40 milliGRAM(s) IV Push daily  glucagon  Injectable 1 milliGRAM(s) IntraMuscular once  insulin glargine Injectable (LANTUS) 20 Unit(s) SubCutaneous at bedtime  insulin lispro (ADMELOG) corrective regimen sliding scale   SubCutaneous three times a day before meals  insulin lispro (ADMELOG) corrective regimen sliding scale   SubCutaneous at bedtime  sulfaSALAzine 500 milliGRAM(s) Oral daily    MEDICATIONS  (PRN):  acetaminophen     Tablet .. 650 milliGRAM(s) Oral every 6 hours PRN Mild Pain (1 - 3)  dextrose Oral Gel 15 Gram(s) Oral once PRN Blood Glucose LESS THAN 70 milliGRAM(s)/deciliter  melatonin 3 milliGRAM(s) Oral at bedtime PRN Insomnia  ondansetron Injectable 4 milliGRAM(s) IV Push every 6 hours PRN Nausea and/or Vomiting      Allergies    No Known Allergies    Intolerances        I&O's Summary    23 Jan 2025 07:01  -  24 Jan 2025 07:00  --------------------------------------------------------  IN: 51548 mL / OUT: 06571 mL / NET: -7800 mL    24 Jan 2025 07:01  -  24 Jan 2025 15:55  --------------------------------------------------------  IN: 6000 mL / OUT: 9950 mL / NET: -3950 mL        Home Medications:  atorvastatin 80 mg oral tablet: 1 tab(s) orally once a day (at bedtime) (26 Jun 2021 12:32)  cefpodoxime 200 mg oral tablet: 1 tab(s) orally every 12 hours (26 Jun 2021 12:32)  CeleBREX 400 mg oral capsule: 1 cap(s) orally once a day (23 Jan 2025 22:53)  chlordiazepoxide-clidinium 5 mg-2.5 mg oral capsule: 1 cap(s) orally 2 times a day (26 Jun 2021 12:32)  Cranberry oral capsule: 400 milligram(s) orally once a day (26 Jun 2021 12:32)  Dulera 100 mcg-5 mcg/inh inhalation aerosol: 2 puff(s) inhaled 2 times a day (26 Jun 2021 12:32)  Eliquis 5 mg oral tablet: 1 tab(s) orally 2 times a day (26 Jun 2021 12:32)  Lant Solostar Pen 100 units/mL subcutaneous solution: 20 unit(s) subcutaneous once a day (26 Jun 2021 12:32)  Lasix 20 mg oral tablet: 1 tab(s) orally 2 times a day (26 Jun 2021 12:32)  opium (equivalent to morphine 10 mg/mL) (Opium Tincture) oral tincture: 2 dose(s) orally once a day (26 Jun 2021 12:32)  potassium chloride 8 mEq (600 mg) oral capsule, extended release: 1 cap(s) orally every other day (26 Jun 2021 12:32)  PriLOSEC OTC 20 mg oral delayed release tablet: 1 tab(s) orally 2 times a day- OTC (26 Jun 2021 12:32)  sulfaSALAzine 500 mg oral tablet: 1 tab(s) orally once a day (26 Jun 2021 12:32)  terazosin 5 mg oral capsule: 1 cap(s) orally once a day (at bedtime) (26 Jun 2021 12:32)  Vitamin C 500 mg oral tablet: 1 tab(s) orally once a day (26 Jun 2021 12:32)  Vitamin D3 50,000 intl units oral capsule: 1 cap(s) orally every 2 weeks (26 Jun 2021 12:32)        Vital Signs Last 24 Hrs  T(C): 37.2 (24 Jan 2025 15:00), Max: 37.2 (24 Jan 2025 15:00)  T(F): 99 (24 Jan 2025 15:00), Max: 99 (24 Jan 2025 15:00)  HR: 87 (24 Jan 2025 15:00) (70 - 87)  BP: 139/62 (24 Jan 2025 15:00) (110/42 - 139/62)  BP(mean): 87 (23 Jan 2025 18:37) (87 - 87)  RR: 19 (24 Jan 2025 15:00) (17 - 20)  SpO2: 91% (24 Jan 2025 15:00) (91% - 96%)    Parameters below as of 24 Jan 2025 15:00  Patient On (Oxygen Delivery Method): room air      Daily     Daily   I&O's Summary    23 Jan 2025 07:01  -  24 Jan 2025 07:00  --------------------------------------------------------  IN: 91565 mL / OUT: 11085 mL / NET: -7800 mL    24 Jan 2025 07:01  -  24 Jan 2025 15:55  --------------------------------------------------------  IN: 6000 mL / OUT: 9950 mL / NET: -3950 mL        PHYSICAL EXAM:  GEN: alert awake NAD  HEENT: MMM  NECK supple no jvd  CV: RRR s1s2  LUNGS: b/l CTA  ABD: +bs soft, nt/nd  EXT: no edema    LABS:                        11.8   7.11  )-----------( 136      ( 24 Jan 2025 07:53 )             35.9     01-24    124[L]  |  95[L]  |  12  ----------------------------<  107[H]  4.6   |  27  |  0.77    Ca    9.4      24 Jan 2025 06:37  Phos  3.2     01-24  Mg     1.8     01-24    TPro  6.0  /  Alb  2.6[L]  /  TBili  0.7  /  DBili  x   /  AST  23  /  ALT  9[L]  /  AlkPhos  78  01-23    PT/INR - ( 23 Jan 2025 14:45 )   PT: 14.6 sec;   INR: 1.24 ratio         PTT - ( 23 Jan 2025 14:45 )  PTT:32.5 sec  Urinalysis Basic - ( 24 Jan 2025 06:37 )    Color: x / Appearance: x / SG: x / pH: x  Gluc: 107 mg/dL / Ketone: x  / Bili: x / Urobili: x   Blood: x / Protein: x / Nitrite: x   Leuk Esterase: x / RBC: x / WBC x   Sq Epi: x / Non Sq Epi: x / Bacteria: x      Magnesium: 1.8 mg/dL (01-24 @ 06:37)  Phosphorus: 3.2 mg/dL (01-24 @ 06:37)      
Patient is a 89y old  Male who presents with a chief complaint of hematuria   UTI   pleural effusions     HPI:  89 y/o Male with h/o A-fib on Eliquis, GERD, hypertension, hyperlipidemia, old CVA, diabetes, rectal CA with chronic diarrhea, atonic bladder s/plf-catheterizations, PM for bradycardia and syncope and persistent moderate pericardial effusion, urinary retention with Robertson cath was on 25 for suprapubic pain. He has a chronic Robertson that was placed in [facility] on 24. Robertson was functioning well until the morning of admission when the home health aide noted bright red blood in the bag. Patient also complaining of lower abdominal pain. No fever or back pain. In ER the robertson was changed and large amount of small clots removed. In ER he received ceftriaxone. He was reported with new bacteriuria.        Prior to having an indwelling Robertson placed in 2024 patient would self cath daily (6 times a day).     Additional history obtained from home health aide. She mentions he was admitted to [facility] in 2024. There were bilateral pleural effusions. Patient was admitted to [facility] and started on IV diuretics. He then underwent pericardial window and thoracentesis. Post operative course complicated by abdominal pain and distention patient developed [place]'s syndrome. Patient was treated with the decompression colonoscopy.   Aide noted increased bilateral lower extremities, bilateral arms and scrotum edema. She also mentions he is bedbound but appears more fatigued and short of breath. Family is very cautious about using opiates because of his history of constipation. He requires a daily enema in the AM to have a bowel movement.  (2025 01:02)    PMH: as above  PSH: as above  Meds: per reconciliation sheet, noted below  MEDICATIONS  (STANDING):  atorvastatin 80 milliGRAM(s) Oral at bedtime  dextrose 5%. 1000 milliLiter(s) (50 mL/Hr) IV Continuous <Continuous>  dextrose 5%. 1000 milliLiter(s) (100 mL/Hr) IV Continuous <Continuous>  dextrose 50% Injectable 25 Gram(s) IV Push once  dextrose 50% Injectable 12.5 Gram(s) IV Push once  dextrose 50% Injectable 25 Gram(s) IV Push once  doxazosin 4 milliGRAM(s) Oral at bedtime  fluticasone propionate/ salmeterol 100-50 MICROgram(s) Diskus 1 Dose(s) Inhalation two times a day  furosemide   Injectable 40 milliGRAM(s) IV Push daily  glucagon  Injectable 1 milliGRAM(s) IntraMuscular once  insulin glargine Injectable (LANTUS) 20 Unit(s) SubCutaneous at bedtime  insulin lispro (ADMELOG) corrective regimen sliding scale   SubCutaneous three times a day before meals  insulin lispro (ADMELOG) corrective regimen sliding scale   SubCutaneous at bedtime  metoprolol succinate ER 25 milliGRAM(s) Oral daily  sulfaSALAzine 500 milliGRAM(s) Oral daily    MEDICATIONS  (PRN):  acetaminophen     Tablet .. 650 milliGRAM(s) Oral every 6 hours PRN Mild Pain (1 - 3)  acetaminophen   IVPB .. 1000 milliGRAM(s) IV Intermittent once PRN Mild Pain (1 - 3)  dextrose Oral Gel 15 Gram(s) Oral once PRN Blood Glucose LESS THAN 70 milliGRAM(s)/deciliter  melatonin 3 milliGRAM(s) Oral at bedtime PRN Insomnia  ondansetron Injectable 4 milliGRAM(s) IV Push every 6 hours PRN Nausea and/or Vomiting    Allergies    No Known Allergies    Intolerances      Social: no smoking, no alcohol, no illegal drugs; no recent travel, no exposure to TB  FAMILY HISTORY:    no history of premature cardiovascular disease in first degree relatives    ROS: the patient is confused, limited, denies HA, no CP, no palpitations, no SOB, no cough, no abdominal pain, no diarrhea, no N/V, no dysuria, no leg pain, no rash, no joint aches, no rectal pain or bleeding, no night sweats  All other systems reviewed and are negative    Vital Signs Last 24 Hrs  T(C): 36.7 (2025 08:06), Max: 36.7 (2025 08:06)  T(F): 98.1 (2025 08:06), Max: 98.1 (2025 08:06)  HR: 88 (2025 08:44) (68 - 88)  BP: 134/69 (2025 08:06) (121/62 - 134/69)  BP(mean): --  RR: 16 (2025 08:06) (16 - 16)  SpO2: 92% (2025 08:44) (91% - 94%)    Parameters below as of 2025 08:44  Patient On (Oxygen Delivery Method): room air      Daily     Daily Weight in k.1 (2025 06:06)    PE:    Constitutional:  No acute distress  HEENT: NC/AT, EOMI, PERRLA, conjunctivae clear; ears and nose atraumatic; pharynx benign  Neck: supple; thyroid not palpable  Back: no tenderness  Respiratory: respiratory effort normal; clear to auscultation  Cardiovascular: S1S2 regular, no murmurs  Abdomen: soft, not tender, not distended, positive BS; no liver or spleen organomegaly  Genitourinary: no suprapubic tenderness  Lymphatic: no LN palpable  Musculoskeletal: no muscle tenderness, no joint swelling or tenderness  Extremities: no pedal edema  Neurological/ Psychiatric: Alert, judgement and insight impaired; moving all extremities  Skin: no rashes; no palpable lesions    Labs: all available labs reviewed                        12.5   7.21  )-----------( 106      ( 2025 07:47 )             39.0         124[L]  |  94[L]  |  16  ----------------------------<  103[H]  4.8   |  27  |  0.86    Ca    9.6      2025 07:47  Phos  3.2     -24  Mg     1.8     -24    TPro  5.6[L]  /  Alb  2.1[L]  /  TBili  0.4  /  DBili  x   /  AST  17  /  ALT  8[L]  /  AlkPhos  71  -25     LIVER FUNCTIONS - ( 2025 07:47 )  Alb: 2.1 g/dL / Pro: 5.6 gm/dL / ALK PHOS: 71 U/L / ALT: 8 U/L / AST: 17 U/L / GGT: x           Culture - Blood (collected 2025 15:45)  Source: .Blood BLOOD  Preliminary Report (2025 22:01):    No growth at 24 hours    Culture - Blood (collected 2025 15:45)  Source: .Blood BLOOD  Preliminary Report (2025 22:01):    No growth at 24 hours    Urinalysis with Rflx Culture (collected 2025 15:19)    Culture - Urine (collected 2025 15:19)  Source: Catheterized None  Preliminary Report (2025 14:15):    50,000 - 99,000 CFU/mL Pseudomonas aeruginosa    50,000 - 99,000 CFU/mL Enterococcus faecalis    Radiology: all available radiological tests reviewed    < from: CT Abdomen and Pelvis No Cont (25 @ 16:23) >  Bilateral pleural effusions.  Pericardial effusion. Cardiomegaly.  Bilateral renal cysts, some of which with rim calcification. Right renal mass. Also bladder clot, cannot exclude mass. Recommend CT urogram if feasible.  Diverticulosis.  Extensive atherosclerosis.  < end of copied text >    < from: Xray Chest 1 View- PORTABLE-Urgent (Xray Chest 1 View- PORTABLE-Urgent .) (24 @ 16:00) >  1. Cardiomegaly with unipolar pacemaker lead in the right ventricle, central pulmonary venous congestion and bibasilar pleural effusions, left greater than right, where there is also suspected left lower lobe atelectasis. These findings are increased from the prior exam.  < end of copied text >    Advanced directives addressed: full resuscitation
Patient is a 89y old  Male who presents with a chief complaint of hematuria   UTI   pleural effusions (24 Jan 2025 17:18)      HPI:  Patient is an 89 y/o M with a PMH of A-fib on Eliquis, GERD, hypertension, hyperlipidemia, CVA, diabetes, rectal CA with chronic diarrhea from enemas, atonic bladder with self-catheterizations, status post permanent pacemaker for bradycardia and syncope and persistent known moderate sized pericardial effusion presenting to  ED on 1/23/25 for suprapubic pain.     He has a chronic Crump that was placed in [facility] on 12/24/24. Crump was functioning well until the morning of admission when the home health aide noted bright red blood in the bag. Patient also complaining of lower abdominal pain.  Patient is on daily Eliquis.  No fevers.  Normal p.o. intake.  No back pain.  Denies history of kidney stones.      Prior to having an indwelling Crump placed in December 2024 patient would self cath daily (6 times a day).       Upon arrival to the ED, vitals were /78 HR 82 RR 20 T97.1F and SpO2 of 96% on room air. Labs significant for Na 125, UA with large LE, positive nitrite, TNTC WBC TNTC RBC and moderate bacteria. CT abdomen and pelvis without contrast showed bilateral pleural effusions. Pericardial effusion. Cardiomegaly.Bilateral renal cysts, some of which with rim calcification. Right renal mass. Also bladder clot, cannot exclude mass. Recommend CT urogram if feasible. He received 1L NS bolus, IV Zofran 4mg x 1, IV Tylenol 1g x 1 and IV CFTX 1g x 1. Crump replaced in the ED and large amount of small clots removed. Subsequently, Crump draining clear fluid without issues.     Additional history obtained from home health aide present at bedside. She mentions he was admitted to [facility] in October 2024. There were bilateral pleural effusions. Patient was admitted to [facility] and started on IV diuretics. He then underwent pericardial window and thoracentesis. Post operative course complicated by abdominal pain and distention patient developed [place]'s syndrome. Patient was treated with the decompression colonoscopy.     Aide mentions he appears much more fluid overloaded than his baseline. Noticeable in his bilateral lower extremities, bilateral arms and scrotum. She also mentions he is bedbound but appears more fatigued and short of breath at times. Samanta says she was instructed by patient's daughter to hold off on his home Lasix dose today because his cardiologist said his Na level is low. He usually takes furosemide 20 mg BID. Last dose of furosemide was Wednesday (1/22/24) AM.     Currently, patient is reporting some suprapubic pain. He received IV Tylenol in the ED. Family is very cautious about using opiates because of his history of constipation. He requires a daily enema in the AM to have a bowel movement.  (24 Jan 2025 01:02)    Pt seen at bedside, Urine color clear light yellow.       PAST MEDICAL & SURGICAL HISTORY:  Hypertension      Cancer of rectum      RBBB (right bundle branch block)      Asthma      TIA (transient ischemic attack)      UTI (urinary tract infection)      Enlarged prostate      Urinary retention with incomplete bladder emptying      Spinal stenosis      Chronic GERD      Rectal tumor      S/P TURP          REVIEW OF SYSTEMS:    CONSTITUTIONAL:  fevers or chills  HEENT: No visual changes  ENDO: No sweating  NECK: No pain or stiffness  MUSCULOSKELETAL: No back pain, no joint pain  RESPIRATORY: No shortness of breath  CARDIOVASCULAR: No chest pain  GASTROINTESTINAL: No abdominal or epigastric pain. No nausea, vomiting,  No diarrhea or constipation.   NEUROLOGICAL: No mental status changes  PSYCH: No depression, no mood changes  SKIN: No itching      MEDICATIONS  (STANDING):  atorvastatin 80 milliGRAM(s) Oral at bedtime  cefTRIAXone Injectable. 1000 milliGRAM(s) IV Push every 24 hours  dextrose 5%. 1000 milliLiter(s) (50 mL/Hr) IV Continuous <Continuous>  dextrose 5%. 1000 milliLiter(s) (100 mL/Hr) IV Continuous <Continuous>  dextrose 50% Injectable 25 Gram(s) IV Push once  dextrose 50% Injectable 12.5 Gram(s) IV Push once  dextrose 50% Injectable 25 Gram(s) IV Push once  doxazosin 4 milliGRAM(s) Oral at bedtime  fluticasone propionate/ salmeterol 100-50 MICROgram(s) Diskus 1 Dose(s) Inhalation two times a day  furosemide   Injectable 40 milliGRAM(s) IV Push daily  glucagon  Injectable 1 milliGRAM(s) IntraMuscular once  insulin glargine Injectable (LANTUS) 20 Unit(s) SubCutaneous at bedtime  insulin lispro (ADMELOG) corrective regimen sliding scale   SubCutaneous three times a day before meals  insulin lispro (ADMELOG) corrective regimen sliding scale   SubCutaneous at bedtime  sulfaSALAzine 500 milliGRAM(s) Oral daily    MEDICATIONS  (PRN):  acetaminophen     Tablet .. 650 milliGRAM(s) Oral every 6 hours PRN Mild Pain (1 - 3)  acetaminophen   IVPB .. 1000 milliGRAM(s) IV Intermittent once PRN Mild Pain (1 - 3)  dextrose Oral Gel 15 Gram(s) Oral once PRN Blood Glucose LESS THAN 70 milliGRAM(s)/deciliter  melatonin 3 milliGRAM(s) Oral at bedtime PRN Insomnia  ondansetron Injectable 4 milliGRAM(s) IV Push every 6 hours PRN Nausea and/or Vomiting      Allergies    No Known Allergies    Intolerances        SOCIAL HISTORY: No illicit drug use    FAMILY HISTORY:      Vital Signs Last 24 Hrs  T(C): 37.2 (24 Jan 2025 15:00), Max: 37.2 (24 Jan 2025 15:00)  T(F): 99 (24 Jan 2025 15:00), Max: 99 (24 Jan 2025 15:00)  HR: 87 (24 Jan 2025 15:00) (70 - 87)  BP: 139/62 (24 Jan 2025 15:00) (110/42 - 139/62)  BP(mean): 87 (23 Jan 2025 18:37) (87 - 87)  RR: 19 (24 Jan 2025 15:00) (17 - 20)  SpO2: 91% (24 Jan 2025 15:00) (91% - 96%)    Parameters below as of 24 Jan 2025 15:00  Patient On (Oxygen Delivery Method): room air        PHYSICAL EXAM:    Constitutional: NAD  Respiratory: No accessory respiratory muscle use  Abd: Soft, NT/ND  no organomegally  no hernia  : Crump in place CBI running, Clear urine in bag  Extremities: no edema  Neurological: A/O x 3  Psychiatric: Normal mood, normal affect  Skin: No rashes    I&O's Summary    23 Jan 2025 07:01  -  24 Jan 2025 07:00  --------------------------------------------------------  IN: 68891 mL / OUT: 54936 mL / NET: -7800 mL    24 Jan 2025 07:01  -  24 Jan 2025 18:09  --------------------------------------------------------  IN: 6000 mL / OUT: 05462 mL / NET: -4650 mL        LABS:                        11.8   7.11  )-----------( 136      ( 24 Jan 2025 07:53 )             35.9     01-24    124[L]  |  95[L]  |  12  ----------------------------<  107[H]  4.6   |  27  |  0.77    Ca    9.4      24 Jan 2025 06:37  Phos  3.2     01-24  Mg     1.8     01-24    TPro  6.0  /  Alb  2.6[L]  /  TBili  0.7  /  DBili  x   /  AST  23  /  ALT  9[L]  /  AlkPhos  78  01-23    PT/INR - ( 23 Jan 2025 14:45 )   PT: 14.6 sec;   INR: 1.24 ratio         PTT - ( 23 Jan 2025 14:45 )  PTT:32.5 sec  Urinalysis Basic - ( 24 Jan 2025 06:37 )    Color: x / Appearance: x / SG: x / pH: x  Gluc: 107 mg/dL / Ketone: x  / Bili: x / Urobili: x   Blood: x / Protein: x / Nitrite: x   Leuk Esterase: x / RBC: x / WBC x   Sq Epi: x / Non Sq Epi: x / Bacteria: x      Urine Culture: Pending        RADIOLOGY & ADDITIONAL STUDIES: < from: CT Abdomen and Pelvis No Cont (01.23.25 @ 16:23) >  Bilateral pleural effusions.    Pericardial effusion. Cardiomegaly.    Bilateral renal cysts, some of which with rim calcification. Right renal   mass. Also bladder clot, cannot exclude mass. Recommend CT urogram if   feasible.    Diverticulosis.    Extensive atherosclerosis.      
  HPI: Pt is a 89y old Male with hx  of fib on Eliquis, GERD, hypertension, hyperlipidemia, CVA, diabetes, rectal CA with chronic diarrhea from enemas, atonic bladder with self-catheterizations, status post permanent pacemaker for bradycardia and syncope and persistent known moderate sized pericardial effusion presenting to  ED on 1/23/25 for suprapubic pain. He has a chronic Robertson that was placed in [facility] on 12/24/24. Robertson was functioning well until the morning of admission when the home health aide noted bright red blood in the bag. Patient also complaining of lower abdominal pain.  Patient is on daily Eliquis.  Prior to having an indwelling Robertson placed in December 2024 patient would self cath daily (6 times a day). CT abd without contrast showed bilateral pleural effusions. Pericardial effusion. Cardiomegaly .Bilateral renal cysts, some of which with rim calcification. Right renal mass. Also bladder clot, cannot exclude mass. Additional history obtained from home health aide present at bedside. She mentions he was admitted to [facility] in October 2024. There were bilateral pleural effusions. Patient was admitted to [facility] and started on IV diuretics. He then underwent pericardial window and thoracentesis. Post operative course complicated by abdominal pain and distention patient developed [place]'s syndrome. Patient was treated with the decompression colonoscopy.   Patient is reporting some suprapubic pain. He received IV Tylenol in the ED. Family is very cautious about using opiates because of his history of constipation. He requires a daily enema in the AM to have a bowel movement.   Palliative medicine Consult to further establish GOC  1/24/25 Seen and examined at bedside. Currently denies pain or dyspnea. Able to provide some medical hx although limited    PAIN: ( )Yes   ( X)No  None currently  Level: mod abd pain on adm  Location: lower abd  Intensity:  ? /10  Aggravating Factors: unknown  Alleviating Factors: Tylenol IV  Impact on ADLs: mod    DYSPNEA: ( ) Yes  (X ) No    PAST MEDICAL & SURGICAL HISTORY:  Hypertension  Cancer of rectum  RBBB (right bundle branch block)  Asthma  TIA (transient ischemic attack)  UTI (urinary tract infection)  Enlarged prostate  Urinary retention with incomplete bladder emptying  Spinal stenosis  Chronic GERD  Rectal tumor  S/P TURP    SOCIAL HX:  Lives home with aide  Hx opiate tolerance ( )YES  ( X)NO    Baseline ADLs  (Prior to Admission)  ( ) Independent   (X )Dependent    FAMILY HISTORY:  Unable to obtain/ poor historian    Review of Systems:  Physical Discomfort-mod  Dyspnea-denies  Constipation-chronic  Anorexia-mod  Fatigue-mod  Weakness-mod    All other systems reviewed and negative  Unable to obtain/Limited due to: poor historian      PHYSICAL EXAM:    Vital Signs Last 24 Hrs  T(C): 37 (23 Jan 2025 21:14), Max: 37 (23 Jan 2025 21:14)  T(F): 98.6 (23 Jan 2025 21:14), Max: 98.6 (23 Jan 2025 21:14)  HR: 81 (24 Jan 2025 08:22) (70 - 82)  BP: 118/54 (23 Jan 2025 21:14) (118/54 - 136/78)  BP(mean): 87 (23 Jan 2025 18:37) (87 - 87)  RR: 18 (23 Jan 2025 21:14) (17 - 20)  SpO2: 92% (24 Jan 2025 08:22) (92% - 96%)  Parameters below as of 24 Jan 2025 08:22  Patient On (Oxygen Delivery Method): room air      Daily Height in cm: 177.8 (23 Jan 2025 13:59)      PPSV2: 20 %      General: Elderly male in bed in NAD  Mental Status: A&O X3/poor recall  HEENT: oral mucosa dry  Lungs: clear to auscultation josselyn  Cardiac: S1S2+  GI: abd soft NT ND + BS  : robertson/CBI  Ext: moves all extremities   Neuro: no focal def      LABS:                        11.8   7.11  )-----------( 136      ( 24 Jan 2025 07:53 )             35.9     01-24    124[L]  |  95[L]  |  12  ----------------------------<  107[H]  4.6   |  27  |  0.77    Ca    9.4      24 Jan 2025 06:37  Phos  3.2     01-24  Mg     1.8     01-24    TPro  6.0  /  Alb  2.6[L]  /  TBili  0.7  /  DBili  x   /  AST  23  /  ALT  9[L]  /  AlkPhos  78  01-23    PT/INR - ( 23 Jan 2025 14:45 )   PT: 14.6 sec;   INR: 1.24 ratio         PTT - ( 23 Jan 2025 14:45 )  PTT:32.5 sec  Albumin: Albumin: 2.6 g/dL (01-23 @ 14:45)      Allergies    No Known Allergies    Intolerances      MEDICATIONS  (STANDING):  atorvastatin 80 milliGRAM(s) Oral at bedtime  cefTRIAXone Injectable. 1000 milliGRAM(s) IV Push every 24 hours  dextrose 5%. 1000 milliLiter(s) (50 mL/Hr) IV Continuous <Continuous>  dextrose 5%. 1000 milliLiter(s) (100 mL/Hr) IV Continuous <Continuous>  dextrose 50% Injectable 25 Gram(s) IV Push once  dextrose 50% Injectable 12.5 Gram(s) IV Push once  dextrose 50% Injectable 25 Gram(s) IV Push once  doxazosin 4 milliGRAM(s) Oral at bedtime  fluticasone propionate/ salmeterol 100-50 MICROgram(s) Diskus 1 Dose(s) Inhalation two times a day  furosemide   Injectable 40 milliGRAM(s) IV Push daily  glucagon  Injectable 1 milliGRAM(s) IntraMuscular once  insulin glargine Injectable (LANTUS) 20 Unit(s) SubCutaneous at bedtime  insulin lispro (ADMELOG) corrective regimen sliding scale   SubCutaneous three times a day before meals  insulin lispro (ADMELOG) corrective regimen sliding scale   SubCutaneous at bedtime  ketorolac   Injectable 15 milliGRAM(s) IV Push once  sulfaSALAzine 500 milliGRAM(s) Oral daily    MEDICATIONS  (PRN):  acetaminophen     Tablet .. 650 milliGRAM(s) Oral every 6 hours PRN Mild Pain (1 - 3)  dextrose Oral Gel 15 Gram(s) Oral once PRN Blood Glucose LESS THAN 70 milliGRAM(s)/deciliter  melatonin 3 milliGRAM(s) Oral at bedtime PRN Insomnia  ondansetron Injectable 4 milliGRAM(s) IV Push every 6 hours PRN Nausea and/or Vomiting      RADIOLOGY/ADDITIONAL STUDIES:  < from: CT Abdomen and Pelvis No Cont (01.23.25 @ 16:23) >    ACC: [id number] EXAM:  CT ABDOMEN AND PELVIS   ORDERED BY: MONICA JOSUE     PROCEDURE DATE:  01/23/2025          INTERPRETATION:  CLINICAL INFORMATION: Abdominal pain and hematuria.    COMPARISON: None.    CONTRAST/COMPLICATIONS:  IV Contrast: NONE  Oral Contrast: NONE  .    PROCEDURE:  CT of the Abdomen and Pelvis was performed.  Sagittal and coronal reformats were performed.    FINDINGS:    Streak artifact in the bilateral arms limits evaluation. Evaluation is   also limited with lack of contrast.  LOWER CHEST: Moderate to large bilateral pleural effusions partially   visualized with associated compressive atelectasis. Cardiomegaly.   Coronary artery calcifications. Aortic valvular calcifications. Pacer   lead noted in the right ventricle. Small to moderate pericardial effusion   noted.    LIVER: No focal mass. Normal size and contour.  BILE DUCTS: Normal caliber.  GALLBLADDER: Within normal limits.  SPLEEN: Within normal limits.  PANCREAS: Within normal limits.  ADRENALS: Within normal limits.  KIDNEYS/URETERS: Multiple bilateral renal cysts, the largest of which   contains thin rim calcification. The largest on the right measures 7.0 x   5.5 x 8.0 cm and is exophytic in the midpole. An upper pole right-sided   cyst measures 5.4 x 4.1 x 4.9 cm. Other smaller cysts are identified. In   the lower pole, there is a higher density contour deforming structure   measuring greater than simple fluid density and slightly echogenic renal   parenchyma. This measures 4.2 x 2.9 x 3.9 cm. Further evaluation with   nephro sonography or CT with renal mass protocol is recommended. On the   left, an exophytic midpole cyst measures 3.5 x 4.9 x 3.8 cm, and an upper   pole exophytic simple cyst measures 4.8 x 4.6 x 4.8 cm. Additional   smallercysts are noted bilaterally. No hydronephrosis is appreciated.    BLADDER: The bladder is partially collapsed around a Robertson catheter.   Posterior hyperdense intraluminal material is consistent with clot,   although associated mass cannot be excluded. Consideration can again be   given to CT urogram  REPRODUCTIVE ORGANS: Prostate is enlarged.    BOWEL: No bowel obstruction. Extensive sigmoid diverticulosis. No   evidence of appendicitis.  PERITONEUM/RETROPERITONEUM: Within normal limits.  VESSELS: Extensive arterial calcification.  LYMPH NODES: No lymphadenopathy.  ABDOMINAL WALL: Anasarca.  BONES: Degenerative changes of the spine. Degenerative changes of   bilateral hips. Focal sclerotic density measuring 6 mm in the left   sacrum, most likely a bone island.    IMPRESSION:  Bilateral pleural effusions.    Pericardial effusion. Cardiomegaly.    Bilateral renal cysts, some of which with rim calcification. Right renal   mass. Also bladder clot, cannot exclude mass. Recommend CT urogram if   feasible.    Diverticulosis.    Extensive atherosclerosis.    < end of copied text >    
  CHIEF COMPLAINT: Patient is a 89y old  Male who presents with a chief complaint of hematuria   UTI   pleural effusions (24 Jan 2025 15:50)      HPI:  Patient is an 89 y/o M with a PMH of A-fib on Eliquis, GERD, hypertension, hyperlipidemia, CVA, diabetes, rectal CA with chronic diarrhea from enemas, atonic bladder with self-catheterizations, status post permanent pacemaker for bradycardia and syncope and persistent known moderate sized pericardial effusion presenting to  ED on 1/23/25 for suprapubic pain.     He has a chronic Crump that was placed in [facility] on 12/24/24. Crump was functioning well until the morning of admission when the home health aide noted bright red blood in the bag. Patient also complaining of lower abdominal pain.  Patient is on daily Eliquis.  No fevers.  Normal p.o. intake.  No back pain.  Denies history of kidney stones.      Prior to having an indwelling Crump placed in December 2024 patient would self cath daily (6 times a day).       Upon arrival to the ED, vitals were /78 HR 82 RR 20 T97.1F and SpO2 of 96% on room air. Labs significant for Na 125, UA with large LE, positive nitrite, TNTC WBC TNTC RBC and moderate bacteria. CT abdomen and pelvis without contrast showed bilateral pleural effusions. Pericardial effusion. Cardiomegaly.Bilateral renal cysts, some of which with rim calcification. Right renal mass. Also bladder clot, cannot exclude mass. Recommend CT urogram if feasible. He received 1L NS bolus, IV Zofran 4mg x 1, IV Tylenol 1g x 1 and IV CFTX 1g x 1. Crump replaced in the ED and large amount of small clots removed. Subsequently, Crump draining clear fluid without issues.     Additional history obtained from home health aide present at bedside. She mentions he was admitted to [facility] in October 2024. There were bilateral pleural effusions. Patient was admitted to [facility] and started on IV diuretics. He then underwent pericardial window and thoracentesis. Post operative course complicated by abdominal pain and distention patient developed [place]'s syndrome. Patient was treated with the decompression colonoscopy.     Aide mentions he appears much more fluid overloaded than his baseline. Noticeable in his bilateral lower extremities, bilateral arms and scrotum. She also mentions he is bedbound but appears more fatigued and short of breath at times. Samanta says she was instructed by patient's daughter to hold off on his home Lasix dose today because his cardiologist said his Na level is low. He usually takes furosemide 20 mg BID. Last dose of furosemide was Wednesday (1/22/24) AM.     Currently, patient is reporting some suprapubic pain. He received IV Tylenol in the ED. Family is very cautious about using opiates because of his history of constipation. He requires a daily enema in the AM to have a bowel movement.  (24 Jan 2025 01:02)    Cardiology consulted for HF (unknown EF), CT with pericardial effusion and B/L pleural effusion. Pt. with mild SOB, +2 B/L LE edema, Family at bedside - says his edema is much better at present, Echo performed,     PAST MEDICAL & SURGICAL HISTORY:  Pericardial effusion s/p pericardiocentesis 10/24 - [facility]   Pleural effusions s/p thoracentesis 10/24 - [facility]   Bradycardia s/p PPM   Hypertension  Cancer of rectum  RBBB (right bundle branch block)  Asthma  TIA (transient ischemic attack)  UTI (urinary tract infection)  Enlarged prostate  Urinary retention with incomplete bladder emptying  Spinal stenosis  Chronic GERD  Rectal tumor  S/P TURP      SOCIAL HISTORY:   Alcohol: Denied  Smoking: Nonsmoker  Drug Use: Denied  Marital Status:     FAMILY HISTORY: non contributory     Home Medications:  albuterol 2.5 mg/3 mL (0.083%) inhalation solution: 3 milliliter(s) by nebulizer 1 to 2 times a day (24 Jan 2025 16:45)  atorvastatin 80 mg oral tablet: 1 tab(s) orally once a day (at bedtime) (24 Jan 2025 16:42)  cefpodoxime 200 mg oral tablet: 1 tab(s) orally every 12 hours ***ON HOLD while at *** (24 Jan 2025 16:39)  CeleBREX 400 mg oral capsule: 1 cap(s) orally once a day (23 Jan 2025 22:53)  chlordiazepoxide-clidinium 5 mg-2.5 mg oral capsule: 1 cap(s) orally 2 times a day (24 Jan 2025 16:42)  clonazePAM 2 mg oral tablet: 1 tab(s) orally once a day as needed for  anxiety (24 Jan 2025 16:45)  Dulera 100 mcg-5 mcg/inh inhalation aerosol: 2 puff(s) inhaled 2 times a day (24 Jan 2025 16:42)  Eliquis 2.5 mg oral tablet: 1 tab(s) orally 2 times a day ***ON HOLD while at *** (24 Jan 2025 16:45)  Lantus Solostar Pen 100 units/mL subcutaneous solution: 20 unit(s) subcutaneous once a day as needed for BG &gt; 150 , for BG &lt; 150 HOLD dose (24 Jan 2025 16:40)  Lasix 20 mg oral tablet: 1 tab(s) orally 2 times a day ***ON HOLD since Monday 1/20*** (24 Jan 2025 16:41)  Linzess 145 mcg oral capsule: 1 cap(s) orally once a day (24 Jan 2025 16:45)  metoprolol succinate 25 mg oral tablet, extended release: 1 tab(s) orally once a day (24 Jan 2025 16:45)  mirabegron 25 mg oral tablet, extended release: 1 tab(s) orally once a day (24 Jan 2025 16:45)  potassium chloride 8 mEq (600 mg) oral capsule, extended release: 1 cap(s) orally every other day ***ON HOLD since Monday 1/20*** (24 Jan 2025 16:41)  PriLOSEC OTC 20 mg oral delayed release tablet: 1 tab(s) orally 2 times a day- OTC (24 Jan 2025 16:42)  sulfaSALAzine 500 mg oral tablet: 1 tab(s) orally every other day (24 Jan 2025 16:42)  terazosin 5 mg oral capsule: 1 cap(s) orally once a day (at bedtime) (24 Jan 2025 16:42)  Vitamin D3 50,000 intl units oral capsule: 1 cap(s) orally every 2 weeks (24 Jan 2025 16:42)      MEDICATIONS  (STANDING):  atorvastatin 80 milliGRAM(s) Oral at bedtime  lasix 40 mg ivp daily   Toprol xl 25 mg po daily   cefTRIAXone Injectable. 1000 milliGRAM(s) IV Push every 24 hours  dextrose 5%. 1000 milliLiter(s) (50 mL/Hr) IV Continuous <Continuous>  dextrose 5%. 1000 milliLiter(s) (100 mL/Hr) IV Continuous <Continuous>  dextrose 50% Injectable 25 Gram(s) IV Push once  dextrose 50% Injectable 12.5 Gram(s) IV Push once  dextrose 50% Injectable 25 Gram(s) IV Push once  doxazosin 4 milliGRAM(s) Oral at bedtime  fluticasone propionate/ salmeterol 100-50 MICROgram(s) Diskus 1 Dose(s) Inhalation two times a day  glucagon  Injectable 1 milliGRAM(s) IntraMuscular once  insulin glargine Injectable (LANTUS) 20 Unit(s) SubCutaneous at bedtime  insulin lispro (ADMELOG) corrective regimen sliding scale   SubCutaneous three times a day before meals  insulin lispro (ADMELOG) corrective regimen sliding scale   SubCutaneous at bedtime  sulfaSALAzine 500 milliGRAM(s) Oral daily    MEDICATIONS  (PRN):  acetaminophen     Tablet .. 650 milliGRAM(s) Oral every 6 hours PRN Mild Pain (1 - 3)  acetaminophen   IVPB .. 1000 milliGRAM(s) IV Intermittent once PRN Mild Pain (1 - 3)  dextrose Oral Gel 15 Gram(s) Oral once PRN Blood Glucose LESS THAN 70 milliGRAM(s)/deciliter  melatonin 3 milliGRAM(s) Oral at bedtime PRN Insomnia  ondansetron Injectable 4 milliGRAM(s) IV Push every 6 hours PRN Nausea and/or Vomiting      Allergies - No Known Allergies    REVIEW OF SYSTEMS:  CONSTITUTIONAL: No weakness, fevers or chills  Eyes: No visual changes  NECK: No pain or stiffness  RESPIRATORY: No cough, wheezing, hemoptysis; No shortness of breath  CARDIOVASCULAR: No chest pain or palpitations  GASTROINTESTINAL: No abdominal pain. No nausea, vomiting, or hematemesis; No diarrhea or constipation. No melena or hematochezia.  GENITOURINARY: + hematuria   NEUROLOGICAL: No numbness.  SKIN: No itching or rash  All other review of systems is negative unless indicated above    VITAL SIGNS:   Vital Signs Last 24 Hrs  T(C): 37.2 (24 Jan 2025 15:00), Max: 37.2 (24 Jan 2025 15:00)  T(F): 99 (24 Jan 2025 15:00), Max: 99 (24 Jan 2025 15:00)  HR: 87 (24 Jan 2025 15:00) (70 - 87)  BP: 139/62 (24 Jan 2025 15:00) (110/42 - 139/62)  BP(mean): 87 (23 Jan 2025 18:37) (87 - 87)  RR: 19 (24 Jan 2025 15:00) (17 - 20)  SpO2: 91% (24 Jan 2025 15:00) (91% - 96%)    Parameters below as of 24 Jan 2025 15:00  Patient On (Oxygen Delivery Method): room air        I&O's Summary    23 Jan 2025 07:01  -  24 Jan 2025 07:00  --------------------------------------------------------  IN: 13146 mL / OUT: 73071 mL / NET: -7800 mL    24 Jan 2025 07:01  -  24 Jan 2025 17:23  --------------------------------------------------------  IN: 6000 mL / OUT: 20403 mL / NET: -4650 mL        PHYSICAL EXAM:  Constitutional: NAD, awake and alert  HEENT:  EOMI,  Pupils round, No oral cyanosis.  Pulmonary: Non-labored, breath sounds are clear bilaterally, No wheezing, rales or rhonchi  Cardiovascular: S1 and S2, regular rate and rhythm, no Murmurs, gallops or rubs  Gastrointestinal: Bowel Sounds present, soft, nontender.   Lymph: +2 B/L LE edema  Neurological: Alert, no focal deficits  Skin: No rashes.  Psych:  Mood & affect appropriate    LABS: reviewed                         11.8   7.11  )-----------( 136      ( 24 Jan 2025 07:53 )             35.9                         13.6   6.46  )-----------( 145      ( 23 Jan 2025 14:45 )             40.9     24 Jan 2025 06:37    124    |  95     |  12     ----------------------------<  107    4.6     |  27     |  0.77   23 Jan 2025 14:45    125    |  89     |  12     ----------------------------<  126    4.6     |  31     |  0.86     Ca    9.4        24 Jan 2025 06:37  Ca    10.2       23 Jan 2025 14:45  Phos  3.2       24 Jan 2025 06:37  Mg     1.8       24 Jan 2025 06:37    TPro  6.0    /  Alb  2.6    /  TBili  0.7    /  DBili  x      /  AST  23     /  ALT  9      /  AlkPhos  78     23 Jan 2025 14:45    PT/INR - ( 23 Jan 2025 14:45 )   PT: 14.6 sec;   INR: 1.24 ratio         PTT - ( 23 Jan 2025 14:45 )  PTT:32.5 sec      Radiology/EKG/TTE: reviewed \    < from: Xray Chest 1 View- PORTABLE-Urgent (Xray Chest 1 View- PORTABLE-Urgent .) (12.24.24 @ 16:00) >  IMPRESSION:  1. Cardiomegaly with unipolar pacemaker lead in the right ventricle,   central pulmonary venous congestion and bibasilar pleural effusions, left   greater than right, where there is also suspected left lower lobe   atelectasis. These findings are increased from the prior exam.    < end of copied text >      < from: CT Abdomen and Pelvis No Cont (01.23.25 @ 16:23) >    IMPRESSION:  Bilateral pleural effusions.    Pericardial effusion. Cardiomegaly.    Bilateral renal cysts, some of which with rim calcification. Right renal   mass. Also bladder clot, cannot exclude mass. Recommend CT urogram if   feasible.    Diverticulosis.    Extensive atherosclerosis.    < end of copied text >      
SUBJECTIVE   "will they have to make an incision"   discussed current diagnosis   health literacy and understanding noted to be poor   without acute complaints at the current time   last eliquis dose unknown as per patient     INTERIM HISTORY SIGNIFICANT FOR       Patient is a 89y old  Male who presents with a chief complaint of Hematuria     (24 Jan 2025 10:16)    HPI:  Patient is an 89 y/o M with a PMH of A-fib on Eliquis, GERD, hypertension, hyperlipidemia, CVA, diabetes, rectal CA with chronic diarrhea from enemas, atonic bladder with self-catheterizations, status post permanent pacemaker for bradycardia and syncope and persistent known moderate sized pericardial effusion presenting to  ED on 1/23/25 for suprapubic pain.     He has a chronic Crump that was placed in [facility] on 12/24/24. Crump was functioning well until the morning of admission when the home health aide noted bright red blood in the bag. Patient also complaining of lower abdominal pain.  Patient is on daily Eliquis.  No fevers.  Normal p.o. intake.  No back pain.  Denies history of kidney stones.      Prior to having an indwelling Crump placed in December 2024 patient would self cath daily (6 times a day).       Upon arrival to the ED, vitals were /78 HR 82 RR 20 T97.1F and SpO2 of 96% on room air. Labs significant for Na 125, UA with large LE, positive nitrite, TNTC WBC TNTC RBC and moderate bacteria. CT abdomen and pelvis without contrast showed bilateral pleural effusions. Pericardial effusion. Cardiomegaly.Bilateral renal cysts, some of which with rim calcification. Right renal mass. Also bladder clot, cannot exclude mass. Recommend CT urogram if feasible. He received 1L NS bolus, IV Zofran 4mg x 1, IV Tylenol 1g x 1 and IV CFTX 1g x 1. Crump replaced in the ED and large amount of small clots removed. Subsequently, Crump draining clear fluid without issues.     Additional history obtained from home health aide present at bedside. She mentions he was admitted to [facility] in October 2024. There were bilateral pleural effusions. Patient was admitted to [facility] and started on IV diuretics. He then underwent pericardial window and thoracentesis. Post operative course complicated by abdominal pain and distention patient developed [place]'s syndrome. Patient was treated with the decompression colonoscopy.     Aide mentions he appears much more fluid overloaded than his baseline. Noticeable in his bilateral lower extremities, bilateral arms and scrotum. She also mentions he is bedbound but appears more fatigued and short of breath at times. Samanta says she was instructed by patient's daughter to hold off on his home Lasix dose today because his cardiologist said his Na level is low. He usually takes furosemide 20 mg BID. Last dose of furosemide was Wednesday (1/22/24) AM.     Currently, patient is reporting some suprapubic pain. He received IV Tylenol in the ED. Family is very cautious about using opiates because of his history of constipation. He requires a daily enema in the AM to have a bowel movement.  (24 Jan 2025 01:02)    OBJECTIVE  PAST MEDICAL & SURGICAL HISTORY:  Hypertension      Cancer of rectum      RBBB (right bundle branch block)      Asthma      TIA (transient ischemic attack)      UTI (urinary tract infection)      Enlarged prostate      Urinary retention with incomplete bladder emptying      Spinal stenosis      Chronic GERD      Rectal tumor      S/P TURP        No Known Allergies    Home Medications:  atorvastatin 80 mg oral tablet: 1 tab(s) orally once a day (at bedtime) (26 Jun 2021 12:32)  cefpodoxime 200 mg oral tablet: 1 tab(s) orally every 12 hours (26 Jun 2021 12:32)  CeleBREX 400 mg oral capsule: 1 cap(s) orally once a day (23 Jan 2025 22:53)  chlordiazepoxide-clidinium 5 mg-2.5 mg oral capsule: 1 cap(s) orally 2 times a day (26 Jun 2021 12:32)  Cranberry oral capsule: 400 milligram(s) orally once a day (26 Jun 2021 12:32)  Dulera 100 mcg-5 mcg/inh inhalation aerosol: 2 puff(s) inhaled 2 times a day (26 Jun 2021 12:32)  Eliquis 5 mg oral tablet: 1 tab(s) orally 2 times a day (26 Jun 2021 12:32)  Lantus Solostar Pen 100 units/mL subcutaneous solution: 20 unit(s) subcutaneous once a day (26 Jun 2021 12:32)  Lasix 20 mg oral tablet: 1 tab(s) orally 2 times a day (26 Jun 2021 12:32)  opium (equivalent to morphine 10 mg/mL) (Opium Tincture) oral tincture: 2 dose(s) orally once a day (26 Jun 2021 12:32)  potassium chloride 8 mEq (600 mg) oral capsule, extended release: 1 cap(s) orally every other day (26 Jun 2021 12:32)  PriLOSEC OTC 20 mg oral delayed release tablet: 1 tab(s) orally 2 times a day- OTC (26 Jun 2021 12:32)  sulfaSALAzine 500 mg oral tablet: 1 tab(s) orally once a day (26 Jun 2021 12:32)  terazosin 5 mg oral capsule: 1 cap(s) orally once a day (at bedtime) (26 Jun 2021 12:32)  Vitamin C 500 mg oral tablet: 1 tab(s) orally once a day (26 Jun 2021 12:32)  Vitamin D3 50,000 intl units oral capsule: 1 cap(s) orally every 2 weeks (26 Jun 2021 12:32)    VITALS  ICU Vital Signs Last 24 Hrs  T(C): 36.9 (24 Jan 2025 10:15), Max: 37 (23 Jan 2025 21:14)  T(F): 98.4 (24 Jan 2025 10:15), Max: 98.6 (23 Jan 2025 21:14)  HR: 73 (24 Jan 2025 10:15) (70 - 82)  BP: 110/42 (24 Jan 2025 10:15) (110/42 - 136/78)  BP(mean): 87 (23 Jan 2025 18:37) (87 - 87)  ABP: --  ABP(mean): --  RR: 20 (24 Jan 2025 10:15) (17 - 20)  SpO2: 95% (24 Jan 2025 10:15) (92% - 96%)    O2 Parameters below as of 24 Jan 2025 10:15  Patient On (Oxygen Delivery Method): room air          Adult Advanced Hemodynamics Last 24 Hrs  CVP(mm Hg): --  CVP(cm H2O): --  CO: --  CI: --  PA: --  PA(mean): --  PCWP: --  SVR: --  SVRI: --  PVR: --  PVRI: --  LABS                        11.8   7.11  )-----------( 136      ( 24 Jan 2025 07:53 )             35.9     Urinalysis with Rflx Culture (collected 23 Jan 2025 15:19)    PT/INR - ( 23 Jan 2025 14:45 )   PT: 14.6 sec;   INR: 1.24 ratio         PTT - ( 23 Jan 2025 14:45 )  PTT:32.5 [id number]    124[L]  |  95[L]  |  12  ----------------------------<  107[H]  4.6   |  27  |  0.77    Ca    9.4      24 Jan 2025 06:37  Phos  3.2     01-24  Mg     1.8     01-24    TPro  6.0  /  Alb  2.6[L]  /  TBili  0.7  /  DBili  x   /  AST  23  /  ALT  9[L]  /  AlkPhos  78  01-23  CAPILLARY BLOOD GLUCOSE      POCT Blood Glucose.: 125 mg/dL (24 Jan 2025 09:04)          IN/OUT    01-23-25 @ 07:01 - 01-24-25 @ 07:00  --------------------------------------------------------  IN: 24979 mL / OUT: 87764 mL / NET: -7800 mL    01-24-25 @ 07:01 - 01-24-25 @ 12:11  --------------------------------------------------------  IN: 6000 mL / OUT: 9950 mL / NET: -3950 mL      IMAGING  personally reviewed imaging     CURRENT MEDICATIONS  MEDICATIONS  (STANDING):  atorvastatin 80 milliGRAM(s) Oral at bedtime  cefTRIAXone Injectable. 1000 milliGRAM(s) IV Push every 24 hours  dextrose 5%. 1000 milliLiter(s) (50 mL/Hr) IV Continuous <Continuous>  dextrose 5%. 1000 milliLiter(s) (100 mL/Hr) IV Continuous <Continuous>  dextrose 50% Injectable 25 Gram(s) IV Push once  dextrose 50% Injectable 12.5 Gram(s) IV Push once  dextrose 50% Injectable 25 Gram(s) IV Push once  doxazosin 4 milliGRAM(s) Oral at bedtime  fluticasone propionate/ salmeterol 100-50 MICROgram(s) Diskus 1 Dose(s) Inhalation two times a day  furosemide   Injectable 40 milliGRAM(s) IV Push daily  glucagon  Injectable 1 milliGRAM(s) IntraMuscular once  insulin glargine Injectable (LANTUS) 20 Unit(s) SubCutaneous at bedtime  insulin lispro (ADMELOG) corrective regimen sliding scale   SubCutaneous three times a day before meals  insulin lispro (ADMELOG) corrective regimen sliding scale   SubCutaneous at bedtime  sulfaSALAzine 500 milliGRAM(s) Oral daily    MEDICATIONS  (PRN):  acetaminophen     Tablet .. 650 milliGRAM(s) Oral every 6 hours PRN Mild Pain (1 - 3)  dextrose Oral Gel 15 Gram(s) Oral once PRN Blood Glucose LESS THAN 70 milliGRAM(s)/deciliter  melatonin 3 milliGRAM(s) Oral at bedtime PRN Insomnia  ondansetron Injectable 4 milliGRAM(s) IV Push every 6 hours PRN Nausea and/or Vomiting

## 2025-01-25 NOTE — PROGRESS NOTE ADULT - ASSESSMENT
Daughter and son-in-law at the bedside.    Per daughter patient follows with urologist at  [facility].  Before December was undergoing clean intermittent catheterizations by nursing aide.  Due to the need of diuretic was recommended indwelling Crump catheter.  Patient is a past smoker.    Discussed gross hematuria possibly secondary to catheter trauma.  Discussed  management option of continued indwelling Crump catheter versus reverting back to clean intermittent catheterization versus suprapubic catheter.    Discussed risk and benefits of each.    Family will consider options, discussed with patient and make a decision.

## 2025-01-25 NOTE — CONSULT NOTE ADULT - ASSESSMENT
89 y/o Male with h/o A-fib on Eliquis, GERD, hypertension, hyperlipidemia, old CVA, diabetes, rectal CA with chronic diarrhea, atonic bladder s/p self-catheterizations, PM for bradycardia and syncope and persistent moderate pericardial effusion, urinary retention with Robertson cath was on 1/23/25 for suprapubic pain. He has a chronic Robertson that was placed in [facility] on 12/24/24. Robertson was functioning well until the morning of admission when the home health aide noted bright red blood in the bag. Patient also complaining of lower abdominal pain. No fever or back pain. In ER the robertson was changed and large amount of small clots removed. In ER he received ceftriaxone. He was reported with new bacteriuria.     #Bacteriuria with PSAE and ENFA. Likely true pathogens since patient is symptomatic. Likely UTI   #Atonic bladder with urinary retention and Robertson catheter  #Bilateral pleural effusions.  #Pericardial effusion. Cardiomegaly.  #Bilateral renal cysts and right renal mass.  #Rectal Ca.   -cultures reviewed  -concern for infection with multiresistant organisms is raised. Prior cultures reviewed. An epidemiologic assessment was performed. The risk of the microorganism spread to family members, healthcare staff is low. Standard isolation in place at present time. Will reconsider isolation measures according to infection control policy, further culture results and other new information. The patient will be monitored closely, cultures collected as appropriate  -start cefepime 2 gm IV q12h and cipro  500 mg PO q12h  -reason for abx use and side effects reviewed with patient; monitor BMP  -f/u cultures  -old chart reviewed to assess prior cultures  -monitor temps  -f/u CBC  -supportive care  2. Other issues:   -care per medicine    d/w medicine team 89 y/o Male with h/o A-fib on Eliquis, GERD, hypertension, hyperlipidemia, old CVA, diabetes, rectal CA with chronic diarrhea, atonic bladder s/p self-catheterizations, PM for bradycardia and syncope and persistent moderate pericardial effusion, urinary retention with Robertson cath was on 1/23/25 for suprapubic pain. He has a chronic Robertson that was placed in [facility] on 12/24/24. Robertson was functioning well until the morning of admission when the home health aide noted bright red blood in the bag. Patient also complaining of lower abdominal pain. No fever or back pain. In ER the robertson was changed and large amount of small clots removed. In ER he received ceftriaxone. He was reported with new bacteriuria.     #Bacteriuria with PSAE and ENFA. Likely true pathogens since patient is symptomatic. Likely UTI   #Atonic bladder with urinary retention and Robertson catheter  #Bilateral pleural effusions.  #Pericardial effusion. Cardiomegaly.  #Bilateral renal cysts and right renal mass.  #Rectal Ca.   -cultures reviewed  -concern for infection with multiresistant organisms is raised. Prior cultures reviewed. An epidemiologic assessment was performed. The risk of the microorganism spread to family members, healthcare staff is low. Standard isolation in place at present time. Will reconsider isolation measures according to infection control policy, further culture results and other new information. The patient will be monitored closely, cultures collected as appropriate  -start cefepime 2 gm IV q12h and cipro  500 mg PO q12h  -reason for abx use and side effects reviewed with patient; monitor BMP  -f/u cultures  -old chart reviewed to assess prior cultures  -monitor temps  -f/u CBC  -supportive care  2. Other issues:   -care per medicine    d/w medicine team  d/w children at bedside

## 2025-01-26 DIAGNOSIS — R31.0 GROSS HEMATURIA: ICD-10-CM

## 2025-01-26 DIAGNOSIS — N40.1 BENIGN PROSTATIC HYPERPLASIA WITH LOWER URINARY TRACT SYMPTOMS: ICD-10-CM

## 2025-01-26 DIAGNOSIS — Z97.8 PRESENCE OF OTHER SPECIFIED DEVICES: ICD-10-CM

## 2025-01-26 LAB
-  AMIKACIN: SIGNIFICANT CHANGE UP
-  AMPICILLIN: SIGNIFICANT CHANGE UP
-  AZTREONAM: SIGNIFICANT CHANGE UP
-  CEFEPIME: SIGNIFICANT CHANGE UP
-  CEFTAZIDIME/AVIBACTAM: SIGNIFICANT CHANGE UP
-  CEFTAZIDIME: SIGNIFICANT CHANGE UP
-  CEFTOLOZANE/TAZOBACTAM: SIGNIFICANT CHANGE UP
-  CIPROFLOXACIN: SIGNIFICANT CHANGE UP
-  CIPROFLOXACIN: SIGNIFICANT CHANGE UP
-  IMIPENEM: SIGNIFICANT CHANGE UP
-  LEVOFLOXACIN: SIGNIFICANT CHANGE UP
-  LEVOFLOXACIN: SIGNIFICANT CHANGE UP
-  MEROPENEM: SIGNIFICANT CHANGE UP
-  NITROFURANTOIN: SIGNIFICANT CHANGE UP
-  PIPERACILLIN/TAZOBACTAM: SIGNIFICANT CHANGE UP
-  TETRACYCLINE: SIGNIFICANT CHANGE UP
-  VANCOMYCIN: SIGNIFICANT CHANGE UP
ALBUMIN SERPL ELPH-MCNC: 2.1 G/DL — LOW (ref 3.3–5)
ALP SERPL-CCNC: 77 U/L — SIGNIFICANT CHANGE UP (ref 40–120)
ALT FLD-CCNC: 7 U/L — LOW (ref 12–78)
ANION GAP SERPL CALC-SCNC: 3 MMOL/L — LOW (ref 5–17)
AST SERPL-CCNC: 15 U/L — SIGNIFICANT CHANGE UP (ref 15–37)
BILIRUB SERPL-MCNC: 0.5 MG/DL — SIGNIFICANT CHANGE UP (ref 0.2–1.2)
BLANDM BLD POS QL PROBE: SIGNIFICANT CHANGE UP
BUN SERPL-MCNC: 15 MG/DL — SIGNIFICANT CHANGE UP (ref 7–23)
CALCIUM SERPL-MCNC: 9.4 MG/DL — SIGNIFICANT CHANGE UP (ref 8.5–10.1)
CHLORIDE SERPL-SCNC: 93 MMOL/L — LOW (ref 96–108)
CO2 SERPL-SCNC: 33 MMOL/L — HIGH (ref 22–31)
CREAT SERPL-MCNC: 0.84 MG/DL — SIGNIFICANT CHANGE UP (ref 0.5–1.3)
CULTURE RESULTS: ABNORMAL
EGFR: 83 ML/MIN/1.73M2 — SIGNIFICANT CHANGE UP
GLUCOSE BLDC GLUCOMTR-MCNC: 111 MG/DL — HIGH (ref 70–99)
GLUCOSE BLDC GLUCOMTR-MCNC: 115 MG/DL — HIGH (ref 70–99)
GLUCOSE BLDC GLUCOMTR-MCNC: 129 MG/DL — HIGH (ref 70–99)
GLUCOSE BLDC GLUCOMTR-MCNC: 133 MG/DL — HIGH (ref 70–99)
GLUCOSE SERPL-MCNC: 107 MG/DL — HIGH (ref 70–99)
HCT VFR BLD CALC: 35.7 % — LOW (ref 39–50)
HGB BLD-MCNC: 11.8 G/DL — LOW (ref 13–17)
MCHC RBC-ENTMCNC: 30 PG — SIGNIFICANT CHANGE UP (ref 27–34)
MCHC RBC-ENTMCNC: 33.1 G/DL — SIGNIFICANT CHANGE UP (ref 32–36)
MCV RBC AUTO: 90.8 FL — SIGNIFICANT CHANGE UP (ref 80–100)
METHOD TYPE: SIGNIFICANT CHANGE UP
ORGANISM # SPEC MICROSCOPIC CNT: ABNORMAL
ORGANISM # SPEC MICROSCOPIC CNT: SIGNIFICANT CHANGE UP
PLATELET # BLD AUTO: 131 K/UL — LOW (ref 150–400)
POTASSIUM SERPL-MCNC: 4 MMOL/L — SIGNIFICANT CHANGE UP (ref 3.5–5.3)
POTASSIUM SERPL-SCNC: 4 MMOL/L — SIGNIFICANT CHANGE UP (ref 3.5–5.3)
PROT SERPL-MCNC: 5.5 GM/DL — LOW (ref 6–8.3)
RBC # BLD: 3.93 M/UL — LOW (ref 4.2–5.8)
RBC # FLD: 15.1 % — HIGH (ref 10.3–14.5)
SODIUM SERPL-SCNC: 129 MMOL/L — LOW (ref 135–145)
SPECIMEN SOURCE: SIGNIFICANT CHANGE UP
WBC # BLD: 6.18 K/UL — SIGNIFICANT CHANGE UP (ref 3.8–10.5)
WBC # FLD AUTO: 6.18 K/UL — SIGNIFICANT CHANGE UP (ref 3.8–10.5)

## 2025-01-26 PROCEDURE — 99232 SBSQ HOSP IP/OBS MODERATE 35: CPT

## 2025-01-26 PROCEDURE — 99233 SBSQ HOSP IP/OBS HIGH 50: CPT | Mod: FS

## 2025-01-26 RX ADMIN — ALBUMIN HUMAN 50 MILLILITER(S): 50 SOLUTION INTRAVENOUS at 17:53

## 2025-01-26 RX ADMIN — Medication 500 MILLIGRAM(S): at 21:31

## 2025-01-26 RX ADMIN — Medication 133 MILLILITER(S): at 06:07

## 2025-01-26 RX ADMIN — ALBUMIN HUMAN 50 MILLILITER(S): 50 SOLUTION INTRAVENOUS at 06:04

## 2025-01-26 RX ADMIN — Medication 1 GRAM(S): at 09:38

## 2025-01-26 RX ADMIN — INSULIN GLARGINE-YFGN 20 UNIT(S): 100 INJECTION, SOLUTION SUBCUTANEOUS at 21:31

## 2025-01-26 RX ADMIN — ACETAMINOPHEN 650 MILLIGRAM(S): 160 SUSPENSION ORAL at 21:32

## 2025-01-26 RX ADMIN — Medication 500 MILLIGRAM(S): at 09:38

## 2025-01-26 RX ADMIN — Medication 40 MILLIGRAM(S): at 06:04

## 2025-01-26 RX ADMIN — Medication 25 MILLIGRAM(S): at 09:38

## 2025-01-26 RX ADMIN — FLUTICASONE PROPIONATE AND SALMETEROL 1 DOSE(S): 113; 14 POWDER, METERED RESPIRATORY (INHALATION) at 20:21

## 2025-01-26 RX ADMIN — FLUTICASONE PROPIONATE AND SALMETEROL 1 DOSE(S): 113; 14 POWDER, METERED RESPIRATORY (INHALATION) at 11:12

## 2025-01-26 RX ADMIN — ACETAMINOPHEN 650 MILLIGRAM(S): 160 SUSPENSION ORAL at 14:32

## 2025-01-26 RX ADMIN — Medication 2000 MILLIGRAM(S): at 09:37

## 2025-01-26 RX ADMIN — Medication 500 MILLIGRAM(S): at 09:37

## 2025-01-26 RX ADMIN — Medication 1 GRAM(S): at 21:31

## 2025-01-26 RX ADMIN — ATORVASTATIN CALCIUM 80 MILLIGRAM(S): 80 TABLET, FILM COATED ORAL at 21:32

## 2025-01-26 RX ADMIN — Medication 4 MILLIGRAM(S): at 21:32

## 2025-01-26 RX ADMIN — Medication 40 MILLIGRAM(S): at 17:54

## 2025-01-26 NOTE — PROGRESS NOTE ADULT - ASSESSMENT
89 y/o M with a PMH of A-fib on Eliquis, GERD, hypertension, hyperlipidemia, CVA, diabetes, rectal CA with chronic diarrhea from enemas, atonic bladder with self-catheterizations, status post permanent pacemaker for bradycardia and syncope and persistent known moderate sized pericardial effusion presenting to  ED on 1/23/25 for suprapubic pain. He has a chronic Robertson that was placed in [facility] on 12/24/24. Robertson was functioning well until the morning of admission when the home health aide noted bright red blood in the bag. Patient also complaining of lower abdominal pain.  Patient is on daily Eliquis.  No fevers.  Normal p.o. intake.  No back pain.  Denies history of kidney stones. Prior to having an indwelling Robertson placed in December 2024 patient would self cath daily (6 times a day).    #Hematuria   #UTI   - Robertson changed in the ED - now without blood appreciated   - Hgb stable    - Abnormal UA  - SP rocephin  - neg blood cultures   - Urine growing pseud and E Faecalis  - Continue cipro  - ID consult appreciated   - F/u urology consult  - CT urogram negative for bladder mass  - Will hold eliquis pending procedure if any    #Moderate to large bilateral pleural effusions  # Likely  Acute on chronic HFpEF  # Pericardial effusion   - As per CT abdomen and pelvis   - Thoracic on board, monitor on lasix    - Hold eliquis for now   - BNP elevated  - Echo reviewed as above  - Lasix IV BID  - Albumin BID  - Moderate pericardial effusion SP pericardial window 10/24  - Cardiology consult appreciated    #Hyponatremia   - hypervolemic hyponatremia   - He received 1L NS bolus in the ED  -  today  - Continue lasix   - F/U urine/serum studies   - Nephrology consult appreciated  - Add NACL  - Continue albumin    # LUE swelling  - Doppler negative  - Likely infiltrate     #Afib   - Holding off on Eliquis for possible thoracentesis   - Continue metoprolol succinate 25 mg QD    # History of UC  - Continue sulfasalazine    # BPH  - Chronic robertson  - Will continue doxasozin till urology sees    #T2DM  - Continue lantus  - Continue insulin sliding scale    #DVT ppx   - Takes Eliquis 2.5 mg - will hold off for now  - SCDs   89 y/o M with a PMH of A-fib on Eliquis, GERD, hypertension, hyperlipidemia, CVA, diabetes, rectal CA with chronic diarrhea from enemas, atonic bladder with self-catheterizations, status post permanent pacemaker for bradycardia and syncope and persistent known moderate sized pericardial effusion presenting to  ED on 1/23/25 for suprapubic pain. He has a chronic Robertson that was placed in [facility] on 12/24/24. Robertson was functioning well until the morning of admission when the home health aide noted bright red blood in the bag. Patient also complaining of lower abdominal pain.  Patient is on daily Eliquis.  No fevers.  Normal p.o. intake.  No back pain.  Denies history of kidney stones. Prior to having an indwelling Robertson placed in December 2024 patient would self cath daily (6 times a day).    #Hematuria   # MDRO UTI   - Robertson changed in the ED - now without blood appreciated   - Hgb stable    - Abnormal UA  - SP rocephin  - neg blood cultures   - Urine growing MDRO pseud and E Faecalis  - Continue cipro  - ID consult appreciated   - F/u urology consult  - CT urogram negative for bladder mass  - Will hold eliquis pending procedure if any    #Moderate to large bilateral pleural effusions  # Likely  Acute on chronic HFpEF  # Pericardial effusion   - As per CT abdomen and pelvis   - Thoracic on board, monitor on lasix    - Hold eliquis for now   - BNP elevated  - Echo reviewed as above  - Lasix IV BID  - Albumin BID  - Moderate pericardial effusion SP pericardial window 10/24  - Cardiology consult appreciated    #Hyponatremia   - hypervolemic hyponatremia   - He received 1L NS bolus in the ED  -  today  - Continue lasix   - F/U urine/serum studies   - Nephrology consult appreciated  - Add NACL  - Continue albumin    # LUE swelling  - Doppler negative  - Likely infiltrate     #Afib   - Holding off on Eliquis for possible thoracentesis   - Continue metoprolol succinate 25 mg QD    # History of UC  - Continue sulfasalazine    # BPH  - Chronic robertson  - Will continue doxasozin till urology sees    #T2DM  - Continue lantus  - Continue insulin sliding scale    #DVT ppx   - Takes Eliquis 2.5 mg - will hold off for now  - SCDs

## 2025-01-26 NOTE — PROGRESS NOTE ADULT - SUBJECTIVE AND OBJECTIVE BOX
HOSPITALIST ATTENDING PROGRESS NOTE    Chart and meds reviewed.  Patient seen and examined.    CC: UTI    Subjective: No acute issues overnight. No fever.     All other systems reviewed and found to be negative with the exception of what has been described above.    MEDICATIONS  (STANDING):  albumin human 25% IVPB 50 milliLiter(s) IV Intermittent every 12 hours  atorvastatin 80 milliGRAM(s) Oral at bedtime  ciprofloxacin     Tablet 500 milliGRAM(s) Oral every 12 hours  dextrose 5%. 1000 milliLiter(s) (50 mL/Hr) IV Continuous <Continuous>  dextrose 5%. 1000 milliLiter(s) (100 mL/Hr) IV Continuous <Continuous>  dextrose 50% Injectable 25 Gram(s) IV Push once  dextrose 50% Injectable 12.5 Gram(s) IV Push once  dextrose 50% Injectable 25 Gram(s) IV Push once  doxazosin 4 milliGRAM(s) Oral at bedtime  fluticasone propionate/ salmeterol 100-50 MICROgram(s) Diskus 1 Dose(s) Inhalation two times a day  furosemide   Injectable 40 milliGRAM(s) IV Push every 12 hours  glucagon  Injectable 1 milliGRAM(s) IntraMuscular once  insulin glargine Injectable (LANTUS) 20 Unit(s) SubCutaneous at bedtime  insulin lispro (ADMELOG) corrective regimen sliding scale   SubCutaneous three times a day before meals  insulin lispro (ADMELOG) corrective regimen sliding scale   SubCutaneous at bedtime  metoprolol succinate ER 25 milliGRAM(s) Oral daily  mineral oil enema 133 milliLiter(s) Rectal daily  sodium chloride 1 Gram(s) Oral two times a day  sulfaSALAzine 500 milliGRAM(s) Oral daily    MEDICATIONS  (PRN):  acetaminophen     Tablet .. 650 milliGRAM(s) Oral every 6 hours PRN Mild Pain (1 - 3)  acetaminophen   IVPB .. 1000 milliGRAM(s) IV Intermittent once PRN Mild Pain (1 - 3)  dextrose Oral Gel 15 Gram(s) Oral once PRN Blood Glucose LESS THAN 70 milliGRAM(s)/deciliter  melatonin 3 milliGRAM(s) Oral at bedtime PRN Insomnia  ondansetron Injectable 4 milliGRAM(s) IV Push every 6 hours PRN Nausea and/or Vomiting    Vital Signs Last 24 Hrs  T(C): 36.7 (26 Jan 2025 08:49), Max: 37 (25 Jan 2025 21:55)  T(F): 98 (26 Jan 2025 08:49), Max: 98.6 (25 Jan 2025 21:55)  HR: 87 (26 Jan 2025 08:49) (74 - 88)  BP: 141/67 (26 Jan 2025 08:49) (128/70 - 145/61)  RR: 18 (26 Jan 2025 08:49) (17 - 18)  SpO2: 98% (26 Jan 2025 08:49) (93% - 98%)    Parameters below as of 26 Jan 2025 08:49  Patient On (Oxygen Delivery Method): nasal cannula    GEN: NAD  HEENT:  pupils equal and reactive, EOMI, no oropharyngeal lesions, erythema, exudates, oral thrush  NECK:   supple, no carotid bruits  CV:  +S1, +S2, regular, no murmurs  RESP:   lungs clear to auscultation bilaterally, no wheezing, rales, rhonchi, good air entry bilaterally  GI:  abdomen soft, non-tender, non-distended, normal BS  EXT:  no clubbing, no cyanosis, + 2 edema, no calf pain, swelling or erythema  NEURO:  AAOX3, no focal neurological deficits, follows all commands, able to move extremities spontaneously  SKIN:  no ulcers, lesions or rashes    LABS:                          11.8   6.18  )-----------( 131      ( 26 Jan 2025 06:40 )             35.7     01-26    129[L]  |  93[L]  |  15  ----------------------------<  107[H]  4.0   |  33[H]  |  0.84    Ca    9.4      26 Jan 2025 06:40    TPro  5.5[L]  /  Alb  2.1[L]  /  TBili  0.5  /  DBili  x   /  AST  15  /  ALT  7[L]  /  AlkPhos  77  01-26    LIVER FUNCTIONS - ( 26 Jan 2025 06:40 )  Alb: 2.1 g/dL / Pro: 5.5 gm/dL / ALK PHOS: 77 U/L / ALT: 7 U/L / AST: 15 U/L / GGT: x           Urinalysis Basic - ( 26 Jan 2025 06:40 )  Color: x / Appearance: x / SG: x / pH: x  Gluc: 107 mg/dL / Ketone: x  / Bili: x / Urobili: x   Blood: x / Protein: x / Nitrite: x   Leuk Esterase: x / RBC: x / WBC x   Sq Epi: x / Non Sq Epi: x / Bacteria: x    : US Duplex Venous Upper Ext Ltd, Left (01.25.25 @ 10:46) >  IMPRESSION:  No evidence of left upper extremity deep venous thrombosis.

## 2025-01-26 NOTE — PROGRESS NOTE ADULT - SUBJECTIVE AND OBJECTIVE BOX
CHIEF COMPLAINT: Patient is a 89y old  Male who presents with a chief complaint of hematuria   UTI   pleural effusions (24 Jan 2025 15:50)      HPI:  Patient is an 89 y/o M with a PMH of A-fib on Eliquis, GERD, hypertension, hyperlipidemia, CVA, diabetes, rectal CA with chronic diarrhea from enemas, atonic bladder with self-catheterizations, status post permanent pacemaker for bradycardia and syncope and persistent known moderate sized pericardial effusion presenting to  ED on 1/23/25 for suprapubic pain.     He has a chronic Crump that was placed in [facility] on 12/24/24. Crump was functioning well until the morning of admission when the home health aide noted bright red blood in the bag. Patient also complaining of lower abdominal pain.  Patient is on daily Eliquis.  No fevers.  Normal p.o. intake.  No back pain.  Denies history of kidney stones.      Prior to having an indwelling Crump placed in December 2024 patient would self cath daily (6 times a day).       Upon arrival to the ED, vitals were /78 HR 82 RR 20 T97.1F and SpO2 of 96% on room air. Labs significant for Na 125, UA with large LE, positive nitrite, TNTC WBC TNTC RBC and moderate bacteria. CT abdomen and pelvis without contrast showed bilateral pleural effusions. Pericardial effusion. Cardiomegaly.Bilateral renal cysts, some of which with rim calcification. Right renal mass. Also bladder clot, cannot exclude mass. Recommend CT urogram if feasible. He received 1L NS bolus, IV Zofran 4mg x 1, IV Tylenol 1g x 1 and IV CFTX 1g x 1. Crump replaced in the ED and large amount of small clots removed. Subsequently, Crump draining clear fluid without issues.     Additional history obtained from home health aide present at bedside. She mentions he was admitted to [facility] in October 2024. There were bilateral pleural effusions. Patient was admitted to [facility] and started on IV diuretics. He then underwent pericardial window and thoracentesis. Post operative course complicated by abdominal pain and distention patient developed Mcallen's syndrome. Patient was treated with the decompression colonoscopy.     Aide mentions he appears much more fluid overloaded than his baseline. Noticeable in his bilateral lower extremities, bilateral arms and scrotum. She also mentions he is bedbound but appears more fatigued and short of breath at times. Aide says she was instructed by patient's daughter to hold off on his home Lasix dose today because his cardiologist said his Na level is low. He usually takes furosemide 20 mg BID. Last dose of furosemide was Wednesday (1/22/24) AM.     Currently, patient is reporting some suprapubic pain. He received IV Tylenol in the ED. Family is very cautious about using opiates because of his history of constipation. He requires a daily enema in the AM to have a bowel movement.  (24 Jan 2025 01:02)    Cardiology consulted for HF (unknown EF), CT with pericardial effusion and B/L pleural effusion. Pt. with mild SOB, +2 B/L LE edema, Family at bedside - says his edema is much better at present, Echo performed,     1/25/25: Pt awake with aide at bedside. Denies cp or sob.    1/26/25:    PAST MEDICAL & SURGICAL HISTORY:  Pericardial effusion s/p pericardiocentesis 10/24 - [facility]   Pleural effusions s/p thoracentesis 10/24 - [facility]   Bradycardia s/p PPM   Hypertension  Cancer of rectum  RBBB (right bundle branch block)  Asthma  TIA (transient ischemic attack)  UTI (urinary tract infection)  Enlarged prostate  Urinary retention with incomplete bladder emptying  Spinal stenosis  Chronic GERD  Rectal tumor  S/P TURP      SOCIAL HISTORY:   Alcohol: Denied  Smoking: Nonsmoker  Drug Use: Denied  Marital Status:     FAMILY HISTORY: non contributory     Home Medications:  albuterol 2.5 mg/3 mL (0.083%) inhalation solution: 3 milliliter(s) by nebulizer 1 to 2 times a day (24 Jan 2025 16:45)  atorvastatin 80 mg oral tablet: 1 tab(s) orally once a day (at bedtime) (24 Jan 2025 16:42)  cefpodoxime 200 mg oral tablet: 1 tab(s) orally every 12 hours ***ON HOLD while at *** (24 Jan 2025 16:39)  CeleBREX 400 mg oral capsule: 1 cap(s) orally once a day (23 Jan 2025 22:53)  chlordiazepoxide-clidinium 5 mg-2.5 mg oral capsule: 1 cap(s) orally 2 times a day (24 Jan 2025 16:42)  clonazePAM 2 mg oral tablet: 1 tab(s) orally once a day as needed for  anxiety (24 Jan 2025 16:45)  Dulera 100 mcg-5 mcg/inh inhalation aerosol: 2 puff(s) inhaled 2 times a day (24 Jan 2025 16:42)  Eliquis 2.5 mg oral tablet: 1 tab(s) orally 2 times a day ***ON HOLD while at *** (24 Jan 2025 16:45)  Lantus Solostar Pen 100 units/mL subcutaneous solution: 20 unit(s) subcutaneous once a day as needed for BG &gt; 150 , for BG &lt; 150 HOLD dose (24 Jan 2025 16:40)  Lasix 20 mg oral tablet: 1 tab(s) orally 2 times a day ***ON HOLD since Monday 1/20*** (24 Jan 2025 16:41)  Linzess 145 mcg oral capsule: 1 cap(s) orally once a day (24 Jan 2025 16:45)  metoprolol succinate 25 mg oral tablet, extended release: 1 tab(s) orally once a day (24 Jan 2025 16:45)    MEDICATIONS  (STANDING):  albumin human 25% IVPB 50 milliLiter(s) IV Intermittent every 12 hours  atorvastatin 80 milliGRAM(s) Oral at bedtime  ciprofloxacin     Tablet 500 milliGRAM(s) Oral every 12 hours  dextrose 5%. 1000 milliLiter(s) (50 mL/Hr) IV Continuous <Continuous>  dextrose 5%. 1000 milliLiter(s) (100 mL/Hr) IV Continuous <Continuous>  dextrose 50% Injectable 25 Gram(s) IV Push once  dextrose 50% Injectable 12.5 Gram(s) IV Push once  dextrose 50% Injectable 25 Gram(s) IV Push once  doxazosin 4 milliGRAM(s) Oral at bedtime  fluticasone propionate/ salmeterol 100-50 MICROgram(s) Diskus 1 Dose(s) Inhalation two times a day  furosemide   Injectable 40 milliGRAM(s) IV Push every 12 hours  glucagon  Injectable 1 milliGRAM(s) IntraMuscular once  insulin glargine Injectable (LANTUS) 20 Unit(s) SubCutaneous at bedtime  insulin lispro (ADMELOG) corrective regimen sliding scale   SubCutaneous three times a day before meals  insulin lispro (ADMELOG) corrective regimen sliding scale   SubCutaneous at bedtime  metoprolol succinate ER 25 milliGRAM(s) Oral daily  mineral oil enema 133 milliLiter(s) Rectal daily  sodium chloride 1 Gram(s) Oral two times a day  sulfaSALAzine 500 milliGRAM(s) Oral daily    MEDICATIONS  (PRN):  acetaminophen     Tablet .. 650 milliGRAM(s) Oral every 6 hours PRN Mild Pain (1 - 3)  acetaminophen   IVPB .. 1000 milliGRAM(s) IV Intermittent once PRN Mild Pain (1 - 3)  dextrose Oral Gel 15 Gram(s) Oral once PRN Blood Glucose LESS THAN 70 milliGRAM(s)/deciliter  melatonin 3 milliGRAM(s) Oral at bedtime PRN Insomnia  ondansetron Injectable 4 milliGRAM(s) IV Push every 6 hours PRN Nausea and/or Vomiting      Allergies - No Known Allergies    Vital Signs Last 24 Hrs  T(C): 36.7 (26 Jan 2025 08:49), Max: 37 (25 Jan 2025 21:55)  T(F): 98 (26 Jan 2025 08:49), Max: 98.6 (25 Jan 2025 21:55)  HR: 87 (26 Jan 2025 08:49) (74 - 88)  BP: 141/67 (26 Jan 2025 08:49) (128/70 - 145/61)  BP(mean): --  RR: 18 (26 Jan 2025 08:49) (17 - 18)  SpO2: 98% (26 Jan 2025 08:49) (93% - 98%)    Parameters below as of 26 Jan 2025 08:49  Patient On (Oxygen Delivery Method): nasal cannula      I&O's Summary    23 Jan 2025 07:01  -  24 Jan 2025 07:00  --------------------------------------------------------  IN: 37540 mL / OUT: 35452 mL / NET: -7800 mL    24 Jan 2025 07:01  -  24 Jan 2025 17:23  --------------------------------------------------------  IN: 6000 mL / OUT: 85648 mL / NET: -4650 mL        PHYSICAL EXAM:  Constitutional: NAD, awake and alert  HEENT:  EOMI,  Pupils round, No oral cyanosis.  Pulmonary: Non-labored, rales left base, No wheezing, rales or rhonchi  Cardiovascular: S1 and S2, regular rate and rhythm, no Murmurs, gallops or rubs  Gastrointestinal: Bowel Sounds present, soft, nontender.   Lymph: 1-+2 B/L LE edema  Neurological: Alert, no focal deficits  Skin: No rashes.  Psych:  Mood & affect appropriate    LABS: reviewed                           11.8   6.18  )-----------( 131      ( 26 Jan 2025 06:40 )             35.7                           12.5   7.21  )-----------( 106      ( 25 Jan 2025 07:47 )             39.0                           11.8   7.11  )-----------( 136      ( 24 Jan 2025 07:53 )             35.9                         13.6   6.46  )-----------( 145      ( 23 Jan 2025 14:45 )             40.9     01-26    129[L]  |  93[L]  |  15  ----------------------------<  107[H]  4.0   |  33[H]  |  0.84    Ca    9.4      26 Jan 2025 06:40    TPro  5.5[L]  /  Alb  2.1[L]  /  TBili  0.5  /  DBili  x   /  AST  15  /  ALT  7[L]  /  AlkPhos  77  01-26 01-25    124[L]  |  94[L]  |  16  ----------------------------<  103[H]  4.8   |  27  |  0.86    Ca    9.6      25 Jan 2025 07:47  Phos  3.2     01-24  Mg     1.8     01-24    TPro  5.6[L]  /  Alb  2.1[L]  /  TBili  0.4  /  DBili  x   /  AST  17  /  ALT  8[L]  /  AlkPhos  71  01-25 24 Jan 2025 06:37    124    |  95     |  12     ----------------------------<  107    4.6     |  27     |  0.77   23 Jan 2025 14:45    125    |  89     |  12     ----------------------------<  126    4.6     |  31     |  0.86     Ca    9.4        24 Jan 2025 06:37  Ca    10.2       23 Jan 2025 14:45  Phos  3.2       24 Jan 2025 06:37  Mg     1.8       24 Jan 2025 06:37    TPro  6.0    /  Alb  2.6    /  TBili  0.7    /  DBili  x      /  AST  23     /  ALT  9      /  AlkPhos  78     23 Jan 2025 14:45    PT/INR - ( 23 Jan 2025 14:45 )   PT: 14.6 sec;   INR: 1.24 ratio         PTT - ( 23 Jan 2025 14:45 )  PTT:32.5 sec      Radiology/EKG/TTE: reviewed \    < from: Xray Chest 1 View- PORTABLE-Urgent (Xray Chest 1 View- PORTABLE-Urgent .) (12.24.24 @ 16:00) >  IMPRESSION:  1. Cardiomegaly with unipolar pacemaker lead in the right ventricle,   central pulmonary venous congestion and bibasilar pleural effusions, left   greater than right, where there is also suspected left lower lobe   atelectasis. These findings are increased from the prior exam.    < end of copied text >      < from: CT Abdomen and Pelvis No Cont (01.23.25 @ 16:23) >    IMPRESSION:  Bilateral pleural effusions.    Pericardial effusion. Cardiomegaly.    Bilateral renal cysts, some of which with rim calcification. Right renal   mass. Also bladder clot, cannot exclude mass. Recommend CT urogram if   feasible.    Diverticulosis.    Extensive atherosclerosis.    < end of copied text >      < from: US Duplex Venous Upper Ext Ltd, Left (01.25.25 @ 10:46) >    IMPRESSION:  No evidence of left upper extremity deep venous thrombosis.    < end of copied text >       CHIEF COMPLAINT: Patient is a 89y old  Male who presents with a chief complaint of hematuria   UTI   pleural effusions (24 Jan 2025 15:50)      HPI:  Patient is an 89 y/o M with a PMH of A-fib on Eliquis, GERD, hypertension, hyperlipidemia, CVA, diabetes, rectal CA with chronic diarrhea from enemas, atonic bladder with self-catheterizations, status post permanent pacemaker for bradycardia and syncope and persistent known moderate sized pericardial effusion presenting to  ED on 1/23/25 for suprapubic pain.     He has a chronic Crump that was placed in [facility] on 12/24/24. Crump was functioning well until the morning of admission when the home health aide noted bright red blood in the bag. Patient also complaining of lower abdominal pain.  Patient is on daily Eliquis.  No fevers.  Normal p.o. intake.  No back pain.  Denies history of kidney stones.      Prior to having an indwelling Crump placed in December 2024 patient would self cath daily (6 times a day).       Upon arrival to the ED, vitals were /78 HR 82 RR 20 T97.1F and SpO2 of 96% on room air. Labs significant for Na 125, UA with large LE, positive nitrite, TNTC WBC TNTC RBC and moderate bacteria. CT abdomen and pelvis without contrast showed bilateral pleural effusions. Pericardial effusion. Cardiomegaly.Bilateral renal cysts, some of which with rim calcification. Right renal mass. Also bladder clot, cannot exclude mass. Recommend CT urogram if feasible. He received 1L NS bolus, IV Zofran 4mg x 1, IV Tylenol 1g x 1 and IV CFTX 1g x 1. Crump replaced in the ED and large amount of small clots removed. Subsequently, Crump draining clear fluid without issues.     Additional history obtained from home health aide present at bedside. She mentions he was admitted to [facility] in October 2024. There were bilateral pleural effusions. Patient was admitted to [facility] and started on IV diuretics. He then underwent pericardial window and thoracentesis. Post operative course complicated by abdominal pain and distention patient developed [place]'s syndrome. Patient was treated with the decompression colonoscopy.     Aide mentions he appears much more fluid overloaded than his baseline. Noticeable in his bilateral lower extremities, bilateral arms and scrotum. She also mentions he is bedbound but appears more fatigued and short of breath at times. Aide says she was instructed by patient's daughter to hold off on his home Lasix dose today because his cardiologist said his Na level is low. He usually takes furosemide 20 mg BID. Last dose of furosemide was Wednesday (1/22/24) AM.     Currently, patient is reporting some suprapubic pain. He received IV Tylenol in the ED. Family is very cautious about using opiates because of his history of constipation. He requires a daily enema in the AM to have a bowel movement.  (24 Jan 2025 01:02)    Cardiology consulted for HF (unknown EF), CT with pericardial effusion and B/L pleural effusion. Pt. with mild SOB, +2 B/L LE edema, Family at bedside - says his edema is much better at present, Echo performed,     1/25/25: Pt awake with aide at bedside. Denies cp or sob.    1/26/25: Pt denies cp or sob    PAST MEDICAL & SURGICAL HISTORY:  Pericardial effusion s/p pericardiocentesis 10/24 - [facility]   Pleural effusions s/p thoracentesis 10/24 - [facility]   Bradycardia s/p PPM   Hypertension  Cancer of rectum  RBBB (right bundle branch block)  Asthma  TIA (transient ischemic attack)  UTI (urinary tract infection)  Enlarged prostate  Urinary retention with incomplete bladder emptying  Spinal stenosis  Chronic GERD  Rectal tumor  S/P TURP      SOCIAL HISTORY:   Alcohol: Denied  Smoking: Nonsmoker  Drug Use: Denied  Marital Status:     FAMILY HISTORY: non contributory     Home Medications:  albuterol 2.5 mg/3 mL (0.083%) inhalation solution: 3 milliliter(s) by nebulizer 1 to 2 times a day (24 Jan 2025 16:45)  atorvastatin 80 mg oral tablet: 1 tab(s) orally once a day (at bedtime) (24 Jan 2025 16:42)  cefpodoxime 200 mg oral tablet: 1 tab(s) orally every 12 hours ***ON HOLD while at *** (24 Jan 2025 16:39)  CeleBREX 400 mg oral capsule: 1 cap(s) orally once a day (23 Jan 2025 22:53)  chlordiazepoxide-clidinium 5 mg-2.5 mg oral capsule: 1 cap(s) orally 2 times a day (24 Jan 2025 16:42)  clonazePAM 2 mg oral tablet: 1 tab(s) orally once a day as needed for  anxiety (24 Jan 2025 16:45)  Dulera 100 mcg-5 mcg/inh inhalation aerosol: 2 puff(s) inhaled 2 times a day (24 Jan 2025 16:42)  Eliquis 2.5 mg oral tablet: 1 tab(s) orally 2 times a day ***ON HOLD while at *** (24 Jan 2025 16:45)  Lantus Solostar Pen 100 units/mL subcutaneous solution: 20 unit(s) subcutaneous once a day as needed for BG &gt; 150 , for BG &lt; 150 HOLD dose (24 Jan 2025 16:40)  Lasix 20 mg oral tablet: 1 tab(s) orally 2 times a day ***ON HOLD since Monday 1/20*** (24 Jan 2025 16:41)  Linzess 145 mcg oral capsule: 1 cap(s) orally once a day (24 Jan 2025 16:45)  metoprolol succinate 25 mg oral tablet, extended release: 1 tab(s) orally once a day (24 Jan 2025 16:45)    MEDICATIONS  (STANDING):  albumin human 25% IVPB 50 milliLiter(s) IV Intermittent every 12 hours  atorvastatin 80 milliGRAM(s) Oral at bedtime  ciprofloxacin     Tablet 500 milliGRAM(s) Oral every 12 hours  dextrose 5%. 1000 milliLiter(s) (50 mL/Hr) IV Continuous <Continuous>  dextrose 5%. 1000 milliLiter(s) (100 mL/Hr) IV Continuous <Continuous>  dextrose 50% Injectable 25 Gram(s) IV Push once  dextrose 50% Injectable 12.5 Gram(s) IV Push once  dextrose 50% Injectable 25 Gram(s) IV Push once  doxazosin 4 milliGRAM(s) Oral at bedtime  fluticasone propionate/ salmeterol 100-50 MICROgram(s) Diskus 1 Dose(s) Inhalation two times a day  furosemide   Injectable 40 milliGRAM(s) IV Push every 12 hours  glucagon  Injectable 1 milliGRAM(s) IntraMuscular once  insulin glargine Injectable (LANTUS) 20 Unit(s) SubCutaneous at bedtime  insulin lispro (ADMELOG) corrective regimen sliding scale   SubCutaneous three times a day before meals  insulin lispro (ADMELOG) corrective regimen sliding scale   SubCutaneous at bedtime  metoprolol succinate ER 25 milliGRAM(s) Oral daily  mineral oil enema 133 milliLiter(s) Rectal daily  sodium chloride 1 Gram(s) Oral two times a day  sulfaSALAzine 500 milliGRAM(s) Oral daily    MEDICATIONS  (PRN):  acetaminophen     Tablet .. 650 milliGRAM(s) Oral every 6 hours PRN Mild Pain (1 - 3)  acetaminophen   IVPB .. 1000 milliGRAM(s) IV Intermittent once PRN Mild Pain (1 - 3)  dextrose Oral Gel 15 Gram(s) Oral once PRN Blood Glucose LESS THAN 70 milliGRAM(s)/deciliter  melatonin 3 milliGRAM(s) Oral at bedtime PRN Insomnia  ondansetron Injectable 4 milliGRAM(s) IV Push every 6 hours PRN Nausea and/or Vomiting      Allergies - No Known Allergies    Vital Signs Last 24 Hrs  T(C): 36.7 (26 Jan 2025 08:49), Max: 37 (25 Jan 2025 21:55)  T(F): 98 (26 Jan 2025 08:49), Max: 98.6 (25 Jan 2025 21:55)  HR: 87 (26 Jan 2025 08:49) (74 - 88)  BP: 141/67 (26 Jan 2025 08:49) (128/70 - 145/61)  BP(mean): --  RR: 18 (26 Jan 2025 08:49) (17 - 18)  SpO2: 98% (26 Jan 2025 08:49) (93% - 98%)    Parameters below as of 26 Jan 2025 08:49  Patient On (Oxygen Delivery Method): nasal cannula      I&O's Summary    23 Jan 2025 07:01  -  24 Jan 2025 07:00  --------------------------------------------------------  IN: 88337 mL / OUT: 11982 mL / NET: -7800 mL    24 Jan 2025 07:01  -  24 Jan 2025 17:23  --------------------------------------------------------  IN: 6000 mL / OUT: 26663 mL / NET: -4650 mL        PHYSICAL EXAM:  Constitutional: NAD, awake and alert  HEENT:  EOMI,  Pupils round, No oral cyanosis.  Pulmonary: Non-labored, lungs clear  Cardiovascular: S1 and S2, regular rate and rhythm, no Murmurs, gallops or rubs  Gastrointestinal: Bowel Sounds present, soft, nontender.   Lymph: 1+ B/L LE edema  Neurological: Alert, no focal deficits  Skin: No rashes.  Psych:  Mood & affect appropriate    LABS: reviewed                           11.8   6.18  )-----------( 131      ( 26 Jan 2025 06:40 )             35.7                           12.5   7.21  )-----------( 106      ( 25 Jan 2025 07:47 )             39.0                           11.8   7.11  )-----------( 136      ( 24 Jan 2025 07:53 )             35.9                         13.6   6.46  )-----------( 145      ( 23 Jan 2025 14:45 )             40.9     01-26    129[L]  |  93[L]  |  15  ----------------------------<  107[H]  4.0   |  33[H]  |  0.84    Ca    9.4      26 Jan 2025 06:40    TPro  5.5[L]  /  Alb  2.1[L]  /  TBili  0.5  /  DBili  x   /  AST  15  /  ALT  7[L]  /  AlkPhos  77  01-26 01-25    124[L]  |  94[L]  |  16  ----------------------------<  103[H]  4.8   |  27  |  0.86    Ca    9.6      25 Jan 2025 07:47  Phos  3.2     01-24  Mg     1.8     01-24    TPro  5.6[L]  /  Alb  2.1[L]  /  TBili  0.4  /  DBili  x   /  AST  17  /  ALT  8[L]  /  AlkPhos  71  01-25 24 Jan 2025 06:37    124    |  95     |  12     ----------------------------<  107    4.6     |  27     |  0.77   23 Jan 2025 14:45    125    |  89     |  12     ----------------------------<  126    4.6     |  31     |  0.86     Ca    9.4        24 Jan 2025 06:37  Ca    10.2       23 Jan 2025 14:45  Phos  3.2       24 Jan 2025 06:37  Mg     1.8       24 Jan 2025 06:37    TPro  6.0    /  Alb  2.6    /  TBili  0.7    /  DBili  x      /  AST  23     /  ALT  9      /  AlkPhos  78     23 Jan 2025 14:45    PT/INR - ( 23 Jan 2025 14:45 )   PT: 14.6 sec;   INR: 1.24 ratio         PTT - ( 23 Jan 2025 14:45 )  PTT:32.5 sec      Radiology/EKG/TTE: reviewed \    < from: Xray Chest 1 View- PORTABLE-Urgent (Xray Chest 1 View- PORTABLE-Urgent .) (12.24.24 @ 16:00) >  IMPRESSION:  1. Cardiomegaly with unipolar pacemaker lead in the right ventricle,   central pulmonary venous congestion and bibasilar pleural effusions, left   greater than right, where there is also suspected left lower lobe   atelectasis. These findings are increased from the prior exam.    < end of copied text >      < from: CT Abdomen and Pelvis No Cont (01.23.25 @ 16:23) >    IMPRESSION:  Bilateral pleural effusions.    Pericardial effusion. Cardiomegaly.    Bilateral renal cysts, some of which with rim calcification. Right renal   mass. Also bladder clot, cannot exclude mass. Recommend CT urogram if   feasible.    Diverticulosis.    Extensive atherosclerosis.    < end of copied text >      < from: US Duplex Venous Upper Ext Ltd, Left (01.25.25 @ 10:46) >    IMPRESSION:  No evidence of left upper extremity deep venous thrombosis.    < end of copied text >      < from: US Duplex Venous Upper Ext Ltd, Left (01.25.25 @ 10:46) >  IMPRESSION:  No evidence of left upper extremity deep venous thrombosis.    < end of copied text >    < from: TTE W or WO Ultrasound Enhancing Agent (01.24.25 @ 14:11) >  CONCLUSIONS:      1. Mild left ventricular hypertrophy.   2. Left ventricular cavity is normal in size. Left ventricular wall thickness is mildly increased. Left ventricular systolic function isnormal with an ejection fraction visually estimated at 55 to 60 %.   3. Mildly enlarged right ventricular cavity size and normal right ventricular systolic function.   4. Left atrium is severely dilated.   5. Mild to moderate mitral regurgitation.   6. Moderate tricuspid regurgitation.   7. Estimated pulmonary artery systolic pressure is 75 mmHg, consistent with severe pulmonary hypertension.   8. The peak transaortic velocity is 2.32 m/s, peak transaortic gradient is 21.5 mmHg and mean transaortic gradient is 13.0 mmHg with an LVOT/aortic valve VTI ratio of 0.35. The effective orifice area is estimated at 1.10 cm² by the continuity equation and 1.44 cm² by 2D planimetry.   9. There is moderate calcification of the aortic valve leaflets. Moderate aortic stenosis.  10. Mild pulmonic regurgitation.  11. Moderate pericardial effusion with no echocardiographic evidence of tamponade physiology.  12. Moderate left pleural effusion noted.    ________________________________________________________________________________________    < end of copied text >

## 2025-01-26 NOTE — PROGRESS NOTE ADULT - SUBJECTIVE AND OBJECTIVE BOX
NEPHROLOGY INTERVAL HPI/OVERNIGHT EVENTS:    Date of Service: 25 @ 13:13    --No acute distress.  No new complaints.  Aide by bedside, had 2 yogurt for breakfast.   UO 4L  --Resting comfortably. Denies SOB.  Pt's daughter relates long hx of Hyponatremia and CHF /anasarca mainly managed by his Cardiologist. Dr Jenkins           Had been on varying dose of lasix with NACL tabs 1 gm QOD and then daily.           Pt had been self catheterizing( done by aide)  until  admission in 2024 for CHF and pericardial effusion ( window done)--d/c'd with indwelling catheter.    HPI:  Patient is an 89 y/o M with a PMH of A-fib on Eliquis, GERD, hypertension, hyperlipidemia, CVA, diabetes, rectal CA with chronic diarrhea from enemas, atonic bladder with self-catheterizations, status post permanent pacemaker for bradycardia and syncope and persistent known moderate sized pericardial effusion presenting to  ED on 25 for suprapubic pain.   hx from daughter at bedside  has hx of pericardial effusion s/p windon in 10/24 at [facility]  hx of pleural effusion s/p thoracentesis 10/24  has been on lasix with na of 121 and k of 5.7 on    advised byhis cardiologist to dc his lasix and start nacl tabs with dc of kcl tabs  restarted on lasix 40 qd  CTS surgery for eval fo thoracentesis   chronic robertson   CBI in progress   minimal po intake including supplements     PAST MEDICAL & SURGICAL HISTORY:  - htn   - rectal ca   - bph with chronic robertson   - diast chf  - hx of pericardial effusion s/p window  - hx of pleural effusion     MEDICATIONS  (STANDING):  albumin human 25% IVPB 50 milliLiter(s) IV Intermittent every 12 hours  atorvastatin 80 milliGRAM(s) Oral at bedtime  ciprofloxacin     Tablet 500 milliGRAM(s) Oral every 12 hours  dextrose 5%. 1000 milliLiter(s) (50 mL/Hr) IV Continuous <Continuous>  dextrose 5%. 1000 milliLiter(s) (100 mL/Hr) IV Continuous <Continuous>  dextrose 50% Injectable 25 Gram(s) IV Push once  dextrose 50% Injectable 12.5 Gram(s) IV Push once  dextrose 50% Injectable 25 Gram(s) IV Push once  doxazosin 4 milliGRAM(s) Oral at bedtime  fluticasone propionate/ salmeterol 100-50 MICROgram(s) Diskus 1 Dose(s) Inhalation two times a day  furosemide   Injectable 40 milliGRAM(s) IV Push every 12 hours  glucagon  Injectable 1 milliGRAM(s) IntraMuscular once  insulin glargine Injectable (LANTUS) 20 Unit(s) SubCutaneous at bedtime  insulin lispro (ADMELOG) corrective regimen sliding scale   SubCutaneous three times a day before meals  insulin lispro (ADMELOG) corrective regimen sliding scale   SubCutaneous at bedtime  metoprolol succinate ER 25 milliGRAM(s) Oral daily  mineral oil enema 133 milliLiter(s) Rectal daily  sodium chloride 1 Gram(s) Oral two times a day  sulfaSALAzine 500 milliGRAM(s) Oral daily    MEDICATIONS  (PRN):  acetaminophen     Tablet .. 650 milliGRAM(s) Oral every 6 hours PRN Mild Pain (1 - 3)  acetaminophen   IVPB .. 1000 milliGRAM(s) IV Intermittent once PRN Mild Pain (1 - 3)  dextrose Oral Gel 15 Gram(s) Oral once PRN Blood Glucose LESS THAN 70 milliGRAM(s)/deciliter  melatonin 3 milliGRAM(s) Oral at bedtime PRN Insomnia  ondansetron Injectable 4 milliGRAM(s) IV Push every 6 hours PRN Nausea and/or Vomiting    Vital Signs Last 24 Hrs  T(C): 36.7 (2025 08:49), Max: 37 (2025 21:55)  T(F): 98 (2025 08:49), Max: 98.6 (2025 21:55)  HR: 87 (2025 08:49) (74 - 88)  BP: 141/67 (2025 08:49) (128/70 - 145/61)  BP(mean): --  RR: 18 (2025 08:49) (17 - 18)  SpO2: 98% (2025 08:49) (93% - 98%)    Parameters below as of 2025 08:49  Patient On (Oxygen Delivery Method): nasal cannula       Daily Weight in k.9 (2025 06:04)     @ 07:01  -   @ 07:00  --------------------------------------------------------  IN: 0 mL / OUT: 4150 mL / NET: -4150 mL    PHYSICAL EXAM:  GENERAL: no distress  CHEST/LUNG: fair air entry  HEART: S1S2 RRR  ABDOMEN: soft  EXTREMITIES: anasarca.  LE edema slightly improved.  SKIN:     LABS:                        11.8   6.18  )-----------( 131      ( 2025 06:40 )             35.7         129[L]  |  93[L]  |  15  ----------------------------<  107[H]  4.0   |  33[H]  |  0.84    Ca    9.4      2025 06:40    TPro  5.5[L]  /  Alb  2.1[L]  /  TBili  0.5  /  DBili  x   /  AST  15  /  ALT  7[L]  /  AlkPhos  77        Urinalysis Basic - ( 2025 06:40 )    Color: x / Appearance: x / SG: x / pH: x  Gluc: 107 mg/dL / Ketone: x  / Bili: x / Urobili: x   Blood: x / Protein: x / Nitrite: x   Leuk Esterase: x / RBC: x / WBC x   Sq Epi: x / Non Sq Epi: x / Bacteria: x              RADIOLOGY & ADDITIONAL TESTS:

## 2025-01-26 NOTE — PROGRESS NOTE ADULT - SUBJECTIVE AND OBJECTIVE BOX
Date of service: 01-26-25 @ 08:49    Lying in bed in [facility]  More alert today  Denies pain  Urine in bag is clear    ROS: no fever or chills; denies dizziness, no HA, no SOB or cough, no abdominal pain, no diarrhea or constipation; no legs pain, no rashes    MEDICATIONS  (STANDING):  albumin human 25% IVPB 50 milliLiter(s) IV Intermittent every 12 hours  atorvastatin 80 milliGRAM(s) Oral at bedtime  cefepime  Injectable. 2000 milliGRAM(s) IV Push every 12 hours  ciprofloxacin     Tablet 500 milliGRAM(s) Oral every 12 hours  dextrose 5%. 1000 milliLiter(s) (50 mL/Hr) IV Continuous <Continuous>  dextrose 5%. 1000 milliLiter(s) (100 mL/Hr) IV Continuous <Continuous>  dextrose 50% Injectable 25 Gram(s) IV Push once  dextrose 50% Injectable 12.5 Gram(s) IV Push once  dextrose 50% Injectable 25 Gram(s) IV Push once  doxazosin 4 milliGRAM(s) Oral at bedtime  fluticasone propionate/ salmeterol 100-50 MICROgram(s) Diskus 1 Dose(s) Inhalation two times a day  furosemide   Injectable 40 milliGRAM(s) IV Push every 12 hours  glucagon  Injectable 1 milliGRAM(s) IntraMuscular once  insulin glargine Injectable (LANTUS) 20 Unit(s) SubCutaneous at bedtime  insulin lispro (ADMELOG) corrective regimen sliding scale   SubCutaneous three times a day before meals  insulin lispro (ADMELOG) corrective regimen sliding scale   SubCutaneous at bedtime  metoprolol succinate ER 25 milliGRAM(s) Oral daily  mineral oil enema 133 milliLiter(s) Rectal daily  sodium chloride 1 Gram(s) Oral two times a day  sulfaSALAzine 500 milliGRAM(s) Oral daily    Vital Signs Last 24 Hrs  T(C): 36.6 (26 Jan 2025 06:00), Max: 37 (25 Jan 2025 21:55)  T(F): 97.9 (26 Jan 2025 06:00), Max: 98.6 (25 Jan 2025 21:55)  HR: 82 (26 Jan 2025 06:00) (74 - 88)  BP: 135/77 (26 Jan 2025 06:00) (128/70 - 145/61)  BP(mean): --  RR: 17 (26 Jan 2025 06:00) (17 - 17)  SpO2: 97% (26 Jan 2025 06:00) (93% - 97%)    Parameters below as of 26 Jan 2025 06:00  Patient On (Oxygen Delivery Method): nasal cannula  O2 Flow (L/min): 2     Physical exam:    Constitutional:  No acute distress  HEENT: NC/AT, EOMI, PERRLA, conjunctivae clear; ears and nose atraumatic; pharynx benign  Neck: supple; thyroid not palpable  Back: no tenderness  Respiratory: respiratory effort normal; clear to auscultation  Cardiovascular: S1S2 regular, no murmurs  Abdomen: soft, not tender, not distended, positive BS; no liver or spleen organomegaly  Genitourinary: no suprapubic tenderness  Lymphatic: no LN palpable  Musculoskeletal: no muscle tenderness, no joint swelling or tenderness  Extremities: no pedal edema  Neurological/ Psychiatric: Alert, judgement and insight impaired; moving all extremities  Skin: no rashes; no palpable lesions    Labs: all available labs reviewed                        12.5   7.21  )-----------( 106      ( 25 Jan 2025 07:47 )             39.0     01-25    124[L]  |  94[L]  |  16  ----------------------------<  103[H]  4.8   |  27  |  0.86    Ca    9.6      25 Jan 2025 07:47  Phos  3.2     01-24  Mg     1.8     01-24    TPro  5.6[L]  /  Alb  2.1[L]  /  TBili  0.4  /  DBili  x   /  AST  17  /  ALT  8[L]  /  AlkPhos  71  01-25     LIVER FUNCTIONS - ( 25 Jan 2025 07:47 )  Alb: 2.1 g/dL / Pro: 5.6 gm/dL / ALK PHOS: 71 U/L / ALT: 8 U/L / AST: 17 U/L / GGT: x           Culture - Blood (collected 23 Jan 2025 15:45)  Source: .Blood BLOOD  Preliminary Report (24 Jan 2025 22:01):    No growth at 24 hours    Culture - Blood (collected 23 Jan 2025 15:45)  Source: .Blood BLOOD  Preliminary Report (24 Jan 2025 22:01):    No growth at 24 hours    Urinalysis with Rflx Culture (collected 23 Jan 2025 15:19)    Culture - Urine (collected 23 Jan 2025 15:19)  Source: Catheterized None  Preliminary Report (25 Jan 2025 14:15):    50,000 - 99,000 CFU/mL Pseudomonas aeruginosa    50,000 - 99,000 CFU/mL Enterococcus faecalis    Radiology: all available radiological tests reviewed    < from: CT Abdomen and Pelvis No Cont (01.23.25 @ 16:23) >  Bilateral pleural effusions.  Pericardial effusion. Cardiomegaly.  Bilateral renal cysts, some of which with rim calcification. Right renal mass. Also bladder clot, cannot exclude mass. Recommend CT urogram if feasible.  Diverticulosis.  Extensive atherosclerosis.  < end of copied text >    < from: Xray Chest 1 View- PORTABLE-Urgent (Xray Chest 1 View- PORTABLE-Urgent .) (12.24.24 @ 16:00) >  1. Cardiomegaly with unipolar pacemaker lead in the right ventricle, central pulmonary venous congestion and bibasilar pleural effusions, left greater than right, where there is also suspected left lower lobe atelectasis. These findings are increased from the prior exam.  < end of copied text >    < from: CT Abdomen and Pelvis Urogram w/wo IV Cont (01.25.25 @ 10:17) >  Resolution of previously seen urinary bladder blood clot.  No hydronephrosis or suspicious urinary tract mass identified.  Unchanged large bilateral pleural effusions.  < end of copied text >      Advanced directives addressed: full resuscitation

## 2025-01-26 NOTE — PROGRESS NOTE ADULT - ASSESSMENT
89 y/o Male with h/o A-fib on Eliquis, GERD, hypertension, hyperlipidemia, old CVA, diabetes, rectal CA with chronic diarrhea, atonic bladder s/p self-catheterizations, PM for bradycardia and syncope and persistent moderate pericardial effusion, urinary retention with Robertson cath was on 1/23/25 for suprapubic pain. He has a chronic Robertson that was placed in [facility] on 12/24/24. Robertson was functioning well until the morning of admission when the home health aide noted bright red blood in the bag. Patient also complaining of lower abdominal pain. No fever or back pain. In ER the robertson was changed and large amount of small clots removed. In ER he received ceftriaxone. He was reported with new bacteriuria.     #Bacteriuria with PSAE and ENFA. Likely true pathogens since patient is symptomatic. Likely UTI   #Atonic bladder with urinary retention and Robertson catheter  #Bilateral pleural effusions.  #Pericardial effusion. Cardiomegaly.  #Bilateral renal cysts.  #Urinary bladder blood clot resolved  #Rectal Ca.   -new imaging reviewed  -cultures reviewed  -on cefepime 2 gm IV q12h and cipro  500 mg PO q12h # 2  -tolerating abx well so far; no side effects noted  -f/u cultures  -coonitnue abx coverage   -monitor temps  -f/u CBC  -supportive care  2. Other issues:   -care per medicine    d/w medicine team

## 2025-01-26 NOTE — PROGRESS NOTE ADULT - PROBLEM SELECTOR PLAN 4
·  Problem: Cardiac pacemaker.   ·  Recommendation: EP to interrogate.
·  Problem: Cardiac pacemaker.   ·  Recommendation: EP to interrogate.

## 2025-01-26 NOTE — PROGRESS NOTE ADULT - PROBLEM SELECTOR PLAN 1
·  Problem: HF (heart failure).   ·  Recommendation: chronic HF (unknown EF), with recent pericardial effusion s/.p pericardiocentesis and pleural effusion s/p thoracentesis at [facility] in 10/24, now with pericardial and B/L pleural effusion left>right   Recommendation: Echo performed, results pending, lasix 40 mg ivp daily started yesterday, Na today 124; . Would hold am dose given decrease NA.  Will reeval labs in am.  Renal input appreciated.  . CW toprol xl 25 mg po daily,  .  CT Sx consult appreciated - will monitor with cxr, daily weight.  Nothing to do in acute setting ·  Problem: HF (heart failure).   ·  Recommendation: chronic HF (unknown EF), with recent pericardial effusion s/.p pericardiocentesis and pleural effusion s/p thoracentesis at [facility] in 10/24, now with pericardial and B/L pleural effusion left>right   Recommendation: Echo performed, NL LVF, EF 55-60%, mild-Mod MR, Mod TR, severe pulmonary HTN.  Na improved today 129  IV lasix increased to 40mg IV BID.  Nephrology following.  Closely trend renal function  . CW toprol xl 25 mg po daily,  .  CT Sx consult appreciated - will monitor with cxr, IV diuresis, daily weight.  Nothing to do in acute setting ·  Problem: HF (heart failure).   ·  Recommendation: chronic HFpEF with recent pericardial effusion s/.p pericardiocentesis and pleural effusion s/p thoracentesis at [facility] in 10/24, now with pericardial and B/L pleural effusion left>right   Recommendation: Echo performed, NL LVF, EF 55-60%, mild-Mod MR, Mod TR, severe pulmonary HTN.  Na improved today 129  IV lasix increased to 40mg IV BID.  Nephrology following.  Closely trend renal function  . CW toprol xl 25 mg po daily,  .  CT Sx consult appreciated - will monitor with cxr, IV diuresis, daily weight.  Nothing to do in acute setting

## 2025-01-26 NOTE — PROGRESS NOTE ADULT - ASSESSMENT
89 y/o M with a PMH of A-fib on Eliquis, GERD, hypertension, hyperlipidemia, CVA, diabetes, rectal CA with chronic diarrhea from enemas, atonic bladder with self-catheterizations, status post permanent pacemaker for bradycardia and syncope and persistent known moderate sized pericardial effusion presenting to  ED on 1/23/25 for suprapubic pain.   noted with hyponatremia likely depletional  b/l pleural effusion   suprapubic pain with hematuria     REC  - urine osm, serum osm   - serum electrolytes  - with dec of na with lasix 40 iv started, will hold dose and fu the serum and urine  studies send   - CTS input noted: intervention planned,   - no need for FR, pt consuming minimal po intake  - inc intake encourage, refuses any  nutritional supplements   - CBI in progress   - on ctx fu ucx    1/25 SY  --Hyponatremia in the setting of CHF decompensation and anasarca      Will need to continue diuresis with an attempt to stabilize serum sodium.     Add SPA for more effective diuresis     Restart NACL tabs 1 gm BID.  --UTI/hematuria : CBI per ,  Continue Ceftriaxone 1 gm daily.  --Bladder : clot vs mass :  follow up.  D/w Dr Kelley    D/w family.    1/26 SY  --Hyponatremia in the setting of CHF decompensation and anasarca      Responding well to SPA/IV Lasix and salt tabs     Continue for another 2-3 days to optimize fluid and electrolytes prior to changing to home maintenance PO meds.  --UTI/hematuria : cleared, off CBI x 2 days. Abtx changed to po Cipro 500mg q 12 h.  --Bladder : clot vs mass :  follow up.  D/w Dr Kelley

## 2025-01-26 NOTE — PROGRESS NOTE ADULT - PROBLEM SELECTOR PLAN 2
·  Problem: Pericardial effusion.   ·  Recommendation: pt; with recent pericardial effusion s/.p pericardiocentesis on 10/24 at [facility], CT shows pericardial effusion and cardiomegaly, Echo results pending ·  Problem: Pericardial effusion.   ·  Recommendation: pt; with recent pericardial effusion s/.p pericardiocentesis on 10/24 at [facility], CT shows pericardial effusion and cardiomegaly, Echo results as above

## 2025-01-27 LAB
ALBUMIN SERPL ELPH-MCNC: 2.3 G/DL — LOW (ref 3.3–5)
ANION GAP SERPL CALC-SCNC: 3 MMOL/L — LOW (ref 5–17)
BUN SERPL-MCNC: 17 MG/DL — SIGNIFICANT CHANGE UP (ref 7–23)
CALCIUM SERPL-MCNC: 9.5 MG/DL — SIGNIFICANT CHANGE UP (ref 8.5–10.1)
CHLORIDE SERPL-SCNC: 92 MMOL/L — LOW (ref 96–108)
CO2 SERPL-SCNC: 36 MMOL/L — HIGH (ref 22–31)
CREAT SERPL-MCNC: 0.81 MG/DL — SIGNIFICANT CHANGE UP (ref 0.5–1.3)
EGFR: 84 ML/MIN/1.73M2 — SIGNIFICANT CHANGE UP
GLUCOSE BLDC GLUCOMTR-MCNC: 124 MG/DL — HIGH (ref 70–99)
GLUCOSE BLDC GLUCOMTR-MCNC: 151 MG/DL — HIGH (ref 70–99)
GLUCOSE BLDC GLUCOMTR-MCNC: 89 MG/DL — SIGNIFICANT CHANGE UP (ref 70–99)
GLUCOSE SERPL-MCNC: 107 MG/DL — HIGH (ref 70–99)
HCT VFR BLD CALC: 33.6 % — LOW (ref 39–50)
HGB BLD-MCNC: 11 G/DL — LOW (ref 13–17)
MAGNESIUM SERPL-MCNC: 1.5 MG/DL — LOW (ref 1.6–2.6)
MCHC RBC-ENTMCNC: 29.7 PG — SIGNIFICANT CHANGE UP (ref 27–34)
MCHC RBC-ENTMCNC: 32.7 G/DL — SIGNIFICANT CHANGE UP (ref 32–36)
MCV RBC AUTO: 90.8 FL — SIGNIFICANT CHANGE UP (ref 80–100)
PHOSPHATE SERPL-MCNC: 3.1 MG/DL — SIGNIFICANT CHANGE UP (ref 2.5–4.5)
PLATELET # BLD AUTO: 134 K/UL — LOW (ref 150–400)
POTASSIUM SERPL-MCNC: 3.3 MMOL/L — LOW (ref 3.5–5.3)
POTASSIUM SERPL-SCNC: 3.3 MMOL/L — LOW (ref 3.5–5.3)
RBC # BLD: 3.7 M/UL — LOW (ref 4.2–5.8)
RBC # FLD: 15.2 % — HIGH (ref 10.3–14.5)
SODIUM SERPL-SCNC: 131 MMOL/L — LOW (ref 135–145)
WBC # BLD: 5.1 K/UL — SIGNIFICANT CHANGE UP (ref 3.8–10.5)
WBC # FLD AUTO: 5.1 K/UL — SIGNIFICANT CHANGE UP (ref 3.8–10.5)

## 2025-01-27 PROCEDURE — 99232 SBSQ HOSP IP/OBS MODERATE 35: CPT

## 2025-01-27 PROCEDURE — 99233 SBSQ HOSP IP/OBS HIGH 50: CPT | Mod: FS

## 2025-01-27 RX ORDER — ANTISEPTIC SURGICAL SCRUB 0.04 MG/ML
1 SOLUTION TOPICAL
Refills: 0 | Status: DISCONTINUED | OUTPATIENT
Start: 2025-01-27 | End: 2025-01-29

## 2025-01-27 RX ORDER — POTASSIUM CHLORIDE 750 MG/1
40 TABLET, EXTENDED RELEASE ORAL EVERY 4 HOURS
Refills: 0 | Status: COMPLETED | OUTPATIENT
Start: 2025-01-27 | End: 2025-01-27

## 2025-01-27 RX ORDER — MAGNESIUM SULFATE 0.8 MEQ/ML
1 AMPUL (ML) INJECTION ONCE
Refills: 0 | Status: COMPLETED | OUTPATIENT
Start: 2025-01-27 | End: 2025-01-27

## 2025-01-27 RX ORDER — APIXABAN 5 MG/1
2.5 TABLET, FILM COATED ORAL
Refills: 0 | Status: DISCONTINUED | OUTPATIENT
Start: 2025-01-27 | End: 2025-01-27

## 2025-01-27 RX ORDER — POTASSIUM CHLORIDE 750 MG/1
40 TABLET, EXTENDED RELEASE ORAL
Refills: 0 | Status: COMPLETED | OUTPATIENT
Start: 2025-01-27 | End: 2025-01-28

## 2025-01-27 RX ORDER — ENOXAPARIN SODIUM 100 MG/ML
80 INJECTION SUBCUTANEOUS EVERY 12 HOURS
Refills: 0 | Status: DISCONTINUED | OUTPATIENT
Start: 2025-01-27 | End: 2025-01-28

## 2025-01-27 RX ADMIN — Medication 1 GRAM(S): at 10:23

## 2025-01-27 RX ADMIN — FLUTICASONE PROPIONATE AND SALMETEROL 1 DOSE(S): 113; 14 POWDER, METERED RESPIRATORY (INHALATION) at 20:24

## 2025-01-27 RX ADMIN — POTASSIUM CHLORIDE 40 MILLIEQUIVALENT(S): 750 TABLET, EXTENDED RELEASE ORAL at 21:49

## 2025-01-27 RX ADMIN — Medication 25 MILLIGRAM(S): at 10:24

## 2025-01-27 RX ADMIN — ALBUMIN HUMAN 50 MILLILITER(S): 50 SOLUTION INTRAVENOUS at 18:01

## 2025-01-27 RX ADMIN — Medication 40 MILLIGRAM(S): at 18:53

## 2025-01-27 RX ADMIN — ENOXAPARIN SODIUM 80 MILLIGRAM(S): 100 INJECTION SUBCUTANEOUS at 21:49

## 2025-01-27 RX ADMIN — ALBUMIN HUMAN 50 MILLILITER(S): 50 SOLUTION INTRAVENOUS at 05:09

## 2025-01-27 RX ADMIN — Medication 500 MILLIGRAM(S): at 10:23

## 2025-01-27 RX ADMIN — Medication 1 GRAM(S): at 21:49

## 2025-01-27 RX ADMIN — Medication 100 GRAM(S): at 10:25

## 2025-01-27 RX ADMIN — ATORVASTATIN CALCIUM 80 MILLIGRAM(S): 80 TABLET, FILM COATED ORAL at 21:48

## 2025-01-27 RX ADMIN — Medication 40 MILLIGRAM(S): at 05:10

## 2025-01-27 RX ADMIN — Medication 4 MILLIGRAM(S): at 21:49

## 2025-01-27 RX ADMIN — FLUTICASONE PROPIONATE AND SALMETEROL 1 DOSE(S): 113; 14 POWDER, METERED RESPIRATORY (INHALATION) at 09:28

## 2025-01-27 RX ADMIN — ACETAMINOPHEN, DIPHENHYDRAMINE HCL, PHENYLEPHRINE HCL 3 MILLIGRAM(S): 325; 25; 5 TABLET ORAL at 22:00

## 2025-01-27 RX ADMIN — Medication 500 MILLIGRAM(S): at 21:48

## 2025-01-27 RX ADMIN — INSULIN GLARGINE-YFGN 20 UNIT(S): 100 INJECTION, SOLUTION SUBCUTANEOUS at 21:49

## 2025-01-27 NOTE — PROGRESS NOTE ADULT - SUBJECTIVE AND OBJECTIVE BOX
Patient seen at bedside with his personal aide. Patient is laying in bed comfortably, denies any pain or discomfort. Hematuria has resolved.    PE  VITALS  T(C): 36.3 (01-27-25 @ 08:24), Max: 36.4 (01-26-25 @ 15:58)  HR: 80 (01-27-25 @ 08:24) (78 - 87)  BP: 123/68 (01-27-25 @ 08:24) (115/56 - 123/68)  RR: 17 (01-27-25 @ 08:24) (17 - 18)  SpO2: 100% (01-27-25 @ 08:24) (95% - 100%)     Lung    : No resp distress  Abdo:   : Soft, Non tender, No guarding, No distension   Back    : No CVAT b/l  Genitalia Male: Crump with yellow urine off CBI.      LABS                          11.0   5.10  )-----------( 134      ( 27 Jan 2025 06:17 )             33.6   01-27    131[L]  |  92[L]  |  17  ----------------------------<  107[H]  3.3[L]   |  36[H]  |  0.81    Ca    9.5      27 Jan 2025 06:17  Phos  3.1     01-27  Mg     1.5     01-27    TPro  x   /  Alb  2.3[L]  /  TBili  x   /  DBili  x   /  AST  x   /  ALT  x   /  AlkPhos  x   01-27

## 2025-01-27 NOTE — GOALS OF CARE CONVERSATION - ADVANCED CARE PLANNING - CONVERSATION DETAILS
Team met with pt. to follow up and provide support. Pts daughter spoke with pt. over the weekend regarding Hospice and his wishes. We discussed with pt. Hospice services and philosophy of care if at any point he wishes to just focus on his comfort. Pt. share that he fully understands. He is not ready for Hospice at this time but was receptive to the information. We discussed pts wishes regarding resuscitation and intubation. Pt. shared that he did not let his wife go through those interventions and he does not want to go through them himself. Feelings explored and support provided. Pt. is clear he would not want resuscitation or intubation and consented to DNR/DNI. MOLST completed DNR/DNI Trial Non Invasive. Plan likely for pt. to return home with his 24/7 aides. Emotional support provided. Our team will continue to follow.

## 2025-01-27 NOTE — PROGRESS NOTE ADULT - ASSESSMENT
89 y/o M with a PMH of A-fib on Eliquis, GERD, hypertension, hyperlipidemia, CVA, diabetes, rectal CA with chronic diarrhea from enemas, atonic bladder with self-catheterizations, status post permanent pacemaker for bradycardia and syncope and persistent known moderate sized pericardial effusion presenting to  ED on 1/23/25 for suprapubic pain. He has a chronic Robertson that was placed in [facility] on 12/24/24. Robertson was functioning well until the morning of admission when the home health aide noted bright red blood in the bag. Patient also complaining of lower abdominal pain.  Patient is on daily Eliquis.  No fevers.  Normal p.o. intake.  No back pain.  Denies history of kidney stones. Prior to having an indwelling Robertson placed in December 2024 patient would self cath daily (6 times a day).    #Hematuria   # MDRO UTI   - Robertson changed in the ED - now without blood appreciated   - Hgb stable    - Abnormal UA  - SP rocephin  - neg blood cultures   - Urine growing MDRO pseud and E Faecalis  - Continue cipro  - ID consult appreciated   - F/u urology consult  - CT urogram negative for bladder mass  - Restart eliquis    #Moderate to large bilateral pleural effusions  # Likely  Acute on chronic HFpEF  # Pericardial effusion   - As per CT abdomen and pelvis   - Thoracic on board, monitor on lasix    - BNP elevated  - Echo reviewed as above  - Lasix IV BID  - Albumin BID  - Moderate pericardial effusion SP pericardial window 10/24  - Cardiology consult appreciated    #Hyponatremia   - hypervolemic hyponatremia   - He received 1L NS bolus in the ED  -  today  - Continue lasix   - F/U urine/serum studies   - Nephrology consult appreciated  - Add NACL  - Continue albumin    # Hypokalemia  - Replete     # LUE swelling  - Doppler negative  - Likely infiltrate     #Afib   -  Restart eliquis and monitor   - Continue metoprolol succinate 25 mg QD    # History of UC  - Continue sulfasalazine    # BPH  - Chronic robertson  - Will continue doxasozin till urology sees    #T2DM  - Continue lantus  - Continue insulin sliding scale    #DVT ppx   - Eliquis   - SCDs 89 y/o M with a PMH of A-fib on Eliquis, GERD, hypertension, hyperlipidemia, CVA, diabetes, rectal CA with chronic diarrhea from enemas, atonic bladder with self-catheterizations, status post permanent pacemaker for bradycardia and syncope and persistent known moderate sized pericardial effusion presenting to  ED on 1/23/25 for suprapubic pain. He has a chronic Robertson that was placed in [facility] on 12/24/24. Robertson was functioning well until the morning of admission when the home health aide noted bright red blood in the bag. Patient also complaining of lower abdominal pain.  Patient is on daily Eliquis.  No fevers.  Normal p.o. intake.  No back pain.  Denies history of kidney stones. Prior to having an indwelling Robertson placed in December 2024 patient would self cath daily (6 times a day).    #Hematuria   # MDRO UTI   - Robertson changed in the ED - now without blood appreciated   - Hgb stable    - Abnormal UA  - SP rocephin  - neg blood cultures   - Urine growing MDRO pseud and E Faecalis  - Continue cipro  - ID consult appreciated   - F/u urology consult  - CT urogram negative for bladder mass  - Restart eliquis    #Moderate to large bilateral pleural effusions  # Likely  Acute on chronic HFpEF  # Pericardial effusion   - As per CT abdomen and pelvis   - Thoracic on board, monitor on lasix    - BNP elevated  - Echo reviewed as above  - Lasix IV BID  - Albumin BID  - Moderate pericardial effusion SP pericardial window 10/24  - Cardiology consult appreciated    #Hyponatremia   - hypervolemic hyponatremia   - He received 1L NS bolus in the ED  -  today  - Continue lasix   - F/U urine/serum studies   - Nephrology consult appreciated  - Add NACL  - Continue albumin    # Hypokalemia  - Replete     # LUE swelling  - Doppler negative  - Likely infiltrate     #Afib   -  Restart eliquis and monitor   - Continue metoprolol succinate 25 mg QD    # History of UC  - Continue sulfasalazine    # BPH  - Chronic robertson  - Will continue doxasozin till urology sees    #T2DM  - Continue lantus  - Continue insulin sliding scale    #DVT ppx   - Eliquis   - SCDs      # Dispo: FU Thor/Card recs, finish abx. Restarted eliquis monitor for hematuria. DC in 2-3 days  89 y/o M with a PMH of A-fib on Eliquis, GERD, hypertension, hyperlipidemia, CVA, diabetes, rectal CA with chronic diarrhea from enemas, atonic bladder with self-catheterizations, status post permanent pacemaker for bradycardia and syncope and persistent known moderate sized pericardial effusion presenting to  ED on 1/23/25 for suprapubic pain. He has a chronic Robertson that was placed in [facility] on 12/24/24. Robertson was functioning well until the morning of admission when the home health aide noted bright red blood in the bag. Patient also complaining of lower abdominal pain.  Patient is on daily Eliquis.  No fevers.  Normal p.o. intake.  No back pain.  Denies history of kidney stones. Prior to having an indwelling Robertson placed in December 2024 patient would self cath daily (6 times a day).    #Hematuria   # MDRO UTI   - Robertson changed in the ED - now without blood appreciated   - Hgb stable    - Abnormal UA  - SP rocephin  - neg blood cultures   - Urine growing MDRO pseud and E Faecalis  - Continue cipro  - ID consult appreciated   - F/u urology consult  - CT urogram negative for bladder mass  - restart lovenox 1mg/kg q 12    #Moderate to large bilateral pleural effusions  # Likely  Acute on chronic HFpEF  # Pericardial effusion   - As per CT abdomen and pelvis   - Thoracic on board, monitor on lasix    - BNP elevated  - Echo reviewed as above  - Lasix IV BID  - Albumin BID  - Moderate pericardial effusion SP pericardial window 10/24  - Cardiology consult appreciated    #Hyponatremia   - hypervolemic hyponatremia   - He received 1L NS bolus in the ED  -  today  - Continue lasix   - F/U urine/serum studies   - Nephrology consult appreciated  - Add NACL  - Continue albumin    # Hypokalemia  - Replete     # LUE swelling  - Doppler negative  - Likely infiltrate     #Afib   -  Lovenox 1mg/kg q 12  - Continue metoprolol succinate 25 mg QD    # History of UC  - Continue sulfasalazine    # BPH  - Chronic robertson  - Will continue doxasozin till urology sees    #T2DM  - Continue lantus  - Continue insulin sliding scale    #DVT ppx   - Lovenox   - SCDs      # Dispo: FU Thor/Card recs, finish abx. Restarted lovenox monitor for hematuria. DC in 2-3 days

## 2025-01-27 NOTE — PROGRESS NOTE ADULT - SUBJECTIVE AND OBJECTIVE BOX
Patient canceled her 12/21 appointment due to transportation issues.     She states she will call back to reschedule.   NEPHROLOGY INTERVAL HPI/OVERNIGHT EVENTS:    Date of Service: 25 @ 14:37    --No acute distress. resting comfortably.  ate tuna without bread for lunch.  UO 2.6L  --No acute distress.  No new complaints.  Aide by bedside, had 2 yogurt for breakfast.   UO 4L  --Resting comfortably. Denies SOB.  Pt's daughter relates long hx of Hyponatremia and CHF /anasarca mainly managed by his Cardiologist. Dr Jenkins           Had been on varying dose of lasix with NACL tabs 1 gm QOD and then daily.           Pt had been self catheterizing( done by aide)  until  admission in 2024 for CHF and pericardial effusion ( window done)--d/c'd with indwelling catheter.    HPI:  Patient is an 89 y/o M with a PMH of A-fib on Eliquis, GERD, hypertension, hyperlipidemia, CVA, diabetes, rectal CA with chronic diarrhea from enemas, atonic bladder with self-catheterizations, status post permanent pacemaker for bradycardia and syncope and persistent known moderate sized pericardial effusion presenting to  ED on 25 for suprapubic pain.   hx from daughter at bedside  has hx of pericardial effusion s/p windon in 10/24 at [facility]  hx of pleural effusion s/p thoracentesis 10/24  has been on lasix with na of 121 and k of 5.7 on    advised byhis cardiologist to dc his lasix and start nacl tabs with dc of kcl tabs  restarted on lasix 40 qd  CTS surgery for eval fo thoracentesis   chronic robertson   CBI in progress   minimal po intake including supplements     PAST MEDICAL & SURGICAL HISTORY:  - htn   - rectal ca   - bph with chronic robertson   - diast chf  - hx of pericardial effusion s/p window  - hx of pleural effusion     MEDICATIONS  (STANDING):  albumin human 25% IVPB 50 milliLiter(s) IV Intermittent every 12 hours  apixaban 2.5 milliGRAM(s) Oral two times a day  atorvastatin 80 milliGRAM(s) Oral at bedtime  chlorhexidine 4% Liquid 1 Application(s) Topical <User Schedule>  ciprofloxacin     Tablet 500 milliGRAM(s) Oral every 12 hours  dextrose 5%. 1000 milliLiter(s) (50 mL/Hr) IV Continuous <Continuous>  dextrose 5%. 1000 milliLiter(s) (100 mL/Hr) IV Continuous <Continuous>  dextrose 50% Injectable 25 Gram(s) IV Push once  dextrose 50% Injectable 12.5 Gram(s) IV Push once  dextrose 50% Injectable 25 Gram(s) IV Push once  doxazosin 4 milliGRAM(s) Oral at bedtime  fluticasone propionate/ salmeterol 100-50 MICROgram(s) Diskus 1 Dose(s) Inhalation two times a day  furosemide   Injectable 40 milliGRAM(s) IV Push every 12 hours  glucagon  Injectable 1 milliGRAM(s) IntraMuscular once  insulin glargine Injectable (LANTUS) 20 Unit(s) SubCutaneous at bedtime  insulin lispro (ADMELOG) corrective regimen sliding scale   SubCutaneous three times a day before meals  insulin lispro (ADMELOG) corrective regimen sliding scale   SubCutaneous at bedtime  metoprolol succinate ER 25 milliGRAM(s) Oral daily  mineral oil enema 133 milliLiter(s) Rectal daily  potassium chloride   Powder 40 milliEquivalent(s) Oral two times a day  sodium chloride 1 Gram(s) Oral two times a day  sulfaSALAzine 500 milliGRAM(s) Oral daily    MEDICATIONS  (PRN):  acetaminophen     Tablet .. 650 milliGRAM(s) Oral every 6 hours PRN Mild Pain (1 - 3)  acetaminophen   IVPB .. 1000 milliGRAM(s) IV Intermittent once PRN Mild Pain (1 - 3)  dextrose Oral Gel 15 Gram(s) Oral once PRN Blood Glucose LESS THAN 70 milliGRAM(s)/deciliter  melatonin 3 milliGRAM(s) Oral at bedtime PRN Insomnia  ondansetron Injectable 4 milliGRAM(s) IV Push every 6 hours PRN Nausea and/or Vomiting    Vital Signs Last 24 Hrs  T(C): 36.3 (2025 08:24), Max: 36.4 (2025 15:58)  T(F): 97.3 (2025 08:24), Max: 97.6 (2025 15:58)  HR: 80 (2025 08:24) (78 - 87)  BP: 123/68 (2025 08:24) (115/56 - 123/68)  BP(mean): --  RR: 17 (2025 08:24) (17 - 18)  SpO2: 100% (2025 08:24) (95% - 100%)    Parameters below as of 2025 08:24  Patient On (Oxygen Delivery Method): nasal cannula  O2 Flow (L/min): 2    Daily     Daily Weight in k.1 (2025 04:59)     @ 07: @ 07:00  --------------------------------------------------------  IN: 0 mL / OUT: 2600 mL / NET: -2600 mL     @ 07: @ 14:37  --------------------------------------------------------  IN: 0 mL / OUT: 1950 mL / NET: -1950 mL    PHYSICAL EXAM:  GENERAL: no distress  CHEST/LUNG: fair air entry  HEART: S1S2 RRR  ABDOMEN: soft  EXTREMITIES: anasarca.  SKIN:     LABS:                        11.0   5.10  )-----------( 134      ( 2025 06:17 )             33.6         131[L]  |  92[L]  |  17  ----------------------------<  107[H]  3.3[L]   |  36[H]  |  0.81    Ca    9.5      2025 06:17  Phos  3.1       Mg     1.5         TPro  x   /  Alb  2.3[L]  /  TBili  x   /  DBili  x   /  AST  x   /  ALT  x   /  AlkPhos  x         Urinalysis Basic - ( 2025 06:17 )    Color: x / Appearance: x / SG: x / pH: x  Gluc: 107 mg/dL / Ketone: x  / Bili: x / Urobili: x   Blood: x / Protein: x / Nitrite: x   Leuk Esterase: x / RBC: x / WBC x   Sq Epi: x / Non Sq Epi: x / Bacteria: x      Magnesium: 1.5 mg/dL ( @ 06:17)  Phosphorus: 3.1 mg/dL ( @ 06:17)          RADIOLOGY & ADDITIONAL TESTS:

## 2025-01-27 NOTE — PROGRESS NOTE ADULT - SUBJECTIVE AND OBJECTIVE BOX
HOSPITALIST ATTENDING PROGRESS NOTE    Chart and meds reviewed.  Patient seen and examined.    CC: Hematuria    Subjective: Feels better, no chest pain or sob. No Fever    All other systems reviewed and found to be negative with the exception of what has been described above.    MEDICATIONS  (STANDING):  albumin human 25% IVPB 50 milliLiter(s) IV Intermittent every 12 hours  atorvastatin 80 milliGRAM(s) Oral at bedtime  chlorhexidine 4% Liquid 1 Application(s) Topical <User Schedule>  ciprofloxacin     Tablet 500 milliGRAM(s) Oral every 12 hours  dextrose 5%. 1000 milliLiter(s) (50 mL/Hr) IV Continuous <Continuous>  dextrose 5%. 1000 milliLiter(s) (100 mL/Hr) IV Continuous <Continuous>  dextrose 50% Injectable 25 Gram(s) IV Push once  dextrose 50% Injectable 12.5 Gram(s) IV Push once  dextrose 50% Injectable 25 Gram(s) IV Push once  doxazosin 4 milliGRAM(s) Oral at bedtime  fluticasone propionate/ salmeterol 100-50 MICROgram(s) Diskus 1 Dose(s) Inhalation two times a day  furosemide   Injectable 40 milliGRAM(s) IV Push every 12 hours  glucagon  Injectable 1 milliGRAM(s) IntraMuscular once  insulin glargine Injectable (LANTUS) 20 Unit(s) SubCutaneous at bedtime  insulin lispro (ADMELOG) corrective regimen sliding scale   SubCutaneous three times a day before meals  insulin lispro (ADMELOG) corrective regimen sliding scale   SubCutaneous at bedtime  metoprolol succinate ER 25 milliGRAM(s) Oral daily  mineral oil enema 133 milliLiter(s) Rectal daily  potassium chloride    Tablet ER 40 milliEquivalent(s) Oral every 4 hours  sodium chloride 1 Gram(s) Oral two times a day  sulfaSALAzine 500 milliGRAM(s) Oral daily    MEDICATIONS  (PRN):  acetaminophen     Tablet .. 650 milliGRAM(s) Oral every 6 hours PRN Mild Pain (1 - 3)  acetaminophen   IVPB .. 1000 milliGRAM(s) IV Intermittent once PRN Mild Pain (1 - 3)  dextrose Oral Gel 15 Gram(s) Oral once PRN Blood Glucose LESS THAN 70 milliGRAM(s)/deciliter  melatonin 3 milliGRAM(s) Oral at bedtime PRN Insomnia  ondansetron Injectable 4 milliGRAM(s) IV Push every 6 hours PRN Nausea and/or Vomiting    GEN: Flat affect, NAD  HEENT:  pupils equal and reactive, EOMI, no oropharyngeal lesions, erythema, exudates, oral thrush  NECK:   supple, no carotid bruits  CV:  +S1, +S2, regular, no murmurs  RESP:   lungs clear to auscultation bilaterally, no wheezing, rales, rhonchi, good air entry bilaterally  GI:  abdomen soft, non-tender, non-distended, normal BS  EXT:  no clubbing, no cyanosis, 2 + edema, no calf pain, swelling or erythema. LUE swelling  NEURO:  AAOX3, no focal neurological deficits, follows all commands, able to move extremities spontaneously  SKIN:  no ulcers, lesions or rashes  : Crump     LABS:                          11.0   5.10  )-----------( 134      ( 27 Jan 2025 06:17 )             33.6     01-27    131[L]  |  92[L]  |  17  ----------------------------<  107[H]  3.3[L]   |  36[H]  |  0.81    Ca    9.5      27 Jan 2025 06:17  Phos  3.1     01-27  Mg     1.5     01-27    TPro  x   /  Alb  2.3[L]  /  TBili  x   /  DBili  x   /  AST  x   /  ALT  x   /  AlkPhos  x   01-27    LIVER FUNCTIONS - ( 27 Jan 2025 06:17 )  Alb: 2.3 g/dL / Pro: x     / ALK PHOS: x     / ALT: x     / AST: x     / GGT: x           Urinalysis Basic - ( 27 Jan 2025 06:17 )  Color: x / Appearance: x / SG: x / pH: x  Gluc: 107 mg/dL / Ketone: x  / Bili: x / Urobili: x   Blood: x / Protein: x / Nitrite: x   Leuk Esterase: x / RBC: x / WBC x   Sq Epi: x / Non Sq Epi: x / Bacteria: x     US Duplex Venous Upper Ext Ltd, Left (01.25.25 @ 10:46) >  IMPRESSION:  No evidence of left upper extremity deep venous thrombosis.    CT Abdomen and Pelvis Urogram w/wo IV Cont (01.25.25 @ 10:17) >    IMPRESSION:  Resolution of previously seen urinary bladder blood clot.    No hydronephrosis or suspicious urinary tract mass identified.    Unchanged large bilateral pleural effusions.    Unchanged small pericardial effusion.         CT Abdomen and Pelvis No Cont (01.23.25 @ 16:23) >    IMPRESSION:  Bilateral pleural effusions.    Pericardial effusion. Cardiomegaly.    Bilateral renal cysts, some of which with rim calcification. Right renal   mass. Also bladder clot, cannot exclude mass. Recommend CT urogram if   feasible.    Diverticulosis.    Extensive atherosclerosis.

## 2025-01-27 NOTE — PROGRESS NOTE ADULT - ASSESSMENT
89 yo male with hematuria requiring CBI. Now resolved off CBI. HH stable  Dr. Nogueira discussed the following with pt's daughter in law.  "Discussed gross hematuria possibly secondary to catheter trauma.  Discussed  management option of continued indwelling Robertson catheter versus reverting back to clean intermittent catheterization versus suprapubic catheter.    Discussed risk and benefits of each."  Recommend  Patient can be discharged with indwelling robertson  Patient can follow up with urologist at [facility] for further management  Reconsult urology as needed.    Case discussed with Dr. Nogueira

## 2025-01-27 NOTE — PROGRESS NOTE ADULT - PROBLEM SELECTOR PLAN 1
·  Problem: HF (heart failure).   ·  Recommendation: chronic HFpEF with recent pericardial effusion s/.p pericardiocentesis and pleural effusion s/p thoracentesis at [facility] in 10/24, now with pericardial and B/L pleural effusion left>right   Recommendation: Echo performed, NL LVF, EF 55-60%, mild-Mod MR, Mod TR, severe pulmonary HTN.    . cont diuresis with lasix 40 mg ivp bid with close monitor of renal function   .  CT Sx consult appreciated - will monitor with cxr, IV diuresis, daily weight.  Nothing to do in acute setting

## 2025-01-27 NOTE — PROGRESS NOTE ADULT - ASSESSMENT
89 y/o M with a PMH of A-fib on Eliquis, GERD, hypertension, hyperlipidemia, CVA, diabetes, rectal CA with chronic diarrhea from enemas, atonic bladder with self-catheterizations, status post permanent pacemaker for bradycardia and syncope and persistent known moderate sized pericardial effusion presenting to  ED on 1/23/25 for suprapubic pain.   noted with hyponatremia likely depletional  b/l pleural effusion   suprapubic pain with hematuria     REC  - urine osm, serum osm   - serum electrolytes  - with dec of na with lasix 40 iv started, will hold dose and fu the serum and urine  studies send   - CTS input noted: intervention planned,   - no need for FR, pt consuming minimal po intake  - inc intake encourage, refuses any  nutritional supplements   - CBI in progress   - on ctx fu ucx    1/25 SY  --Hyponatremia in the setting of CHF decompensation and anasarca      Will need to continue diuresis with an attempt to stabilize serum sodium.     Add SPA for more effective diuresis     Restart NACL tabs 1 gm BID.  --UTI/hematuria : CBI per ,  Continue Ceftriaxone 1 gm daily.  --Bladder : clot vs mass :  follow up.  D/w Dr Kelley    D/w family.    1/26 SY  --Hyponatremia in the setting of CHF decompensation and anasarca      Responding well to SPA/IV Lasix and salt tabs     Continue for another 2-3 days to optimize fluid and electrolytes prior to changing to home maintenance PO meds.  --UTI/hematuria : cleared, off CBI x 2 days. Abtx changed to po Cipro 500mg q 12 h.  --Bladder : clot vs mass :  follow up.  D/w Dr Kelley    1/27 SY  --Hyponatremia in the setting of CHF decompensation and anasarca      Continues to diurese well with SPA/IV Lasix and salt tabs     Continue for another 2-3 days to optimize fluid and electrolytes prior to changing to home maintenance PO meds.  --UTI/hematuria : cleared, off CBI x 3 days. Abtx changed to po Cipro 500mg q 12 h.  --Bladder : clot vs mass :  follow up appreciated : to follow up with [facility]  post D/c  D/w Dr Kelley

## 2025-01-27 NOTE — PROGRESS NOTE ADULT - ASSESSMENT
HPI: Pt is a 89y old Male with hx  of fib on Eliquis, GERD, hypertension, hyperlipidemia, CVA, diabetes, rectal CA with chronic diarrhea from enemas, atonic bladder with self-catheterizations, status post permanent pacemaker for bradycardia and syncope and persistent known moderate sized pericardial effusion presenting to  ED on 1/23/25 for suprapubic pain. He has a chronic Crump that was placed in [facility] on 12/24/24. Crump was functioning well until the morning of admission when the home health aide noted bright red blood in the bag. Patient also complaining of lower abdominal pain.  Patient is on daily Eliquis.  Prior to having an indwelling Crump placed in December 2024 patient would self cath daily (6 times a day). CT abd without contrast showed bilateral pleural effusions. Pericardial effusion. Cardiomegaly .Bilateral renal cysts, some of which with rim calcification. Right renal mass. Also bladder clot, cannot exclude mass. Additional history obtained from home health aide present at bedside. She mentions he was admitted to [facility] in October 2024. There were bilateral pleural effusions. Patient was admitted to [facility] and started on IV diuretics. He then underwent pericardial window and thoracentesis. Post operative course complicated by abdominal pain and distention patient developed Indiana's syndrome. Patient was treated with the decompression colonoscopy.   Patient is reporting some suprapubic pain. He received IV Tylenol in the ED. Family is very cautious about using opiates because of his history of constipation. He requires a daily enema in the AM to have a bowel movement.   Palliative medicine Consult to further establish [facility]  1/24/25 Seen and examined at bedside. Currently denies pain or dyspnea. Able to provide some medical hx although limited    Assessment and Plan:    1) Hematuria  - UTI  -CBI in progress  -Crump cath changed  -Ct abd noted  -Urology eval   -Eliquis on hold as per med    2) Moderate to large bilateral pleural effusions   - As per CT abdomen and pelvis from admission   - Consult thoracic surg pending   - IV Lasix 40 mg QD as per med      3) Debility  -Immobility  -S/P CVA  -Poor PO intake  -Mod protein manan malnutrition  -PT eval  -Nutrition eval    4) Advanced Directives  -Pt with limited capacity  -States he has a HCP naming daughter Lilibeth  -[facility] discussion with daughter today  -Will follow    Time Spent: 45 minutes including the care, coordination and counseling of this patient, 50% of which was spent coordinating and counseling.

## 2025-01-27 NOTE — PROGRESS NOTE ADULT - SUBJECTIVE AND OBJECTIVE BOX
Date of service: 01-27-25 @ 07:17    Lying in bed in NAD  Sleepy this AM  New urine cultures reported  No fever  Reported with MDRO in urine    ROS: no fever or chills; denies dizziness, no HA, no SOB or cough, no abdominal pain, no diarrhea or constipation; no dysuria, no legs pain, no rashes    MEDICATIONS  (STANDING):  albumin human 25% IVPB 50 milliLiter(s) IV Intermittent every 12 hours  atorvastatin 80 milliGRAM(s) Oral at bedtime  ciprofloxacin     Tablet 500 milliGRAM(s) Oral every 12 hours  dextrose 5%. 1000 milliLiter(s) (50 mL/Hr) IV Continuous <Continuous>  dextrose 5%. 1000 milliLiter(s) (100 mL/Hr) IV Continuous <Continuous>  dextrose 50% Injectable 25 Gram(s) IV Push once  dextrose 50% Injectable 12.5 Gram(s) IV Push once  dextrose 50% Injectable 25 Gram(s) IV Push once  doxazosin 4 milliGRAM(s) Oral at bedtime  fluticasone propionate/ salmeterol 100-50 MICROgram(s) Diskus 1 Dose(s) Inhalation two times a day  furosemide   Injectable 40 milliGRAM(s) IV Push every 12 hours  glucagon  Injectable 1 milliGRAM(s) IntraMuscular once  insulin glargine Injectable (LANTUS) 20 Unit(s) SubCutaneous at bedtime  insulin lispro (ADMELOG) corrective regimen sliding scale   SubCutaneous three times a day before meals  insulin lispro (ADMELOG) corrective regimen sliding scale   SubCutaneous at bedtime  metoprolol succinate ER 25 milliGRAM(s) Oral daily  mineral oil enema 133 milliLiter(s) Rectal daily  sodium chloride 1 Gram(s) Oral two times a day  sulfaSALAzine 500 milliGRAM(s) Oral daily    Vital Signs Last 24 Hrs  T(C): 36.3 (27 Jan 2025 04:59), Max: 36.7 (26 Jan 2025 08:49)  T(F): 97.4 (27 Jan 2025 04:59), Max: 98 (26 Jan 2025 08:49)  HR: 78 (27 Jan 2025 04:59) (78 - 87)  BP: 115/56 (27 Jan 2025 04:59) (115/56 - 141/67)  BP(mean): --  RR: 18 (27 Jan 2025 04:59) (18 - 18)  SpO2: 99% (27 Jan 2025 04:59) (95% - 99%)    Parameters below as of 27 Jan 2025 04:59  Patient On (Oxygen Delivery Method): nasal cannula  O2 Flow (L/min): 2       Physical exam:    Constitutional:  No acute distress  HEENT: NC/AT, EOMI, PERRLA, conjunctivae clear; ears and nose atraumatic; pharynx benign  Neck: supple; thyroid not palpable  Back: no tenderness  Respiratory: respiratory effort normal; clear to auscultation  Cardiovascular: S1S2 regular, no murmurs  Abdomen: soft, not tender, not distended, positive BS; no liver or spleen organomegaly  Genitourinary: no suprapubic tenderness  Lymphatic: no LN palpable  Musculoskeletal: no muscle tenderness, no joint swelling or tenderness  Extremities: no pedal edema  Neurological/ Psychiatric: Alert, judgement and insight impaired; moving all extremities  Skin: no rashes; no palpable lesions    Labs: all available labs reviewed                        12.5   7.21  )-----------( 106      ( 25 Jan 2025 07:47 )             39.0     01-25    124[L]  |  94[L]  |  16  ----------------------------<  103[H]  4.8   |  27  |  0.86    Ca    9.6      25 Jan 2025 07:47  Phos  3.2     01-24  Mg     1.8     01-24    TPro  5.6[L]  /  Alb  2.1[L]  /  TBili  0.4  /  DBili  x   /  AST  17  /  ALT  8[L]  /  AlkPhos  71  01-25     LIVER FUNCTIONS - ( 25 Jan 2025 07:47 )  Alb: 2.1 g/dL / Pro: 5.6 gm/dL / ALK PHOS: 71 U/L / ALT: 8 U/L / AST: 17 U/L / GGT: x           Culture - Blood (collected 23 Jan 2025 15:45)  Source: .Blood BLOOD  Preliminary Report (26 Jan 2025 22:01):    No growth at 72 Hours    Culture - Blood (collected 23 Jan 2025 15:45)  Source: .Blood BLOOD  Preliminary Report (26 Jan 2025 22:01):    No growth at 72 Hours    Urinalysis with Rflx Culture (collected 23 Jan 2025 15:19)    Culture - Urine (collected 23 Jan 2025 15:19)  Source: Catheterized None  Final Report (26 Jan 2025 09:47):    50,000 - 99,000 CFU/mL Pseudomonas aeruginosa (Carbapenem Resistant)    50,000 - 99,000 CFU/mL Enterococcus faecalis  Organism: Pseudomonas aeruginosa (Carbapenem Resistant)  Pseudomonas aeruginosa (Carbapenem Resistant)  Enterococcus faecalis (26 Jan 2025 09:47)  Organism: Enterococcus faecalis (26 Jan 2025 09:47)      Method Type: AUDREY      -  Ampicillin: S <=2 Predicts results to ampicillin/sulbactam, amoxacillin-clavulanate and  piperacillin-tazobactam.      -  Ciprofloxacin: S <=1      -  Levofloxacin: S <=1      -  Nitrofurantoin: S <=32 Should not be used to treat pyelonephritis.      -  Tetracycline: R >8      -  Vancomycin: S 1  Organism: Pseudomonas aeruginosa (Carbapenem Resistant) (26 Jan 2025 09:47)      Method Type: CarbaR      -  Resistance Gene to Carbapenem: Nondet  Organism: Pseudomonas aeruginosa (Carbapenem Resistant) (26 Jan 2025 09:47)      Method Type: AUDREY      -  Amikacin: S <=16      -  Aztreonam: S 8      -  Cefepime: S 4      -  Ceftazidime: S 4      -  Ceftazidime/Avibactam: S <=4      -  Ceftolozane/tazobactam: S <=2      -  Ciprofloxacin: I 1      -  Imipenem: R 8      -  Levofloxacin: R 4      -  Meropenem: S <=1      -  Piperacillin/Tazobactam: S <=8    Radiology: all available radiological tests reviewed    < from: CT Abdomen and Pelvis No Cont (01.23.25 @ 16:23) >  Bilateral pleural effusions.  Pericardial effusion. Cardiomegaly.  Bilateral renal cysts, some of which with rim calcification. Right renal mass. Also bladder clot, cannot exclude mass. Recommend CT urogram if feasible.  Diverticulosis.  Extensive atherosclerosis.  < end of copied text >    < from: Xray Chest 1 View- PORTABLE-Urgent (Xray Chest 1 View- PORTABLE-Urgent .) (12.24.24 @ 16:00) >  1. Cardiomegaly with unipolar pacemaker lead in the right ventricle, central pulmonary venous congestion and bibasilar pleural effusions, left greater than right, where there is also suspected left lower lobe atelectasis. These findings are increased from the prior exam.  < end of copied text >    < from: CT Abdomen and Pelvis Urogram w/wo IV Cont (01.25.25 @ 10:17) >  Resolution of previously seen urinary bladder blood clot.  No hydronephrosis or suspicious urinary tract mass identified.  Unchanged large bilateral pleural effusions.  < end of copied text >      Advanced directives addressed: full resuscitation

## 2025-01-27 NOTE — PROGRESS NOTE ADULT - ASSESSMENT
HPI: Pt is a 89y old Male with hx  of fib on Eliquis, GERD, hypertension, hyperlipidemia, CVA, diabetes, rectal CA with chronic diarrhea from enemas, atonic bladder with self-catheterizations, status post permanent pacemaker for bradycardia and syncope and persistent known moderate sized pericardial effusion presenting to  ED on 1/23/25 for suprapubic pain. He has a chronic Crump that was placed in [facility] on 12/24/24. Crump was functioning well until the morning of admission when the home health aide noted bright red blood in the bag. Patient also complaining of lower abdominal pain.  Patient is on daily Eliquis.  Prior to having an indwelling Crump placed in December 2024 patient would self cath daily (6 times a day). CT abd without contrast showed bilateral pleural effusions. Pericardial effusion. Cardiomegaly .Bilateral renal cysts, some of which with rim calcification. Right renal mass. Also bladder clot, cannot exclude mass. Additional history obtained from home health aide present at bedside. She mentions he was admitted to [facility] in October 2024. There were bilateral pleural effusions. Patient was admitted to [facility] and started on IV diuretics. He then underwent pericardial window and thoracentesis. Post operative course complicated by abdominal pain and distention patient developed Indiana's syndrome. Patient was treated with the decompression colonoscopy.   Patient is reporting some suprapubic pain. He received IV Tylenol in the ED. Family is very cautious about using opiates because of his history of constipation. He requires a daily enema in the AM to have a bowel movement.   Palliative medicine Consult to further establish GOC  1/24/25 Seen and examined at bedside. Currently denies pain or dyspnea. Able to provide some medical hx although limited    Assessment and Plan:    1) Hematuria  - UTI  -CBI in progress  -Crump cath changed  -Ct abd noted  -Urology eval   -Eliquis on hold as per med    2) Moderate to large bilateral pleural effusions   - As per CT abdomen and pelvis from admission   - Consult thoracic surg pending   - IV Lasix 40 mg QD as per med      3) Debility  -Immobility  -S/P CVA  -Poor PO intake  -Mod protein manan malnutrition  -PT eval  -Nutrition eval    4) Advanced Directives  -Pt with limited capacity  -States he has a HCP naming daughter Lilibeth  -[facility] discussion with daughter today  -GOC discussion with pt today  -MOLST DNR/DNI/TrialNIV  -Will follow    Time Spent: 45 minutes including the care, coordination and counseling of this patient, 50% of which was spent coordinating and counseling.

## 2025-01-27 NOTE — PROGRESS NOTE ADULT - SUBJECTIVE AND OBJECTIVE BOX
CHIEF COMPLAINT: Patient is a 89y old  Male who presents with a chief complaint of hematuria   UTI   pleural effusions (24 Jan 2025 15:50)      HPI:  Patient is an 89 y/o M with a PMH of A-fib on Eliquis, GERD, hypertension, hyperlipidemia, CVA, diabetes, rectal CA with chronic diarrhea from enemas, atonic bladder with self-catheterizations, status post permanent pacemaker for bradycardia and syncope and persistent known moderate sized pericardial effusion presenting to  ED on 1/23/25 for suprapubic pain.     He has a chronic Crump that was placed in [facility] on 12/24/24. Crump was functioning well until the morning of admission when the home health aide noted bright red blood in the bag. Patient also complaining of lower abdominal pain.  Patient is on daily Eliquis.  No fevers.  Normal p.o. intake.  No back pain.  Denies history of kidney stones.      Prior to having an indwelling Crump placed in December 2024 patient would self cath daily (6 times a day).       Upon arrival to the ED, vitals were /78 HR 82 RR 20 T97.1F and SpO2 of 96% on room air. Labs significant for Na 125, UA with large LE, positive nitrite, TNTC WBC TNTC RBC and moderate bacteria. CT abdomen and pelvis without contrast showed bilateral pleural effusions. Pericardial effusion. Cardiomegaly.Bilateral renal cysts, some of which with rim calcification. Right renal mass. Also bladder clot, cannot exclude mass. Recommend CT urogram if feasible. He received 1L NS bolus, IV Zofran 4mg x 1, IV Tylenol 1g x 1 and IV CFTX 1g x 1. Crump replaced in the ED and large amount of small clots removed. Subsequently, Crump draining clear fluid without issues.     Additional history obtained from home health aide present at bedside. She mentions he was admitted to [facility] in October 2024. There were bilateral pleural effusions. Patient was admitted to [facility] and started on IV diuretics. He then underwent pericardial window and thoracentesis. Post operative course complicated by abdominal pain and distention patient developed Metamora's syndrome. Patient was treated with the decompression colonoscopy.     Aide mentions he appears much more fluid overloaded than his baseline. Noticeable in his bilateral lower extremities, bilateral arms and scrotum. She also mentions he is bedbound but appears more fatigued and short of breath at times. Aide says she was instructed by patient's daughter to hold off on his home Lasix dose today because his cardiologist said his Na level is low. He usually takes furosemide 20 mg BID. Last dose of furosemide was Wednesday (1/22/24) AM.     Currently, patient is reporting some suprapubic pain. He received IV Tylenol in the ED. Family is very cautious about using opiates because of his history of constipation. He requires a daily enema in the AM to have a bowel movement.  (24 Jan 2025 01:02)    Cardiology consulted for HF (unknown EF), CT with pericardial effusion and B/L pleural effusion. Pt. with mild SOB, +2 B/L LE edema, Family at bedside - says his edema is much better at present, Echo performed,     1/25/25: Pt awake with aide at bedside. Denies cp or sob.    1/26/25: Pt denies cp or sob  1/27/25: feels better     Home Medications:  albuterol 2.5 mg/3 mL (0.083%) inhalation solution: 3 milliliter(s) by nebulizer 1 to 2 times a day (24 Jan 2025 16:45)  atorvastatin 80 mg oral tablet: 1 tab(s) orally once a day (at bedtime) (24 Jan 2025 16:42)  cefpodoxime 200 mg oral tablet: 1 tab(s) orally every 12 hours ***ON HOLD while at *** (24 Jan 2025 16:39)  CeleBREX 400 mg oral capsule: 1 cap(s) orally once a day (23 Jan 2025 22:53)  chlordiazepoxide-clidinium 5 mg-2.5 mg oral capsule: 1 cap(s) orally 2 times a day (24 Jan 2025 16:42)  clonazePAM 2 mg oral tablet: 1 tab(s) orally once a day as needed for  anxiety (24 Jan 2025 16:45)  Dulera 100 mcg-5 mcg/inh inhalation aerosol: 2 puff(s) inhaled 2 times a day (24 Jan 2025 16:42)  Eliquis 2.5 mg oral tablet: 1 tab(s) orally 2 times a day ***ON HOLD while at *** (24 Jan 2025 16:45)  Lantus Solostar Pen 100 units/mL subcutaneous solution: 20 unit(s) subcutaneous once a day as needed for BG &gt; 150 , for BG &lt; 150 HOLD dose (24 Jan 2025 16:40)  Lasix 20 mg oral tablet: 1 tab(s) orally 2 times a day ***ON HOLD since Monday 1/20*** (24 Jan 2025 16:41)  Linzess 145 mcg oral capsule: 1 cap(s) orally once a day (24 Jan 2025 16:45)  metoprolol succinate 25 mg oral tablet, extended release: 1 tab(s) orally once a day (24 Jan 2025 16:45)    MEDICATIONS  (STANDING):  albumin human 25% IVPB 50 milliLiter(s) IV Intermittent every 12 hours  apixaban 2.5 milliGRAM(s) Oral two times a day  atorvastatin 80 milliGRAM(s) Oral at bedtime  chlorhexidine 4% Liquid 1 Application(s) Topical <User Schedule>  ciprofloxacin     Tablet 500 milliGRAM(s) Oral every 12 hours  dextrose 5%. 1000 milliLiter(s) (50 mL/Hr) IV Continuous <Continuous>  dextrose 5%. 1000 milliLiter(s) (100 mL/Hr) IV Continuous <Continuous>  dextrose 50% Injectable 25 Gram(s) IV Push once  dextrose 50% Injectable 12.5 Gram(s) IV Push once  dextrose 50% Injectable 25 Gram(s) IV Push once  doxazosin 4 milliGRAM(s) Oral at bedtime  fluticasone propionate/ salmeterol 100-50 MICROgram(s) Diskus 1 Dose(s) Inhalation two times a day  furosemide   Injectable 40 milliGRAM(s) IV Push every 12 hours  glucagon  Injectable 1 milliGRAM(s) IntraMuscular once  insulin glargine Injectable (LANTUS) 20 Unit(s) SubCutaneous at bedtime  insulin lispro (ADMELOG) corrective regimen sliding scale   SubCutaneous three times a day before meals  insulin lispro (ADMELOG) corrective regimen sliding scale   SubCutaneous at bedtime  metoprolol succinate ER 25 milliGRAM(s) Oral daily  mineral oil enema 133 milliLiter(s) Rectal daily  potassium chloride   Powder 40 milliEquivalent(s) Oral two times a day  sodium chloride 1 Gram(s) Oral two times a day  sulfaSALAzine 500 milliGRAM(s) Oral daily      MEDICATIONS  (PRN):  acetaminophen     Tablet .. 650 milliGRAM(s) Oral every 6 hours PRN Mild Pain (1 - 3)  acetaminophen   IVPB .. 1000 milliGRAM(s) IV Intermittent once PRN Mild Pain (1 - 3)  dextrose Oral Gel 15 Gram(s) Oral once PRN Blood Glucose LESS THAN 70 milliGRAM(s)/deciliter  melatonin 3 milliGRAM(s) Oral at bedtime PRN Insomnia  ondansetron Injectable 4 milliGRAM(s) IV Push every 6 hours PRN Nausea and/or Vomiting    Vital Signs Last 24 Hrs  T(C): 36.3 (27 Jan 2025 08:24), Max: 36.4 (26 Jan 2025 15:58)  T(F): 97.3 (27 Jan 2025 08:24), Max: 97.6 (26 Jan 2025 15:58)  HR: 80 (27 Jan 2025 08:24) (78 - 87)  BP: 123/68 (27 Jan 2025 08:24) (115/56 - 123/68)  BP(mean): --  RR: 17 (27 Jan 2025 08:24) (17 - 18)  SpO2: 100% (27 Jan 2025 08:24) (95% - 100%)    Parameters below as of 27 Jan 2025 08:24  Patient On (Oxygen Delivery Method): nasal cannula  O2 Flow (L/min): 2        PHYSICAL EXAM:  Constitutional: NAD, awake and alert  HEENT:  EOMI,  Pupils round, No oral cyanosis.  Pulmonary: Non-labored, lungs clear  Cardiovascular: S1 and S2, regular rate and rhythm, no Murmurs, gallops or rubs  Gastrointestinal: Bowel Sounds present, soft, nontender.   Lymph: 1+ B/L LE edema  Neurological: Alert, no focal deficits  Skin: No rashes.  Psych:  Mood & affect appropriate    LABS: reviewed                                    11.0   5.10  )-----------( 134      ( 27 Jan 2025 06:17 )             33.6     01-27    131[L]  |  92[L]  |  17  ----------------------------<  107[H]  3.3[L]   |  36[H]  |  0.81    Ca    9.5      27 Jan 2025 06:17  Phos  3.1     01-27  Mg     1.5     01-27    TPro  x   /  Alb  2.3[L]  /  TBili  x   /  DBili  x   /  AST  x   /  ALT  x   /  AlkPhos  x   01-27      Radiology/EKG/TTE: reviewed     < from: Xray Chest 1 View- PORTABLE-Urgent (Xray Chest 1 View- PORTABLE-Urgent .) (12.24.24 @ 16:00) >  IMPRESSION:  1. Cardiomegaly with unipolar pacemaker lead in the right ventricle,   central pulmonary venous congestion and bibasilar pleural effusions, left   greater than right, where there is also suspected left lower lobe   atelectasis. These findings are increased from the prior exam.    < end of copied text >      < from: CT Abdomen and Pelvis No Cont (01.23.25 @ 16:23) >    IMPRESSION:  Bilateral pleural effusions.    Pericardial effusion. Cardiomegaly.    Bilateral renal cysts, some of which with rim calcification. Right renal   mass. Also bladder clot, cannot exclude mass. Recommend CT urogram if   feasible.    Diverticulosis.    Extensive atherosclerosis.    < end of copied text >      < from:  Duplex Venous Upper Ext Ltd, Left (01.25.25 @ 10:46) >    IMPRESSION:  No evidence of left upper extremity deep venous thrombosis.    < end of copied text >      < from:  Duplex Venous Upper Ext Ltd, Left (01.25.25 @ 10:46) >  IMPRESSION:  No evidence of left upper extremity deep venous thrombosis.    < end of copied text >    < from: TTE W or WO Ultrasound Enhancing Agent (01.24.25 @ 14:11) >  CONCLUSIONS:      1. Mild left ventricular hypertrophy.   2. Left ventricular cavity is normal in size. Left ventricular wall thickness is mildly increased. Left ventricular systolic function isnormal with an ejection fraction visually estimated at 55 to 60 %.   3. Mildly enlarged right ventricular cavity size and normal right ventricular systolic function.   4. Left atrium is severely dilated.   5. Mild to moderate mitral regurgitation.   6. Moderate tricuspid regurgitation.   7. Estimated pulmonary artery systolic pressure is 75 mmHg, consistent with severe pulmonary hypertension.   8. The peak transaortic velocity is 2.32 m/s, peak transaortic gradient is 21.5 mmHg and mean transaortic gradient is 13.0 mmHg with an LVOT/aortic valve VTI ratio of 0.35. The effective orifice area is estimated at 1.10 cm² by the continuity equation and 1.44 cm² by 2D planimetry.   9. There is moderate calcification of the aortic valve leaflets. Moderate aortic stenosis.  10. Mild pulmonic regurgitation.  11. Moderate pericardial effusion with no echocardiographic evidence of tamponade physiology.  12. Moderate left pleural effusion noted.    ________________________________________________________________________________________    < end of copied text >

## 2025-01-27 NOTE — PROGRESS NOTE ADULT - ASSESSMENT
89 y/o M with a PMH of A-fib on Eliquis, GERD, hypertension, hyperlipidemia, CVA, diabetes, rectal CA with chronic diarrhea from enemas, atonic bladder with self-catheterizations, status post permanent pacemaker for bradycardia and syncope and persistent known moderate sized pericardial effusion presenting to  ED on 1/23/25 for suprapubic pain. He has a chronic Crump that was placed in [facility] on 12/24/24. Crump was functioning well until the morning of admission when the home health aide noted bright red blood in the bag. Patient also complaining of lower abdominal pain.    Since found to have pleural effusion on work up   recent history of note is patient had admission to [facility] in october requiring thoracentesis and pericardial centesis as per documentation with concern for tamponade at the time   since admitted in december with increased shortness of breath in ED with pleural effusion on CXR - IV diuretic given and patient sent home with outpatient follow up for which his diuretics where increased outpatient     consulted for pleural effusion also with pericardial effusion on CT scan         Problem/Recommendation - 1:  ·  Problem: Pleural effusion.   ·  Recommendation: history of pleural effusion requiring thoracentesis in the past   history of pericardial effusion   history of heart failure   would continue diuresis as tolerated   [facility] conversations ongoing with primary team   will hold off on Thoracic intervention as of now- pt stable from a respitory standpoint  If AC needed, recommend Lovenox BID incase of intervention in future   DW Dr Whalen

## 2025-01-27 NOTE — PROGRESS NOTE ADULT - SUBJECTIVE AND OBJECTIVE BOX
Subjective: Pt in bed NAD Family at bedside No respiratory distress.  b/l pleural effusions small to moderate in size.       T(C): 36.3 (01-27-25 @ 08:24), Max: 36.4 (01-26-25 @ 15:58)  HR: 80 (01-27-25 @ 08:24) (78 - 87)  BP: 123/68 (01-27-25 @ 08:24) (115/56 - 123/68)  ABP: --  ABP(mean): --  RR: 17 (01-27-25 @ 08:24) (17 - 18)  SpO2: 100% (01-27-25 @ 08:24) (95% - 100%) 2 L NC   Wt(kg): --  CVP(mm Hg): --  CO: --  CI: --  PA: --                                              Tele: Afib           01-27    131[L]  |  92[L]  |  17  ----------------------------<  107[H]  3.3[L]   |  36[H]  |  0.81    Ca    9.5      27 Jan 2025 06:17  Phos  3.1     01-27  Mg     1.5     01-27    TPro  x   /  Alb  2.3[L]  /  TBili  x   /  DBili  x   /  AST  x   /  ALT  x   /  AlkPhos  x   01-27                               11.0   5.10  )-----------( 134      ( 27 Jan 2025 06:17 )             33.6                 CAPILLARY BLOOD GLUCOSE      POCT Blood Glucose.: 89 mg/dL (27 Jan 2025 09:02)  POCT Blood Glucose.: 133 mg/dL (26 Jan 2025 20:44)  POCT Blood Glucose.: 111 mg/dL (26 Jan 2025 17:35)  POCT Blood Glucose.: 129 mg/dL (26 Jan 2025 12:47)           CXR:  < from: Xray Chest 1 View- PORTABLE-Routine (Xray Chest 1 View- PORTABLE-Routine .) (01.24.25 @ 10:56) >  IMPRESSION:    Bilateral hazy lung opacities, likely related to small bilateral layering   pleural effusions.    < end of copied text >          Exam   Neuro:  Alert Awake NAD   Pulm: decreased at bases b/l clear   CV: Irreg S12 S2   Abd:  soft   Extremities:  warm         Assessment:  89yMale    with PAST MEDICAL & SURGICAL HISTORY:  Hypertension      Cancer of rectum      RBBB (right bundle branch block)      Asthma      TIA (transient ischemic attack)      UTI (urinary tract infection)      Enlarged prostate      Urinary retention with incomplete bladder emptying      Spinal stenosis      Chronic GERD      Rectal tumor      S/P TURP            Plan:

## 2025-01-27 NOTE — PROGRESS NOTE ADULT - ASSESSMENT
89 y/o Male with h/o A-fib on Eliquis, GERD, hypertension, hyperlipidemia, old CVA, diabetes, rectal CA with chronic diarrhea, atonic bladder s/p self-catheterizations, PM for bradycardia and syncope and persistent moderate pericardial effusion, urinary retention with Robertson cath was on 1/23/25 for suprapubic pain. He has a chronic Robertson that was placed in [facility] on 12/24/24. Robertson was functioning well until the morning of admission when the home health aide noted bright red blood in the bag. Patient also complaining of lower abdominal pain. No fever or back pain. In ER the robertson was changed and large amount of small clots removed. In ER he received ceftriaxone. He was reported with new bacteriuria.     #Bacteriuria with PSAE-CRE and ENFA. Likely true pathogens since patient is symptomatic. Likely UTI   #Atonic bladder with urinary retention and Robertson catheter  #Bilateral pleural effusions.  #Pericardial effusion. Cardiomegaly.  #Bilateral renal cysts.  #Urinary bladder blood clot resolved  #Rectal Ca.   -new imaging reviewed  -cultures reviewed  -new MDRO noted in urine c/s  -organisms sensitivity reviewed  -on cefepime 2 gm IV q12h and cipro  500 mg PO q12h # 3  -tolerating abx well so far; no side effects noted  -completed cefepime  -continue abx coverage with cipro for 7 more days  -monitor temps  -f/u CBC  -supportive care  2. Other issues:   -care per medicine    d/w medicine team

## 2025-01-27 NOTE — PROGRESS NOTE ADULT - SUBJECTIVE AND OBJECTIVE BOX
HPI: Pt is a 89y old Male with hx  of fib on Eliquis, GERD, hypertension, hyperlipidemia, CVA, diabetes, rectal CA with chronic diarrhea from enemas, atonic bladder with self-catheterizations, status post permanent pacemaker for bradycardia and syncope and persistent known moderate sized pericardial effusion presenting to  ED on 1/23/25 for suprapubic pain. He has a chronic Robertson that was placed in [facility] on 12/24/24. Robertson was functioning well until the morning of admission when the home health aide noted bright red blood in the bag. Patient also complaining of lower abdominal pain.  Patient is on daily Eliquis.  Prior to having an indwelling Robertson placed in December 2024 patient would self cath daily (6 times a day). CT abd without contrast showed bilateral pleural effusions. Pericardial effusion. Cardiomegaly .Bilateral renal cysts, some of which with rim calcification. Right renal mass. Also bladder clot, cannot exclude mass. Additional history obtained from home health aide present at bedside. She mentions he was admitted to [facility] in October 2024. There were bilateral pleural effusions. Patient was admitted to [facility] and started on IV diuretics. He then underwent pericardial window and thoracentesis. Post operative course complicated by abdominal pain and distention patient developed Eldorado's syndrome. Patient was treated with the decompression colonoscopy.   Patient is reporting some suprapubic pain. He received IV Tylenol in the ED. Family is very cautious about using opiates because of his history of constipation. He requires a daily enema in the AM to have a bowel movement.   Palliative medicine Consult to further establish GOC  1/24/25 Seen and examined at bedside. Currently denies pain or dyspnea. Able to provide some medical hx although limited  1/27/25 Seen and examined at bedside. Awake and alert. Denies complaints this am  PAIN: ( )Yes   ( X)No  None currently  Level: mod abd pain on adm  Location: lower abd  Intensity:  ? /10  Aggravating Factors: unknown  Alleviating Factors: Tylenol IV  Impact on ADLs: mod    DYSPNEA: ( ) Yes  (X ) No    PAST MEDICAL & SURGICAL HISTORY:  Hypertension  Cancer of rectum  RBBB (right bundle branch block)  Asthma  TIA (transient ischemic attack)  UTI (urinary tract infection)  Enlarged prostate  Urinary retention with incomplete bladder emptying  Spinal stenosis  Chronic GERD  Rectal tumor  S/P TURP    SOCIAL HX:  Lives home with aide  Hx opiate tolerance ( )YES  ( X)NO    Baseline ADLs  (Prior to Admission)  ( ) Independent   (X )Dependent    FAMILY HISTORY:  Unable to obtain/ poor historian    Review of Systems:  Physical Discomfort-mod  Dyspnea-denies  Constipation-chronic  Anorexia-mod  Fatigue-mod  Weakness-mod    All other systems reviewed and negative  Unable to obtain/Limited due to: poor historian      PHYSICAL EXAM:  ICU Vital Signs Last 24 Hrs  T(C): 36.3 (27 Jan 2025 08:24), Max: 36.4 (26 Jan 2025 15:58)  T(F): 97.3 (27 Jan 2025 08:24), Max: 97.6 (26 Jan 2025 15:58)  HR: 80 (27 Jan 2025 08:24) (78 - 87)  BP: 123/68 (27 Jan 2025 08:24) (115/56 - 123/68)  RR: 17 (27 Jan 2025 08:24) (17 - 18)  SpO2: 100% (27 Jan 2025 08:24) (95% - 100%)    O2 Parameters below as of 27 Jan 2025 08:24  Patient On (Oxygen Delivery Method): nasal cannula  O2 Flow (L/min): 2    Daily Height in cm: 177.8 (23 Jan 2025 13:59)      PPSV2: 20 %      General: Elderly male in bed in NAD  Mental Status: A&O X3/poor recall  HEENT: oral mucosa dry  Lungs: clear to auscultation josselyn  Cardiac: S1S2+  GI: abd soft NT ND + BS  : robertson/CBI  Ext: moves all extremities   Neuro: no focal def      LABS:                                 11.0   5.10  )-----------( 134      ( 27 Jan 2025 06:17 )             33.6   01-27    131[L]  |  92[L]  |  17  ----------------------------<  107[H]  3.3[L]   |  36[H]  |  0.81    Ca    9.5      27 Jan 2025 06:17  Phos  3.1     01-27  Mg     1.5     01-27    TPro  x   /  Alb  2.3[L]  /  TBili  x   /  DBili  x   /  AST  x   /  ALT  x   /  AlkPhos  x   01-27    PTT - ( 23 Jan 2025 14:45 )  PTT:32.5 sec        Allergies    No Known Allergies    Intolerances    MEDICATIONS  (STANDING):  albumin human 25% IVPB 50 milliLiter(s) IV Intermittent every 12 hours  apixaban 2.5 milliGRAM(s) Oral two times a day  atorvastatin 80 milliGRAM(s) Oral at bedtime  chlorhexidine 4% Liquid 1 Application(s) Topical <User Schedule>  ciprofloxacin     Tablet 500 milliGRAM(s) Oral every 12 hours  dextrose 5%. 1000 milliLiter(s) (50 mL/Hr) IV Continuous <Continuous>  dextrose 5%. 1000 milliLiter(s) (100 mL/Hr) IV Continuous <Continuous>  dextrose 50% Injectable 25 Gram(s) IV Push once  dextrose 50% Injectable 12.5 Gram(s) IV Push once  dextrose 50% Injectable 25 Gram(s) IV Push once  doxazosin 4 milliGRAM(s) Oral at bedtime  fluticasone propionate/ salmeterol 100-50 MICROgram(s) Diskus 1 Dose(s) Inhalation two times a day  furosemide   Injectable 40 milliGRAM(s) IV Push every 12 hours  glucagon  Injectable 1 milliGRAM(s) IntraMuscular once  insulin glargine Injectable (LANTUS) 20 Unit(s) SubCutaneous at bedtime  insulin lispro (ADMELOG) corrective regimen sliding scale   SubCutaneous three times a day before meals  insulin lispro (ADMELOG) corrective regimen sliding scale   SubCutaneous at bedtime  metoprolol succinate ER 25 milliGRAM(s) Oral daily  mineral oil enema 133 milliLiter(s) Rectal daily  potassium chloride   Powder 40 milliEquivalent(s) Oral two times a day  sodium chloride 1 Gram(s) Oral two times a day  sulfaSALAzine 500 milliGRAM(s) Oral daily    MEDICATIONS  (PRN):  acetaminophen     Tablet .. 650 milliGRAM(s) Oral every 6 hours PRN Mild Pain (1 - 3)  acetaminophen   IVPB .. 1000 milliGRAM(s) IV Intermittent once PRN Mild Pain (1 - 3)  dextrose Oral Gel 15 Gram(s) Oral once PRN Blood Glucose LESS THAN 70 milliGRAM(s)/deciliter  melatonin 3 milliGRAM(s) Oral at bedtime PRN Insomnia  ondansetron Injectable 4 milliGRAM(s) IV Push every 6 hours PRN Nausea and/or Vomiting    RADIOLOGY/ADDITIONAL STUDIES:    < from: CT Abdomen and Pelvis Urogram w/wo IV Cont (01.25.25 @ 10:17) >    ACC: [id number] EXAM:  CT ABD PELV UROGRAM WAW IC   ORDERED BY: HODA DEJESUS     PROCEDURE DATE:  01/25/2025          INTERPRETATION:  CLINICAL INFORMATION: Bladder mass versus blood clot    ADDITIONAL CLINICAL INFORMATION: Disorder of the urinary system N39.9    COMPARISON: CT abdomen/pelvis January 23, 2025    CONTRAST/COMPLICATIONS:  IV Contrast: Omnipaque 350  90 cc administered   0 cc discarded  Oral Contrast: NONE  .    PROCEDURE:  CT of the Abdomen and Pelvis was performed.  Precontrast, Nephrographic and Excretory phases were acquired (CT   UROGRAM).  Sagittal and coronal reformats were performed.    FINDINGS:  LOWER CHEST: Cardiomegaly, coronary arterial and aortic valvular   calcifications. Cardiac device lead partially imaged. Small pericardial   effusion and large bilateral pleural effusions, not significantly changed.    LIVER: Within normal limits.  BILE DUCTS: Normal caliber.  GALLBLADDER: Within normal limits.  SPLEEN: Within normal limits.  PANCREAS: Within normal limits.  ADRENALS: Within normal limits.  KIDNEYS/URETERS: Multiple bilateral cysts. 3.4 cm left renal hemorrhagic   cyst (5; 71). No hydronephrosis or nephroureterolithiasis. No suspicious   renal or ureteral mass identified.    BLADDER: Robertson catheter balloon in the bladder lumen. No bladder mass.   Previously seen high attenuation within the bladder lumen has resolved,   likely reflecting resolved hematoma.  REPRODUCTIVE ORGANS: Prostate is enlarged.    BOWEL: Appendix not definitively identified. Diffuse mild distention of   the small bowel without evidence of obstruction. Colonic diverticulosis.  PERITONEUM/RETROPERITONEUM: Within normal limits.  VESSELS: Atherosclerotic changes.  LYMPH NODES: No lymphadenopathy.  ABDOMINAL WALL: Within normal limits.  BONES: No aggressive lesion.    IMPRESSION:  Resolution of previously seen urinary bladder blood clot.    No hydronephrosis or suspicious urinary tract mass identified.    Unchanged large bilateral pleural effusions.    Unchanged small pericardial effusion.    < end of copied text >  < from: US Duplex Venous Upper Ext Ltd, Left (01.25.25 @ 10:46) >    ACC: [id number] EXAM:  US DPLX UPR EXT VEINS LTD LT   ORDERED BY: HODA DEJESUS     PROCEDURE DATE:  01/25/2025          INTERPRETATION:  CLINICAL INFORMATION: Left arm swelling    COMPARISON: None available.    TECHNIQUE: Duplex sonography of the LEFT UPPER extremity veins with color   and spectral Doppler, with and without compression.    FINDINGS:    The left internal jugular, subclavian, axillary and brachial veins are   patent and compressible where applicable.  The basilic vein (superficial   vein) is patent and without thrombus.  The cephalic vein (superficial   vein) is patent and without thrombus.    Doppler examination shows normal spontaneous and phasic flow.    IMPRESSION:  No evidence of left upper extremity deep venous thrombosis.        < end of copied text >

## 2025-01-27 NOTE — PROGRESS NOTE ADULT - SUBJECTIVE AND OBJECTIVE BOX
HPI: Pt is a 89y old Male with hx  of fib on Eliquis, GERD, hypertension, hyperlipidemia, CVA, diabetes, rectal CA with chronic diarrhea from enemas, atonic bladder with self-catheterizations, status post permanent pacemaker for bradycardia and syncope and persistent known moderate sized pericardial effusion presenting to  ED on 1/23/25 for suprapubic pain. He has a chronic Robertson that was placed in [facility] on 12/24/24. Robertson was functioning well until the morning of admission when the home health aide noted bright red blood in the bag. Patient also complaining of lower abdominal pain.  Patient is on daily Eliquis.  Prior to having an indwelling Robertson placed in December 2024 patient would self cath daily (6 times a day). CT abd without contrast showed bilateral pleural effusions. Pericardial effusion. Cardiomegaly .Bilateral renal cysts, some of which with rim calcification. Right renal mass. Also bladder clot, cannot exclude mass. Additional history obtained from home health aide present at bedside. She mentions he was admitted to [facility] in October 2024. There were bilateral pleural effusions. Patient was admitted to [facility] and started on IV diuretics. He then underwent pericardial window and thoracentesis. Post operative course complicated by abdominal pain and distention patient developed Waterford's syndrome. Patient was treated with the decompression colonoscopy.   Patient is reporting some suprapubic pain. He received IV Tylenol in the ED. Family is very cautious about using opiates because of his history of constipation. He requires a daily enema in the AM to have a bowel movement.   Palliative medicine Consult to further establish GOC  1/24/25 Seen and examined at bedside. Currently denies pain or dyspnea. Able to provide some medical hx although limited  1/27/25 Seen and examined at bedside. Awake and alert. Denies complaints this am  PAIN: ( )Yes   ( X)No  None currently  Level: mod abd pain on adm  Location: lower abd  Intensity:  ? /10  Aggravating Factors: unknown  Alleviating Factors: Tylenol IV  Impact on ADLs: mod    DYSPNEA: ( ) Yes  (X ) No    PAST MEDICAL & SURGICAL HISTORY:  Hypertension  Cancer of rectum  RBBB (right bundle branch block)  Asthma  TIA (transient ischemic attack)  UTI (urinary tract infection)  Enlarged prostate  Urinary retention with incomplete bladder emptying  Spinal stenosis  Chronic GERD  Rectal tumor  S/P TURP    SOCIAL HX:  Lives home with aide  Hx opiate tolerance ( )YES  ( X)NO    Baseline ADLs  (Prior to Admission)  ( ) Independent   (X )Dependent    FAMILY HISTORY:  Unable to obtain/ poor historian    Review of Systems:  Physical Discomfort-mod  Dyspnea-denies  Constipation-chronic  Anorexia-mod  Fatigue-mod  Weakness-mod    All other systems reviewed and negative  Unable to obtain/Limited due to: poor historian      PHYSICAL EXAM:  ICU Vital Signs Last 24 Hrs  T(C): 36.3 (27 Jan 2025 08:24), Max: 36.4 (26 Jan 2025 15:58)  T(F): 97.3 (27 Jan 2025 08:24), Max: 97.6 (26 Jan 2025 15:58)  HR: 80 (27 Jan 2025 08:24) (78 - 87)  BP: 123/68 (27 Jan 2025 08:24) (115/56 - 123/68)  RR: 17 (27 Jan 2025 08:24) (17 - 18)  SpO2: 100% (27 Jan 2025 08:24) (95% - 100%)    O2 Parameters below as of 27 Jan 2025 08:24  Patient On (Oxygen Delivery Method): nasal cannula  O2 Flow (L/min): 2    Daily Height in cm: 177.8 (23 Jan 2025 13:59)      PPSV2: 20 %      General: Elderly male in bed in NAD  Mental Status: A&O X3/poor recall  HEENT: oral mucosa dry  Lungs: clear to auscultation josselyn  Cardiac: S1S2+  GI: abd soft NT ND + BS  : robertson/CBI  Ext: moves all extremities   Neuro: no focal def      LABS:                                 11.0   5.10  )-----------( 134      ( 27 Jan 2025 06:17 )             33.6   01-27    131[L]  |  92[L]  |  17  ----------------------------<  107[H]  3.3[L]   |  36[H]  |  0.81    Ca    9.5      27 Jan 2025 06:17  Phos  3.1     01-27  Mg     1.5     01-27    TPro  x   /  Alb  2.3[L]  /  TBili  x   /  DBili  x   /  AST  x   /  ALT  x   /  AlkPhos  x   01-27    PTT - ( 23 Jan 2025 14:45 )  PTT:32.5 sec        Allergies    No Known Allergies    Intolerances    MEDICATIONS  (STANDING):  albumin human 25% IVPB 50 milliLiter(s) IV Intermittent every 12 hours  apixaban 2.5 milliGRAM(s) Oral two times a day  atorvastatin 80 milliGRAM(s) Oral at bedtime  chlorhexidine 4% Liquid 1 Application(s) Topical <User Schedule>  ciprofloxacin     Tablet 500 milliGRAM(s) Oral every 12 hours  dextrose 5%. 1000 milliLiter(s) (50 mL/Hr) IV Continuous <Continuous>  dextrose 5%. 1000 milliLiter(s) (100 mL/Hr) IV Continuous <Continuous>  dextrose 50% Injectable 25 Gram(s) IV Push once  dextrose 50% Injectable 12.5 Gram(s) IV Push once  dextrose 50% Injectable 25 Gram(s) IV Push once  doxazosin 4 milliGRAM(s) Oral at bedtime  fluticasone propionate/ salmeterol 100-50 MICROgram(s) Diskus 1 Dose(s) Inhalation two times a day  furosemide   Injectable 40 milliGRAM(s) IV Push every 12 hours  glucagon  Injectable 1 milliGRAM(s) IntraMuscular once  insulin glargine Injectable (LANTUS) 20 Unit(s) SubCutaneous at bedtime  insulin lispro (ADMELOG) corrective regimen sliding scale   SubCutaneous three times a day before meals  insulin lispro (ADMELOG) corrective regimen sliding scale   SubCutaneous at bedtime  metoprolol succinate ER 25 milliGRAM(s) Oral daily  mineral oil enema 133 milliLiter(s) Rectal daily  potassium chloride   Powder 40 milliEquivalent(s) Oral two times a day  sodium chloride 1 Gram(s) Oral two times a day  sulfaSALAzine 500 milliGRAM(s) Oral daily    MEDICATIONS  (PRN):  acetaminophen     Tablet .. 650 milliGRAM(s) Oral every 6 hours PRN Mild Pain (1 - 3)  acetaminophen   IVPB .. 1000 milliGRAM(s) IV Intermittent once PRN Mild Pain (1 - 3)  dextrose Oral Gel 15 Gram(s) Oral once PRN Blood Glucose LESS THAN 70 milliGRAM(s)/deciliter  melatonin 3 milliGRAM(s) Oral at bedtime PRN Insomnia  ondansetron Injectable 4 milliGRAM(s) IV Push every 6 hours PRN Nausea and/or Vomiting    RADIOLOGY/ADDITIONAL STUDIES:    < from: CT Abdomen and Pelvis Urogram w/wo IV Cont (01.25.25 @ 10:17) >    ACC: [id number] EXAM:  CT ABD PELV UROGRAM WAW IC   ORDERED BY: HODA DEJESUS     PROCEDURE DATE:  01/25/2025          INTERPRETATION:  CLINICAL INFORMATION: Bladder mass versus blood clot    ADDITIONAL CLINICAL INFORMATION: Disorder of the urinary system N39.9    COMPARISON: CT abdomen/pelvis January 23, 2025    CONTRAST/COMPLICATIONS:  IV Contrast: Omnipaque 350  90 cc administered   0 cc discarded  Oral Contrast: NONE  .    PROCEDURE:  CT of the Abdomen and Pelvis was performed.  Precontrast, Nephrographic and Excretory phases were acquired (CT   UROGRAM).  Sagittal and coronal reformats were performed.    FINDINGS:  LOWER CHEST: Cardiomegaly, coronary arterial and aortic valvular   calcifications. Cardiac device lead partially imaged. Small pericardial   effusion and large bilateral pleural effusions, not significantly changed.    LIVER: Within normal limits.  BILE DUCTS: Normal caliber.  GALLBLADDER: Within normal limits.  SPLEEN: Within normal limits.  PANCREAS: Within normal limits.  ADRENALS: Within normal limits.  KIDNEYS/URETERS: Multiple bilateral cysts. 3.4 cm left renal hemorrhagic   cyst (5; 71). No hydronephrosis or nephroureterolithiasis. No suspicious   renal or ureteral mass identified.    BLADDER: Robertson catheter balloon in the bladder lumen. No bladder mass.   Previously seen high attenuation within the bladder lumen has resolved,   likely reflecting resolved hematoma.  REPRODUCTIVE ORGANS: Prostate is enlarged.    BOWEL: Appendix not definitively identified. Diffuse mild distention of   the small bowel without evidence of obstruction. Colonic diverticulosis.  PERITONEUM/RETROPERITONEUM: Within normal limits.  VESSELS: Atherosclerotic changes.  LYMPH NODES: No lymphadenopathy.  ABDOMINAL WALL: Within normal limits.  BONES: No aggressive lesion.    IMPRESSION:  Resolution of previously seen urinary bladder blood clot.    No hydronephrosis or suspicious urinary tract mass identified.    Unchanged large bilateral pleural effusions.    Unchanged small pericardial effusion.    < end of copied text >  < from: US Duplex Venous Upper Ext Ltd, Left (01.25.25 @ 10:46) >    ACC: [id number] EXAM:  US DPLX UPR EXT VEINS LTD LT   ORDERED BY: HODA DEJESUS     PROCEDURE DATE:  01/25/2025          INTERPRETATION:  CLINICAL INFORMATION: Left arm swelling    COMPARISON: None available.    TECHNIQUE: Duplex sonography of the LEFT UPPER extremity veins with color   and spectral Doppler, with and without compression.    FINDINGS:    The left internal jugular, subclavian, axillary and brachial veins are   patent and compressible where applicable.  The basilic vein (superficial   vein) is patent and without thrombus.  The cephalic vein (superficial   vein) is patent and without thrombus.    Doppler examination shows normal spontaneous and phasic flow.    IMPRESSION:  No evidence of left upper extremity deep venous thrombosis.        < end of copied text >

## 2025-01-27 NOTE — PROGRESS NOTE ADULT - PROBLEM SELECTOR PLAN 2
·  Problem: Pericardial effusion.   ·  Recommendation: pt; with recent pericardial effusion s/p pericardiocentesis on 10/24 at [place], CT shows pericardial effusion and cardiomegaly, Echo results as above

## 2025-01-28 LAB
ADD ON TEST-SPECIMEN IN LAB: SIGNIFICANT CHANGE UP
ALBUMIN SERPL ELPH-MCNC: 2.6 G/DL — LOW (ref 3.3–5)
ALP SERPL-CCNC: 53 U/L — SIGNIFICANT CHANGE UP (ref 40–120)
ALT FLD-CCNC: 12 U/L — SIGNIFICANT CHANGE UP (ref 12–78)
ANION GAP SERPL CALC-SCNC: 2 MMOL/L — LOW (ref 5–17)
ANION GAP SERPL CALC-SCNC: 4 MMOL/L — LOW (ref 5–17)
AST SERPL-CCNC: 19 U/L — SIGNIFICANT CHANGE UP (ref 15–37)
BILIRUB SERPL-MCNC: 0.6 MG/DL — SIGNIFICANT CHANGE UP (ref 0.2–1.2)
BUN SERPL-MCNC: 15 MG/DL — SIGNIFICANT CHANGE UP (ref 7–23)
BUN SERPL-MCNC: 15 MG/DL — SIGNIFICANT CHANGE UP (ref 7–23)
CALCIUM SERPL-MCNC: 9.5 MG/DL — SIGNIFICANT CHANGE UP (ref 8.5–10.1)
CALCIUM SERPL-MCNC: 9.6 MG/DL — SIGNIFICANT CHANGE UP (ref 8.5–10.1)
CHLORIDE SERPL-SCNC: 88 MMOL/L — LOW (ref 96–108)
CHLORIDE SERPL-SCNC: 90 MMOL/L — LOW (ref 96–108)
CO2 SERPL-SCNC: 40 MMOL/L — HIGH (ref 22–31)
CO2 SERPL-SCNC: 41 MMOL/L — HIGH (ref 22–31)
CREAT SERPL-MCNC: 0.72 MG/DL — SIGNIFICANT CHANGE UP (ref 0.5–1.3)
CREAT SERPL-MCNC: 0.78 MG/DL — SIGNIFICANT CHANGE UP (ref 0.5–1.3)
CULTURE RESULTS: SIGNIFICANT CHANGE UP
CULTURE RESULTS: SIGNIFICANT CHANGE UP
EGFR: 85 ML/MIN/1.73M2 — SIGNIFICANT CHANGE UP
EGFR: 87 ML/MIN/1.73M2 — SIGNIFICANT CHANGE UP
GLUCOSE BLDC GLUCOMTR-MCNC: 86 MG/DL — SIGNIFICANT CHANGE UP (ref 70–99)
GLUCOSE BLDC GLUCOMTR-MCNC: 88 MG/DL — SIGNIFICANT CHANGE UP (ref 70–99)
GLUCOSE BLDC GLUCOMTR-MCNC: 91 MG/DL — SIGNIFICANT CHANGE UP (ref 70–99)
GLUCOSE BLDC GLUCOMTR-MCNC: 99 MG/DL — SIGNIFICANT CHANGE UP (ref 70–99)
GLUCOSE SERPL-MCNC: 113 MG/DL — HIGH (ref 70–99)
GLUCOSE SERPL-MCNC: 82 MG/DL — SIGNIFICANT CHANGE UP (ref 70–99)
HCT VFR BLD CALC: 34 % — LOW (ref 39–50)
HGB BLD-MCNC: 11 G/DL — LOW (ref 13–17)
MAGNESIUM SERPL-MCNC: 1.7 MG/DL — SIGNIFICANT CHANGE UP (ref 1.6–2.6)
MAGNESIUM SERPL-MCNC: 1.8 MG/DL — SIGNIFICANT CHANGE UP (ref 1.6–2.6)
MCHC RBC-ENTMCNC: 29.5 PG — SIGNIFICANT CHANGE UP (ref 27–34)
MCHC RBC-ENTMCNC: 32.4 G/DL — SIGNIFICANT CHANGE UP (ref 32–36)
MCV RBC AUTO: 91.2 FL — SIGNIFICANT CHANGE UP (ref 80–100)
NT-PROBNP SERPL-SCNC: 2884 PG/ML — HIGH (ref 0–450)
PHOSPHATE SERPL-MCNC: 2 MG/DL — LOW (ref 2.5–4.5)
PLATELET # BLD AUTO: 129 K/UL — LOW (ref 150–400)
POTASSIUM SERPL-MCNC: 2.9 MMOL/L — CRITICAL LOW (ref 3.5–5.3)
POTASSIUM SERPL-MCNC: 3.4 MMOL/L — LOW (ref 3.5–5.3)
POTASSIUM SERPL-SCNC: 2.9 MMOL/L — CRITICAL LOW (ref 3.5–5.3)
POTASSIUM SERPL-SCNC: 3.4 MMOL/L — LOW (ref 3.5–5.3)
PROT SERPL-MCNC: 5.6 GM/DL — LOW (ref 6–8.3)
RBC # BLD: 3.73 M/UL — LOW (ref 4.2–5.8)
RBC # FLD: 15.1 % — HIGH (ref 10.3–14.5)
SODIUM SERPL-SCNC: 131 MMOL/L — LOW (ref 135–145)
SODIUM SERPL-SCNC: 134 MMOL/L — LOW (ref 135–145)
SPECIMEN SOURCE: SIGNIFICANT CHANGE UP
SPECIMEN SOURCE: SIGNIFICANT CHANGE UP
WBC # BLD: 4.17 K/UL — SIGNIFICANT CHANGE UP (ref 3.8–10.5)
WBC # FLD AUTO: 4.17 K/UL — SIGNIFICANT CHANGE UP (ref 3.8–10.5)

## 2025-01-28 PROCEDURE — 99231 SBSQ HOSP IP/OBS SF/LOW 25: CPT

## 2025-01-28 PROCEDURE — 99233 SBSQ HOSP IP/OBS HIGH 50: CPT

## 2025-01-28 PROCEDURE — 71045 X-RAY EXAM CHEST 1 VIEW: CPT | Mod: 26

## 2025-01-28 PROCEDURE — 99232 SBSQ HOSP IP/OBS MODERATE 35: CPT

## 2025-01-28 PROCEDURE — 99233 SBSQ HOSP IP/OBS HIGH 50: CPT | Mod: FS

## 2025-01-28 RX ORDER — ENOXAPARIN SODIUM 100 MG/ML
70 INJECTION SUBCUTANEOUS EVERY 12 HOURS
Refills: 0 | Status: DISCONTINUED | OUTPATIENT
Start: 2025-01-28 | End: 2025-01-29

## 2025-01-28 RX ORDER — POTASSIUM CHLORIDE 750 MG/1
40 TABLET, EXTENDED RELEASE ORAL EVERY 4 HOURS
Refills: 0 | Status: COMPLETED | OUTPATIENT
Start: 2025-01-28 | End: 2025-01-28

## 2025-01-28 RX ORDER — MAGNESIUM SULFATE 0.8 MEQ/ML
1 AMPUL (ML) INJECTION ONCE
Refills: 0 | Status: COMPLETED | OUTPATIENT
Start: 2025-01-28 | End: 2025-01-28

## 2025-01-28 RX ADMIN — Medication 100 GRAM(S): at 17:17

## 2025-01-28 RX ADMIN — Medication 40 MILLIGRAM(S): at 18:43

## 2025-01-28 RX ADMIN — POTASSIUM CHLORIDE 40 MILLIEQUIVALENT(S): 750 TABLET, EXTENDED RELEASE ORAL at 10:57

## 2025-01-28 RX ADMIN — Medication 1 GRAM(S): at 10:58

## 2025-01-28 RX ADMIN — ATORVASTATIN CALCIUM 80 MILLIGRAM(S): 80 TABLET, FILM COATED ORAL at 21:51

## 2025-01-28 RX ADMIN — Medication 40 MILLIGRAM(S): at 05:01

## 2025-01-28 RX ADMIN — Medication 4 MILLIGRAM(S): at 21:51

## 2025-01-28 RX ADMIN — Medication 1 GRAM(S): at 21:52

## 2025-01-28 RX ADMIN — POTASSIUM CHLORIDE 40 MILLIEQUIVALENT(S): 750 TABLET, EXTENDED RELEASE ORAL at 15:10

## 2025-01-28 RX ADMIN — ALBUMIN HUMAN 50 MILLILITER(S): 50 SOLUTION INTRAVENOUS at 18:43

## 2025-01-28 RX ADMIN — FLUTICASONE PROPIONATE AND SALMETEROL 1 DOSE(S): 113; 14 POWDER, METERED RESPIRATORY (INHALATION) at 20:41

## 2025-01-28 RX ADMIN — Medication 25 MILLIGRAM(S): at 10:58

## 2025-01-28 RX ADMIN — FLUTICASONE PROPIONATE AND SALMETEROL 1 DOSE(S): 113; 14 POWDER, METERED RESPIRATORY (INHALATION) at 08:22

## 2025-01-28 RX ADMIN — ENOXAPARIN SODIUM 70 MILLIGRAM(S): 100 INJECTION SUBCUTANEOUS at 12:54

## 2025-01-28 RX ADMIN — Medication 500 MILLIGRAM(S): at 10:58

## 2025-01-28 RX ADMIN — ACETAMINOPHEN, DIPHENHYDRAMINE HCL, PHENYLEPHRINE HCL 3 MILLIGRAM(S): 325; 25; 5 TABLET ORAL at 22:01

## 2025-01-28 RX ADMIN — ALBUMIN HUMAN 50 MILLILITER(S): 50 SOLUTION INTRAVENOUS at 05:00

## 2025-01-28 RX ADMIN — Medication 500 MILLIGRAM(S): at 21:52

## 2025-01-28 RX ADMIN — ANTISEPTIC SURGICAL SCRUB 1 APPLICATION(S): 0.04 SOLUTION TOPICAL at 07:44

## 2025-01-28 RX ADMIN — ENOXAPARIN SODIUM 70 MILLIGRAM(S): 100 INJECTION SUBCUTANEOUS at 21:52

## 2025-01-28 NOTE — PROGRESS NOTE ADULT - ASSESSMENT
89 y/o M with a PMH of A-fib on Eliquis, GERD, hypertension, hyperlipidemia, CVA, diabetes, rectal CA with chronic diarrhea from enemas, atonic bladder with self-catheterizations, status post permanent pacemaker for bradycardia and syncope and persistent known moderate sized pericardial effusion presenting to  ED on 1/23/25 for suprapubic pain.   noted with hyponatremia likely depletional  b/l pleural effusion   suprapubic pain with hematuria     REC  - urine osm, serum osm   - serum electrolytes  - with dec of na with lasix 40 iv started, will hold dose and fu the serum and urine  studies send   - CTS input noted: intervention planned,   - no need for FR, pt consuming minimal po intake  - inc intake encourage, refuses any  nutritional supplements   - CBI in progress   - on ctx fu ucx    1/25 SY  --Hyponatremia in the setting of CHF decompensation and anasarca      Will need to continue diuresis with an attempt to stabilize serum sodium.     Add SPA for more effective diuresis     Restart NACL tabs 1 gm BID.  --UTI/hematuria : CBI per ,  Continue Ceftriaxone 1 gm daily.  --Bladder : clot vs mass :  follow up.  D/w Dr Kelley    D/w family.    1/26 SY  --Hyponatremia in the setting of CHF decompensation and anasarca      Responding well to SPA/IV Lasix and salt tabs     Continue for another 2-3 days to optimize fluid and electrolytes prior to changing to home maintenance PO meds.  --UTI/hematuria : cleared, off CBI x 2 days. Abtx changed to po Cipro 500mg q 12 h.  --Bladder : clot vs mass :  follow up.  D/w Dr Kelley    1/27 SY  --Hyponatremia in the setting of CHF decompensation and anasarca      Continues to diurese well with SPA/IV Lasix and salt tabs     Continue for another 2-3 days to optimize fluid and electrolytes prior to changing to home maintenance PO meds.  --UTI/hematuria : cleared, off CBI x 3 days. Abtx changed to po Cipro 500mg q 12 h.  --Bladder : clot vs mass :  follow up appreciated : to follow up with [facility]  post D/c  D/w Dr Kelley  89 y/o M with a PMH of A-fib on Eliquis, GERD, hypertension, hyperlipidemia, CVA, diabetes, rectal CA with chronic diarrhea from enemas, atonic bladder with self-catheterizations, status post permanent pacemaker for bradycardia and syncope and persistent known moderate sized pericardial effusion presenting to  ED on 1/23/25 for suprapubic pain.   noted with hyponatremia likely depletional  b/l pleural effusion   suprapubic pain with hematuria     REC  - urine osm, serum osm   - serum electrolytes  - with dec of na with lasix 40 iv started, will hold dose and fu the serum and urine  studies send   - CTS input noted: intervention planned,   - no need for FR, pt consuming minimal po intake  - inc intake encourage, refuses any  nutritional supplements   - CBI in progress   - on ctx fu ucx    1/25 SY  --Hyponatremia in the setting of CHF decompensation and anasarca      Will need to continue diuresis with an attempt to stabilize serum sodium.     Add SPA for more effective diuresis     Restart NACL tabs 1 gm BID.  --UTI/hematuria : CBI per ,  Continue Ceftriaxone 1 gm daily.  --Bladder : clot vs mass :  follow up.  D/w Dr Kelley    D/w family.    1/26 SY  --Hyponatremia in the setting of CHF decompensation and anasarca      Responding well to SPA/IV Lasix and salt tabs     Continue for another 2-3 days to optimize fluid and electrolytes prior to changing to home maintenance PO meds.  --UTI/hematuria : cleared, off CBI x 2 days. Abtx changed to po Cipro 500mg q 12 h.  --Bladder : clot vs mass :  follow up.  D/w Dr Kelley    1/27 SY  --Hyponatremia in the setting of CHF decompensation and anasarca      Continues to diurese well with SPA/IV Lasix and salt tabs     Continue for another 2-3 days to optimize fluid and electrolytes prior to changing to home maintenance PO meds.  --UTI/hematuria : cleared, off CBI x 3 days. Abtx changed to po Cipro 500mg q 12 h.  --Bladder : clot vs mass :  follow up appreciated : to follow up with [facility] [place]  post D/c  D/w Dr Kelley    1/28 MK   - hyponatremia hypervolumia     chf decomp with anasarca     tolerating spa and lasix with nacl tabs     significant response with current diuretics     monitor for worsening contraction alkalosis     cw nacl tab    aide advised  to continue with inc supplements   - UTI/hematuria     s/p cbi     cipro   - bladder mass vs clot as above

## 2025-01-28 NOTE — PROGRESS NOTE ADULT - SUBJECTIVE AND OBJECTIVE BOX
Subjective:  Pt seen w aide at bedside. No complaints. Denies SOB. Seen on 2L.    Vital Signs:  Vital Signs Last 24 Hrs  T(C): 36.7 (01-28-25 @ 09:50), Max: 36.7 (01-28-25 @ 09:50)  T(F): 98 (01-28-25 @ 09:50), Max: 98 (01-28-25 @ 09:50)  HR: 84 (01-28-25 @ 09:50) (70 - 84)  BP: 125/52 (01-28-25 @ 09:50) (110/51 - 138/64)  RR: 18 (01-28-25 @ 09:50) (17 - 18)  SpO2: 99% (01-28-25 @ 09:50) (96% - 100%) on (O2)    Telemetry/Alarms:    Relevant labs, radiology and Medications reviewed                        11.0   4.17  )-----------( 129      ( 28 Jan 2025 08:08 )             34.0     01-28    131[L]  |  88[L]  |  15  ----------------------------<  82  2.9[LL]   |  41[H]  |  0.78    Ca    9.5      28 Jan 2025 08:08  Phos  3.1     01-27  Mg     1.7     01-28    TPro  5.6[L]  /  Alb  2.6[L]  /  TBili  0.6  /  DBili  x   /  AST  19  /  ALT  12  /  AlkPhos  53  01-28      MEDICATIONS  (STANDING):  albumin human 25% IVPB 50 milliLiter(s) IV Intermittent every 12 hours  atorvastatin 80 milliGRAM(s) Oral at bedtime  chlorhexidine 4% Liquid 1 Application(s) Topical <User Schedule>  ciprofloxacin     Tablet 500 milliGRAM(s) Oral every 12 hours  dextrose 5%. 1000 milliLiter(s) (100 mL/Hr) IV Continuous <Continuous>  dextrose 5%. 1000 milliLiter(s) (50 mL/Hr) IV Continuous <Continuous>  dextrose 50% Injectable 25 Gram(s) IV Push once  dextrose 50% Injectable 12.5 Gram(s) IV Push once  dextrose 50% Injectable 25 Gram(s) IV Push once  doxazosin 4 milliGRAM(s) Oral at bedtime  enoxaparin Injectable 80 milliGRAM(s) SubCutaneous every 12 hours  fluticasone propionate/ salmeterol 100-50 MICROgram(s) Diskus 1 Dose(s) Inhalation two times a day  furosemide   Injectable 40 milliGRAM(s) IV Push every 12 hours  glucagon  Injectable 1 milliGRAM(s) IntraMuscular once  insulin glargine Injectable (LANTUS) 20 Unit(s) SubCutaneous at bedtime  insulin lispro (ADMELOG) corrective regimen sliding scale   SubCutaneous three times a day before meals  insulin lispro (ADMELOG) corrective regimen sliding scale   SubCutaneous at bedtime  metoprolol succinate ER 25 milliGRAM(s) Oral daily  mineral oil enema 133 milliLiter(s) Rectal daily  potassium chloride   Powder 40 milliEquivalent(s) Oral every 4 hours  sodium chloride 1 Gram(s) Oral two times a day  sulfaSALAzine 500 milliGRAM(s) Oral daily    MEDICATIONS  (PRN):  acetaminophen     Tablet .. 650 milliGRAM(s) Oral every 6 hours PRN Mild Pain (1 - 3)  acetaminophen   IVPB .. 1000 milliGRAM(s) IV Intermittent once PRN Mild Pain (1 - 3)  dextrose Oral Gel 15 Gram(s) Oral once PRN Blood Glucose LESS THAN 70 milliGRAM(s)/deciliter  melatonin 3 milliGRAM(s) Oral at bedtime PRN Insomnia  ondansetron Injectable 4 milliGRAM(s) IV Push every 6 hours PRN Nausea and/or Vomiting      Physical exam  Gen NAD  Neuro AAOx3, [place]  Card RRR  Pulm decreased b/l bases  Abd soft  Ext warm, edema    Tubes:    I&O's Summary    27 Jan 2025 07:01  -  28 Jan 2025 07:00  --------------------------------------------------------  IN: 0 mL / OUT: 4250 mL / NET: -4250 mL    28 Jan 2025 07:01  -  28 Jan 2025 11:13  --------------------------------------------------------  IN: 0 mL / OUT: 1500 mL / NET: -1500 mL        Assessment  89y Male  w/ PAST MEDICAL & SURGICAL HISTORY:  Hypertension      Cancer of rectum      RBBB (right bundle branch block)      Asthma      TIA (transient ischemic attack)      UTI (urinary tract infection)      Enlarged prostate      Urinary retention with incomplete bladder emptying      Spinal stenosis      Chronic GERD      Rectal tumor      S/P TURP      admitted with complaints of Patient is a 89y old  Male who presents with a chief complaint of hematuria   UTI   pleural effusions (28 Jan 2025 10:54)  .  89 y/o M with a PMH of A-fib on Eliquis, GERD, hypertension, hyperlipidemia, CVA, diabetes, rectal CA with chronic diarrhea from enemas, atonic bladder with self-catheterizations, status post permanent pacemaker for bradycardia and syncope and persistent known moderate sized pericardial effusion presenting to  ED on 1/23/25 for suprapubic pain. He has a chronic Crump that was placed in [facility] on 12/24/24. Crump was functioning well until the morning of admission when the home health aide noted bright red blood in the bag. Patient also complaining of lower abdominal pain.    Since found to have pleural effusion on work up   recent history of note is patient had admission to [facility] in october requiring thoracentesis and pericardial centesis as per documentation with concern for tamponade at the time   since admitted in december with increased shortness of breath in ED with pleural effusion on CXR - IV diuretic given and patient sent home with outpatient follow up for which his diuretics where increased outpatient     consulted for pleural effusion also with pericardial effusion on CT scan   continue diuresis as tolerated   will hold off on Thoracic intervention   stable from a respiratory standpoint  care as per medical teams  d/w pt and aide, in agreement  IS and wean O2 as able    Discussed with Cardiothoracic Team at AM rounds.

## 2025-01-28 NOTE — PROGRESS NOTE ADULT - SUBJECTIVE AND OBJECTIVE BOX
CHIEF COMPLAINT: Patient is a 89y old  Male who presents with a chief complaint of hematuria   UTI   pleural effusions (24 Jan 2025 15:50)      HPI:  Patient is an 89 y/o M with a PMH of A-fib on Eliquis, GERD, hypertension, hyperlipidemia, CVA, diabetes, rectal CA with chronic diarrhea from enemas, atonic bladder with self-catheterizations, status post permanent pacemaker for bradycardia and syncope and persistent known moderate sized pericardial effusion presenting to  ED on 1/23/25 for suprapubic pain.     He has a chronic Crump that was placed in [facility] on 12/24/24. Crump was functioning well until the morning of admission when the home health aide noted bright red blood in the bag. Patient also complaining of lower abdominal pain.  Patient is on daily Eliquis.  No fevers.  Normal p.o. intake.  No back pain.  Denies history of kidney stones.      Prior to having an indwelling Crump placed in December 2024 patient would self cath daily (6 times a day).       Upon arrival to the ED, vitals were /78 HR 82 RR 20 T97.1F and SpO2 of 96% on room air. Labs significant for Na 125, UA with large LE, positive nitrite, TNTC WBC TNTC RBC and moderate bacteria. CT abdomen and pelvis without contrast showed bilateral pleural effusions. Pericardial effusion. Cardiomegaly.Bilateral renal cysts, some of which with rim calcification. Right renal mass. Also bladder clot, cannot exclude mass. Recommend CT urogram if feasible. He received 1L NS bolus, IV Zofran 4mg x 1, IV Tylenol 1g x 1 and IV CFTX 1g x 1. Crump replaced in the ED and large amount of small clots removed. Subsequently, Crump draining clear fluid without issues.     Additional history obtained from home health aide present at bedside. She mentions he was admitted to [place] in October 2024. There were bilateral pleural effusions. Patient was admitted to [facility] and started on IV diuretics. He then underwent pericardial window and thoracentesis. Post operative course complicated by abdominal pain and distention patient developed Milton's syndrome. Patient was treated with the decompression colonoscopy.     Aide mentions he appears much more fluid overloaded than his baseline. Noticeable in his bilateral lower extremities, bilateral arms and scrotum. She also mentions he is bedbound but appears more fatigued and short of breath at times. Aide says she was instructed by patient's daughter to hold off on his home Lasix dose today because his cardiologist said his Na level is low. He usually takes furosemide 20 mg BID. Last dose of furosemide was Wednesday (1/22/24) AM.     Currently, patient is reporting some suprapubic pain. He received IV Tylenol in the ED. Family is very cautious about using opiates because of his history of constipation. He requires a daily enema in the AM to have a bowel movement.  (24 Jan 2025 01:02)    Cardiology consulted for HF (unknown EF), CT with pericardial effusion and B/L pleural effusion. Pt. with mild SOB, +2 B/L LE edema, Family at bedside - says his edema is much better at present, Echo performed,     1/25/25: Pt awake with aide at bedside. Denies cp or sob.    1/26/25: Pt denies cp or sob  1/27/25: feels better   1/28/25:     Home Medications:  albuterol 2.5 mg/3 mL (0.083%) inhalation solution: 3 milliliter(s) by nebulizer 1 to 2 times a day (24 Jan 2025 16:45)  atorvastatin 80 mg oral tablet: 1 tab(s) orally once a day (at bedtime) (24 Jan 2025 16:42)  cefpodoxime 200 mg oral tablet: 1 tab(s) orally every 12 hours ***ON HOLD while at *** (24 Jan 2025 16:39)  CeleBREX 400 mg oral capsule: 1 cap(s) orally once a day (23 Jan 2025 22:53)  chlordiazepoxide-clidinium 5 mg-2.5 mg oral capsule: 1 cap(s) orally 2 times a day (24 Jan 2025 16:42)  clonazePAM 2 mg oral tablet: 1 tab(s) orally once a day as needed for  anxiety (24 Jan 2025 16:45)  Dulera 100 mcg-5 mcg/inh inhalation aerosol: 2 puff(s) inhaled 2 times a day (24 Jan 2025 16:42)  Eliquis 2.5 mg oral tablet: 1 tab(s) orally 2 times a day ***ON HOLD while at *** (24 Jan 2025 16:45)  Lantus Solostar Pen 100 units/mL subcutaneous solution: 20 unit(s) subcutaneous once a day as needed for BG &gt; 150 , for BG &lt; 150 HOLD dose (24 Jan 2025 16:40)  Lasix 20 mg oral tablet: 1 tab(s) orally 2 times a day ***ON HOLD since Monday 1/20*** (24 Jan 2025 16:41)  Linzess 145 mcg oral capsule: 1 cap(s) orally once a day (24 Jan 2025 16:45)  metoprolol succinate 25 mg oral tablet, extended release: 1 tab(s) orally once a day (24 Jan 2025 16:45)    MEDICATIONS  (STANDING):  albumin human 25% IVPB 50 milliLiter(s) IV Intermittent every 12 hours  atorvastatin 80 milliGRAM(s) Oral at bedtime  chlorhexidine 4% Liquid 1 Application(s) Topical <User Schedule>  ciprofloxacin     Tablet 500 milliGRAM(s) Oral every 12 hours  dextrose 5%. 1000 milliLiter(s) (50 mL/Hr) IV Continuous <Continuous>  dextrose 5%. 1000 milliLiter(s) (100 mL/Hr) IV Continuous <Continuous>  dextrose 50% Injectable 25 Gram(s) IV Push once  dextrose 50% Injectable 12.5 Gram(s) IV Push once  dextrose 50% Injectable 25 Gram(s) IV Push once  doxazosin 4 milliGRAM(s) Oral at bedtime  enoxaparin Injectable 80 milliGRAM(s) SubCutaneous every 12 hours  fluticasone propionate/ salmeterol 100-50 MICROgram(s) Diskus 1 Dose(s) Inhalation two times a day  furosemide   Injectable 40 milliGRAM(s) IV Push every 12 hours  glucagon  Injectable 1 milliGRAM(s) IntraMuscular once  insulin glargine Injectable (LANTUS) 20 Unit(s) SubCutaneous at bedtime  insulin lispro (ADMELOG) corrective regimen sliding scale   SubCutaneous three times a day before meals  insulin lispro (ADMELOG) corrective regimen sliding scale   SubCutaneous at bedtime  metoprolol succinate ER 25 milliGRAM(s) Oral daily  mineral oil enema 133 milliLiter(s) Rectal daily  potassium chloride   Powder 40 milliEquivalent(s) Oral every 4 hours  sodium chloride 1 Gram(s) Oral two times a day  sulfaSALAzine 500 milliGRAM(s) Oral daily    MEDICATIONS  (PRN):  acetaminophen     Tablet .. 650 milliGRAM(s) Oral every 6 hours PRN Mild Pain (1 - 3)  acetaminophen   IVPB .. 1000 milliGRAM(s) IV Intermittent once PRN Mild Pain (1 - 3)  dextrose Oral Gel 15 Gram(s) Oral once PRN Blood Glucose LESS THAN 70 milliGRAM(s)/deciliter  melatonin 3 milliGRAM(s) Oral at bedtime PRN Insomnia  ondansetron Injectable 4 milliGRAM(s) IV Push every 6 hours PRN Nausea and/or Vomiting    Vital Signs Last 24 Hrs  T(C): 36.7 (28 Jan 2025 09:50), Max: 36.7 (28 Jan 2025 09:50)  T(F): 98 (28 Jan 2025 09:50), Max: 98 (28 Jan 2025 09:50)  HR: 84 (28 Jan 2025 09:50) (70 - 84)  BP: 125/52 (28 Jan 2025 09:50) (110/51 - 138/64)  BP(mean): 84 (28 Jan 2025 09:50) (66 - 84)  RR: 18 (28 Jan 2025 09:50) (17 - 18)  SpO2: 99% (28 Jan 2025 09:50) (96% - 100%)    Parameters below as of 28 Jan 2025 08:25  Patient On (Oxygen Delivery Method): nasal cannula      PHYSICAL EXAM:  Constitutional: NAD, awake and alert  HEENT:  EOMI,  Pupils round, No oral cyanosis.  Pulmonary: Non-labored, lungs clear  Cardiovascular: S1 and S2, regular rate and rhythm, no Murmurs, gallops or rubs  Gastrointestinal: Bowel Sounds present, soft, nontender.   Lymph: 1+ B/L LE edema  Neurological: Alert, no focal deficits  Skin: No rashes.  Psych:  Mood & affect appropriate    LABS: reviewed                           11.0   4.17  )-----------( 129      ( 28 Jan 2025 08:08 )             34.0                                      11.0   5.10  )-----------( 134      ( 27 Jan 2025 06:17 )             33.6     01-28    131[L]  |  88[L]  |  15  ----------------------------<  82  2.9[LL]   |  41[H]  |  0.78    Ca    9.5      28 Jan 2025 08:08  Phos  3.1     01-27  Mg     1.7     01-28    TPro  5.6[L]  /  Alb  2.6[L]  /  TBili  0.6  /  DBili  x   /  AST  19  /  ALT  12  /  AlkPhos  53  01-28 01-27    131[L]  |  92[L]  |  17  ----------------------------<  107[H]  3.3[L]   |  36[H]  |  0.81    Ca    9.5      27 Jan 2025 06:17  Phos  3.1     01-27  Mg     1.5     01-27    TPro  x   /  Alb  2.3[L]  /  TBili  x   /  DBili  x   /  AST  x   /  ALT  x   /  AlkPhos  x   01-27      Radiology/EKG/TTE: reviewed     < from: Xray Chest 1 View- PORTABLE-Urgent (Xray Chest 1 View- PORTABLE-Urgent .) (12.24.24 @ 16:00) >  IMPRESSION:  1. Cardiomegaly with unipolar pacemaker lead in the right ventricle,   central pulmonary venous congestion and bibasilar pleural effusions, left   greater than right, where there is also suspected left lower lobe   atelectasis. These findings are increased from the prior exam.    < end of copied text >      < from: CT Abdomen and Pelvis No Cont (01.23.25 @ 16:23) >    IMPRESSION:  Bilateral pleural effusions.    Pericardial effusion. Cardiomegaly.    Bilateral renal cysts, some of which with rim calcification. Right renal   mass. Also bladder clot, cannot exclude mass. Recommend CT urogram if   feasible.    Diverticulosis.    Extensive atherosclerosis.    < end of copied text >      < from: US Duplex Venous Upper Ext Ltd, Left (01.25.25 @ 10:46) >    IMPRESSION:  No evidence of left upper extremity deep venous thrombosis.    < end of copied text >      < from: US Duplex Venous Upper Ext Ltd, Left (01.25.25 @ 10:46) >  IMPRESSION:  No evidence of left upper extremity deep venous thrombosis.    < end of copied text >    < from: TTE W or WO Ultrasound Enhancing Agent (01.24.25 @ 14:11) >  CONCLUSIONS:      1. Mild left ventricular hypertrophy.   2. Left ventricular cavity is normal in size. Left ventricular wall thickness is mildly increased. Left ventricular systolic function isnormal with an ejection fraction visually estimated at 55 to 60 %.   3. Mildly enlarged right ventricular cavity size and normal right ventricular systolic function.   4. Left atrium is severely dilated.   5. Mild to moderate mitral regurgitation.   6. Moderate tricuspid regurgitation.   7. Estimated pulmonary artery systolic pressure is 75 mmHg, consistent with severe pulmonary hypertension.   8. The peak transaortic velocity is 2.32 m/s, peak transaortic gradient is 21.5 mmHg and mean transaortic gradient is 13.0 mmHg with an LVOT/aortic valve VTI ratio of 0.35. The effective orifice area is estimated at 1.10 cm² by the continuity equation and 1.44 cm² by 2D planimetry.   9. There is moderate calcification of the aortic valve leaflets. Moderate aortic stenosis.  10. Mild pulmonic regurgitation.  11. Moderate pericardial effusion with no echocardiographic evidence of tamponade physiology.  12. Moderate left pleural effusion noted.    ________________________________________________________________________________________    < end of copied text >     CHIEF COMPLAINT: Patient is a 89y old  Male who presents with a chief complaint of hematuria   UTI   pleural effusions (24 Jan 2025 15:50)      HPI:  Patient is an 89 y/o M with a PMH of A-fib on Eliquis, GERD, hypertension, hyperlipidemia, CVA, diabetes, rectal CA with chronic diarrhea from enemas, atonic bladder with self-catheterizations, status post permanent pacemaker for bradycardia and syncope and persistent known moderate sized pericardial effusion presenting to  ED on 1/23/25 for suprapubic pain.     He has a chronic Crump that was placed in [facility] on 12/24/24. Crump was functioning well until the morning of admission when the home health aide noted bright red blood in the bag. Patient also complaining of lower abdominal pain.  Patient is on daily Eliquis.  No fevers.  Normal p.o. intake.  No back pain.  Denies history of kidney stones.      Prior to having an indwelling Crump placed in December 2024 patient would self cath daily (6 times a day).       Upon arrival to the ED, vitals were /78 HR 82 RR 20 T97.1F and SpO2 of 96% on room air. Labs significant for Na 125, UA with large LE, positive nitrite, TNTC WBC TNTC RBC and moderate bacteria. CT abdomen and pelvis without contrast showed bilateral pleural effusions. Pericardial effusion. Cardiomegaly.Bilateral renal cysts, some of which with rim calcification. Right renal mass. Also bladder clot, cannot exclude mass. Recommend CT urogram if feasible. He received 1L NS bolus, IV Zofran 4mg x 1, IV Tylenol 1g x 1 and IV CFTX 1g x 1. Crump replaced in the ED and large amount of small clots removed. Subsequently, Crump draining clear fluid without issues.     Additional history obtained from home health aide present at bedside. She mentions he was admitted to [facility] in October 2024. There were bilateral pleural effusions. Patient was admitted to [facility] and started on IV diuretics. He then underwent pericardial window and thoracentesis. Post operative course complicated by abdominal pain and distention patient developed Skaneateles's syndrome. Patient was treated with the decompression colonoscopy.     Aide mentions he appears much more fluid overloaded than his baseline. Noticeable in his bilateral lower extremities, bilateral arms and scrotum. She also mentions he is bedbound but appears more fatigued and short of breath at times. Aide says she was instructed by patient's daughter to hold off on his home Lasix dose today because his cardiologist said his Na level is low. He usually takes furosemide 20 mg BID. Last dose of furosemide was Wednesday (1/22/24) AM.     Currently, patient is reporting some suprapubic pain. He received IV Tylenol in the ED. Family is very cautious about using opiates because of his history of constipation. He requires a daily enema in the AM to have a bowel movement.  (24 Jan 2025 01:02)    Cardiology consulted for HF (unknown EF), CT with pericardial effusion and B/L pleural effusion. Pt. with mild SOB, +2 B/L LE edema, Family at bedside - says his edema is much better at present, Echo performed,     1/25/25: Pt awake with aide at bedside. Denies cp or sob.    1/26/25: Pt denies cp or sob  1/27/25: feels better   1/28/25: Pt without cardiac complaints.    Home Medications:  albuterol 2.5 mg/3 mL (0.083%) inhalation solution: 3 milliliter(s) by nebulizer 1 to 2 times a day (24 Jan 2025 16:45)  atorvastatin 80 mg oral tablet: 1 tab(s) orally once a day (at bedtime) (24 Jan 2025 16:42)  cefpodoxime 200 mg oral tablet: 1 tab(s) orally every 12 hours ***ON HOLD while at *** (24 Jan 2025 16:39)  CeleBREX 400 mg oral capsule: 1 cap(s) orally once a day (23 Jan 2025 22:53)  chlordiazepoxide-clidinium 5 mg-2.5 mg oral capsule: 1 cap(s) orally 2 times a day (24 Jan 2025 16:42)  clonazePAM 2 mg oral tablet: 1 tab(s) orally once a day as needed for  anxiety (24 Jan 2025 16:45)  Dulera 100 mcg-5 mcg/inh inhalation aerosol: 2 puff(s) inhaled 2 times a day (24 Jan 2025 16:42)  Eliquis 2.5 mg oral tablet: 1 tab(s) orally 2 times a day ***ON HOLD while at HH*** (24 Jan 2025 16:45)  Lantus Solostar Pen 100 units/mL subcutaneous solution: 20 unit(s) subcutaneous once a day as needed for BG &gt; 150 , for BG &lt; 150 HOLD dose (24 Jan 2025 16:40)  Lasix 20 mg oral tablet: 1 tab(s) orally 2 times a day ***ON HOLD since Monday 1/20*** (24 Jan 2025 16:41)  Linzess 145 mcg oral capsule: 1 cap(s) orally once a day (24 Jan 2025 16:45)  metoprolol succinate 25 mg oral tablet, extended release: 1 tab(s) orally once a day (24 Jan 2025 16:45)    MEDICATIONS  (STANDING):  albumin human 25% IVPB 50 milliLiter(s) IV Intermittent every 12 hours  atorvastatin 80 milliGRAM(s) Oral at bedtime  chlorhexidine 4% Liquid 1 Application(s) Topical <User Schedule>  ciprofloxacin     Tablet 500 milliGRAM(s) Oral every 12 hours  dextrose 5%. 1000 milliLiter(s) (50 mL/Hr) IV Continuous <Continuous>  dextrose 5%. 1000 milliLiter(s) (100 mL/Hr) IV Continuous <Continuous>  dextrose 50% Injectable 25 Gram(s) IV Push once  dextrose 50% Injectable 12.5 Gram(s) IV Push once  dextrose 50% Injectable 25 Gram(s) IV Push once  doxazosin 4 milliGRAM(s) Oral at bedtime  enoxaparin Injectable 80 milliGRAM(s) SubCutaneous every 12 hours  fluticasone propionate/ salmeterol 100-50 MICROgram(s) Diskus 1 Dose(s) Inhalation two times a day  furosemide   Injectable 40 milliGRAM(s) IV Push every 12 hours  glucagon  Injectable 1 milliGRAM(s) IntraMuscular once  insulin glargine Injectable (LANTUS) 20 Unit(s) SubCutaneous at bedtime  insulin lispro (ADMELOG) corrective regimen sliding scale   SubCutaneous three times a day before meals  insulin lispro (ADMELOG) corrective regimen sliding scale   SubCutaneous at bedtime  metoprolol succinate ER 25 milliGRAM(s) Oral daily  mineral oil enema 133 milliLiter(s) Rectal daily  potassium chloride   Powder 40 milliEquivalent(s) Oral every 4 hours  sodium chloride 1 Gram(s) Oral two times a day  sulfaSALAzine 500 milliGRAM(s) Oral daily    MEDICATIONS  (PRN):  acetaminophen     Tablet .. 650 milliGRAM(s) Oral every 6 hours PRN Mild Pain (1 - 3)  acetaminophen   IVPB .. 1000 milliGRAM(s) IV Intermittent once PRN Mild Pain (1 - 3)  dextrose Oral Gel 15 Gram(s) Oral once PRN Blood Glucose LESS THAN 70 milliGRAM(s)/deciliter  melatonin 3 milliGRAM(s) Oral at bedtime PRN Insomnia  ondansetron Injectable 4 milliGRAM(s) IV Push every 6 hours PRN Nausea and/or Vomiting    Vital Signs Last 24 Hrs  T(C): 36.7 (28 Jan 2025 09:50), Max: 36.7 (28 Jan 2025 09:50)  T(F): 98 (28 Jan 2025 09:50), Max: 98 (28 Jan 2025 09:50)  HR: 84 (28 Jan 2025 09:50) (70 - 84)  BP: 125/52 (28 Jan 2025 09:50) (110/51 - 138/64)  BP(mean): 84 (28 Jan 2025 09:50) (66 - 84)  RR: 18 (28 Jan 2025 09:50) (17 - 18)  SpO2: 99% (28 Jan 2025 09:50) (96% - 100%)    Parameters below as of 28 Jan 2025 08:25  Patient On (Oxygen Delivery Method): nasal cannula      PHYSICAL EXAM:  Constitutional: NAD, awake and alert  HEENT:  EOMI,  Pupils round, No oral cyanosis.  Pulmonary: Non-labored, lungs diminished bilaterally  Cardiovascular: S1 and S2, regular rate and rhythm, no Murmurs, gallops or rubs  Gastrointestinal: Bowel Sounds present, soft, nontender.   Lymph: 1+ B/L LE edema  Neurological: Alert, no focal deficits  Skin: No rashes.  Psych:  Mood & affect appropriate    LABS: reviewed                           11.0   4.17  )-----------( 129      ( 28 Jan 2025 08:08 )             34.0                                      11.0   5.10  )-----------( 134      ( 27 Jan 2025 06:17 )             33.6     01-28    131[L]  |  88[L]  |  15  ----------------------------<  82  2.9[LL]   |  41[H]  |  0.78    Ca    9.5      28 Jan 2025 08:08  Phos  3.1     01-27  Mg     1.7     01-28    TPro  5.6[L]  /  Alb  2.6[L]  /  TBili  0.6  /  DBili  x   /  AST  19  /  ALT  12  /  AlkPhos  53  01-28 01-27    131[L]  |  92[L]  |  17  ----------------------------<  107[H]  3.3[L]   |  36[H]  |  0.81    Ca    9.5      27 Jan 2025 06:17  Phos  3.1     01-27  Mg     1.5     01-27    TPro  x   /  Alb  2.3[L]  /  TBili  x   /  DBili  x   /  AST  x   /  ALT  x   /  AlkPhos  x   01-27      Radiology/EKG/TTE: reviewed     < from: Xray Chest 1 View- PORTABLE-Urgent (Xray Chest 1 View- PORTABLE-Urgent .) (12.24.24 @ 16:00) >  IMPRESSION:  1. Cardiomegaly with unipolar pacemaker lead in the right ventricle,   central pulmonary venous congestion and bibasilar pleural effusions, left   greater than right, where there is also suspected left lower lobe   atelectasis. These findings are increased from the prior exam.    < end of copied text >      < from: CT Abdomen and Pelvis No Cont (01.23.25 @ 16:23) >    IMPRESSION:  Bilateral pleural effusions.    Pericardial effusion. Cardiomegaly.    Bilateral renal cysts, some of which with rim calcification. Right renal   mass. Also bladder clot, cannot exclude mass. Recommend CT urogram if   feasible.    Diverticulosis.    Extensive atherosclerosis.    < end of copied text >      < from: US Duplex Venous Upper Ext Ltd, Left (01.25.25 @ 10:46) >    IMPRESSION:  No evidence of left upper extremity deep venous thrombosis.    < end of copied text >      < from: US Duplex Venous Upper Ext Ltd, Left (01.25.25 @ 10:46) >  IMPRESSION:  No evidence of left upper extremity deep venous thrombosis.    < end of copied text >    < from: TTE W or WO Ultrasound Enhancing Agent (01.24.25 @ 14:11) >  CONCLUSIONS:      1. Mild left ventricular hypertrophy.   2. Left ventricular cavity is normal in size. Left ventricular wall thickness is mildly increased. Left ventricular systolic function isnormal with an ejection fraction visually estimated at 55 to 60 %.   3. Mildly enlarged right ventricular cavity size and normal right ventricular systolic function.   4. Left atrium is severely dilated.   5. Mild to moderate mitral regurgitation.   6. Moderate tricuspid regurgitation.   7. Estimated pulmonary artery systolic pressure is 75 mmHg, consistent with severe pulmonary hypertension.   8. The peak transaortic velocity is 2.32 m/s, peak transaortic gradient is 21.5 mmHg and mean transaortic gradient is 13.0 mmHg with an LVOT/aortic valve VTI ratio of 0.35. The effective orifice area is estimated at 1.10 cm² by the continuity equation and 1.44 cm² by 2D planimetry.   9. There is moderate calcification of the aortic valve leaflets. Moderate aortic stenosis.  10. Mild pulmonic regurgitation.  11. Moderate pericardial effusion with no echocardiographic evidence of tamponade physiology.  12. Moderate left pleural effusion noted.    ________________________________________________________________________________________    < end of copied text >

## 2025-01-28 NOTE — PROGRESS NOTE ADULT - SUBJECTIVE AND OBJECTIVE BOX
HOSPITALIST ATTENDING PROGRESS NOTE    Chart and meds reviewed.  Patient seen and examined.    CC: Hematuria     - + gen weakness, + leg edema better , Feels better, no chest pain or sob. No Fever, denies cp, dyspnea, abdominal pain    All other systems reviewed and found to be negative with the exception of what has been described above.    Vital sings reviewed for last 24 h  T(C): 36.7 (25 @ 15:45), Max: 36.7 (25 @ 09:50)  T(F): 98 (25 @ 15:45), Max: 98 (25 @ 09:50)  HR: 78 (25 @ 15:45) (70 - 84)  BP: 132/59 (25 @ 18:47) (110/51 - 138/64)  RR: 18 (25 @ 15:45) (18 - 18)  SpO2: 100% (25 @ 15:45) (96% - 100%)  Wt(kg): --  Daily     Daily Weight in k (2025 05:35)  CAPILLARY BLOOD GLUCOSE      POCT Blood Glucose.: 86 mg/dL (2025 16:53)  POCT Blood Glucose.: 88 mg/dL (2025 12:59)  POCT Blood Glucose.: 91 mg/dL (2025 08:37)    Physical exam :   GEN: Flat affect, NAD  HEENT:  pupils equal and reactive, EOMI, no oropharyngeal lesions, erythema, exudates, oral thrush  NECK:   supple, no carotid bruits  CV:  +S1, +S2, regular, no murmurs  RESP:   lungs clear to auscultation bilaterally, no wheezing, rales, rhonchi, good air entry bilaterally  GI:  abdomen soft, non-tender, non-distended, normal BS  EXT:  no clubbing, no cyanosis, 2 + edema, no calf pain, swelling or erythema. LUE swelling  NEURO:  AAOX3, no focal neurological deficits, follows all commands, able to move extremities spontaneously  SKIN:  no ulcers, lesions or rashes  : Crump     LABS: all reviewed                           11.0   4.17  )-----------( 129      ( 2025 08:08 )             34.0         131[L]  |  88[L]  |  15  ----------------------------<  82  2.9[LL]   |  41[H]  |  0.78    Ca    9.5      2025 08:08  Phos  3.1       Mg     1.7         TPro  5.6[L]  /  Alb  2.6[L]  /  TBili  0.6  /  DBili  x   /  AST  19  /  ALT  12  /  AlkPhos  53          LIVER FUNCTIONS - ( 2025 08:08 )  Alb: 2.6 g/dL / Pro: 5.6 gm/dL / ALK PHOS: 53 U/L / ALT: 12 U/L / AST: 19 U/L / GGT: x                US Duplex Venous Upper Ext Ltd, Left (25 @ 10:46) >  IMPRESSION:  No evidence of left upper extremity deep venous thrombosis.    CT Abdomen and Pelvis Urogram w/wo IV Cont (25 @ 10:17) >    IMPRESSION:  Resolution of previously seen urinary bladder blood clot.    No hydronephrosis or suspicious urinary tract mass identified.    Unchanged large bilateral pleural effusions.    Unchanged small pericardial effusion.         CT Abdomen and Pelvis No Cont (25 @ 16:23) >    IMPRESSION:  Bilateral pleural effusions.    Pericardial effusion. Cardiomegaly.    Bilateral renal cysts, some of which with rim calcification. Right renal   mass. Also bladder clot, cannot exclude mass. Recommend CT urogram if   feasible.    Diverticulosis.    Extensive atherosclerosis.    MEDICATIONS  (STANDING):  albumin human 25% IVPB 50 milliLiter(s) IV Intermittent every 12 hours  atorvastatin 80 milliGRAM(s) Oral at bedtime  chlorhexidine 4% Liquid 1 Application(s) Topical <User Schedule>  ciprofloxacin     Tablet 500 milliGRAM(s) Oral every 12 hours  dextrose 5%. 1000 milliLiter(s) (100 mL/Hr) IV Continuous <Continuous>  dextrose 5%. 1000 milliLiter(s) (50 mL/Hr) IV Continuous <Continuous>  dextrose 50% Injectable 25 Gram(s) IV Push once  dextrose 50% Injectable 12.5 Gram(s) IV Push once  dextrose 50% Injectable 25 Gram(s) IV Push once  doxazosin 4 milliGRAM(s) Oral at bedtime  enoxaparin Injectable 70 milliGRAM(s) SubCutaneous every 12 hours  fluticasone propionate/ salmeterol 100-50 MICROgram(s) Diskus 1 Dose(s) Inhalation two times a day  glucagon  Injectable 1 milliGRAM(s) IntraMuscular once  insulin lispro (ADMELOG) corrective regimen sliding scale   SubCutaneous three times a day before meals  insulin lispro (ADMELOG) corrective regimen sliding scale   SubCutaneous at bedtime  metoprolol succinate ER 25 milliGRAM(s) Oral daily  mineral oil enema 133 milliLiter(s) Rectal daily  sodium chloride 1 Gram(s) Oral two times a day  sulfaSALAzine 500 milliGRAM(s) Oral daily    MEDICATIONS  (PRN):  acetaminophen     Tablet .. 650 milliGRAM(s) Oral every 6 hours PRN Mild Pain (1 - 3)  acetaminophen   IVPB .. 1000 milliGRAM(s) IV Intermittent once PRN Mild Pain (1 - 3)  dextrose Oral Gel 15 Gram(s) Oral once PRN Blood Glucose LESS THAN 70 milliGRAM(s)/deciliter  melatonin 3 milliGRAM(s) Oral at bedtime PRN Insomnia  ondansetron Injectable 4 milliGRAM(s) IV Push every 6 hours PRN Nausea and/or Vomiting

## 2025-01-28 NOTE — PROGRESS NOTE ADULT - SUBJECTIVE AND OBJECTIVE BOX
NEPHROLOGY INTERVAL HPI/OVERNIGHT EVENTS:  25 @ 19:00      --No acute distress. resting comfortably.  ate tuna without bread for lunch.  UO 2.6L  --No acute distress.  No new complaints.  Aide by bedside, had 2 yogurt for breakfast.   UO 4L  --Resting comfortably. Denies SOB.  Pt's daughter relates long hx of Hyponatremia and CHF /anasarca mainly managed by his Cardiologist. Dr Jenkins           Had been on varying dose of lasix with NACL tabs 1 gm QOD and then daily.           Pt had been self catheterizing( done by aide)  until  admission in 2024 for CHF and pericardial effusion ( window done)--d/c'd with indwelling catheter.    HPI:  Patient is an 89 y/o M with a PMH of A-fib on Eliquis, GERD, hypertension, hyperlipidemia, CVA, diabetes, rectal CA with chronic diarrhea from enemas, atonic bladder with self-catheterizations, status post permanent pacemaker for bradycardia and syncope and persistent known moderate sized pericardial effusion presenting to  ED on 25 for suprapubic pain.   hx from daughter at bedside  has hx of pericardial effusion s/p windon in 10/24 at [facility]  hx of pleural effusion s/p thoracentesis 10/24  has been on lasix with na of 121 and k of 5.7 on    advised byhis cardiologist to dc his lasix and start nacl tabs with dc of kcl tabs  restarted on lasix 40 qd  CTS surgery for eval fo thoracentesis   chronic robertson   CBI in progress   minimal po intake including supplements     PAST MEDICAL & SURGICAL HISTORY:  - htn   - rectal ca   - bph with chronic robertson   - diast chf  - hx of pericardial effusion s/p window  - hx of pleural effusion   MEDICATIONS  (STANDING):  albumin human 25% IVPB 50 milliLiter(s) IV Intermittent every 12 hours  atorvastatin 80 milliGRAM(s) Oral at bedtime  chlorhexidine 4% Liquid 1 Application(s) Topical <User Schedule>  ciprofloxacin     Tablet 500 milliGRAM(s) Oral every 12 hours  dextrose 5%. 1000 milliLiter(s) (100 mL/Hr) IV Continuous <Continuous>  dextrose 5%. 1000 milliLiter(s) (50 mL/Hr) IV Continuous <Continuous>  dextrose 50% Injectable 25 Gram(s) IV Push once  dextrose 50% Injectable 12.5 Gram(s) IV Push once  dextrose 50% Injectable 25 Gram(s) IV Push once  doxazosin 4 milliGRAM(s) Oral at bedtime  enoxaparin Injectable 70 milliGRAM(s) SubCutaneous every 12 hours  fluticasone propionate/ salmeterol 100-50 MICROgram(s) Diskus 1 Dose(s) Inhalation two times a day  furosemide   Injectable 40 milliGRAM(s) IV Push every 12 hours  glucagon  Injectable 1 milliGRAM(s) IntraMuscular once  insulin glargine Injectable (LANTUS) 20 Unit(s) SubCutaneous at bedtime  insulin lispro (ADMELOG) corrective regimen sliding scale   SubCutaneous three times a day before meals  insulin lispro (ADMELOG) corrective regimen sliding scale   SubCutaneous at bedtime  metoprolol succinate ER 25 milliGRAM(s) Oral daily  mineral oil enema 133 milliLiter(s) Rectal daily  sodium chloride 1 Gram(s) Oral two times a day  sulfaSALAzine 500 milliGRAM(s) Oral daily    MEDICATIONS  (PRN):  acetaminophen     Tablet .. 650 milliGRAM(s) Oral every 6 hours PRN Mild Pain (1 - 3)  acetaminophen   IVPB .. 1000 milliGRAM(s) IV Intermittent once PRN Mild Pain (1 - 3)  dextrose Oral Gel 15 Gram(s) Oral once PRN Blood Glucose LESS THAN 70 milliGRAM(s)/deciliter  melatonin 3 milliGRAM(s) Oral at bedtime PRN Insomnia  ondansetron Injectable 4 milliGRAM(s) IV Push every 6 hours PRN Nausea and/or Vomiting      Allergies    No Known Allergies    Intolerances        I&O's Detail    2025 07:  -  2025 07:00  --------------------------------------------------------  IN:  Total IN: 0 mL    OUT:    Indwelling Catheter - Urethral (mL): 4250 mL  Total OUT: 4250 mL    Total NET: -4250 mL      2025 07:01  -  2025 19:00  --------------------------------------------------------  IN:  Total IN: 0 mL    OUT:    Indwelling Catheter - Urethral (mL): 2050 mL  Total OUT: 2050 mL    Total NET: -2050 mL          Vital Signs Last 24 Hrs  T(C): 36.7 (2025 15:45), Max: 36.7 (2025 09:50)  T(F): 98 (2025 15:45), Max: 98 (2025 09:50)  HR: 78 (2025 15:45) (70 - 84)  BP: 129/64 (2025 15:45) (110/51 - 138/64)  BP(mean): 84 (2025 09:50) (66 - 84)  RR: 18 (2025 15:45) (18 - 18)  SpO2: 100% (2025 15:45) (96% - 100%)    Parameters below as of 2025 15:45  Patient On (Oxygen Delivery Method): nasal cannula      Daily     Daily Weight in k (2025 05:35)    PHYSICAL EXAM:  General: alert. awake Ox3  HEENT: MMM  CV: s1s2 rrr  LUNGS: B/L CTA  EXT: no edema    LABS:                        11.0   4.17  )-----------( 129      ( 2025 08:08 )             34.0     -    131[L]  |  88[L]  |  15  ----------------------------<  82  2.9[LL]   |  41[H]  |  0.78    Ca    9.5      2025 08:08  Phos  3.1     01-27  Mg     1.7         TPro  5.6[L]  /  Alb  2.6[L]  /  TBili  0.6  /  DBili  x   /  AST  19  /  ALT  12  /  AlkPhos  53  28      Urinalysis Basic - ( 2025 08:08 )    Color: x / Appearance: x / SG: x / pH: x  Gluc: 82 mg/dL / Ketone: x  / Bili: x / Urobili: x   Blood: x / Protein: x / Nitrite: x   Leuk Esterase: x / RBC: x / WBC x   Sq Epi: x / Non Sq Epi: x / Bacteria: x      Magnesium: 1.7 mg/dL ( @ 08:08)       NEPHROLOGY INTERVAL HPI/OVERNIGHT EVENTS:  25 @ 19:00     as per aide at bedside  better po itntake UOP ~4L, sob improved   --No acute distress. resting comfortably.  ate tuna without bread for lunch.  UO 2.6L  --No acute distress.  No new complaints.  Aide by bedside, had 2 yogurt for breakfast.   UO 4L  --Resting comfortably. Denies SOB.  Pt's daughter relates long hx of Hyponatremia and CHF /anasarca mainly managed by his Cardiologist. Dr Jenkins           Had been on varying dose of lasix with NACL tabs 1 gm QOD and then daily.           Pt had been self catheterizing( done by aide)  until  admission in 2024 for CHF and pericardial effusion ( window done)--d/c'd with indwelling catheter.    HPI:  Patient is an 89 y/o M with a PMH of A-fib on Eliquis, GERD, hypertension, hyperlipidemia, CVA, diabetes, rectal CA with chronic diarrhea from enemas, atonic bladder with self-catheterizations, status post permanent pacemaker for bradycardia and syncope and persistent known moderate sized pericardial effusion presenting to  ED on 25 for suprapubic pain.   hx from daughter at bedside  has hx of pericardial effusion s/p windon in 10/24 at [facility]  hx of pleural effusion s/p thoracentesis 10/24  has been on lasix with na of 121 and k of 5.7 on    advised byhis cardiologist to dc his lasix and start nacl tabs with dc of kcl tabs  restarted on lasix 40 qd  CTS surgery for eval fo thoracentesis   chronic robertson   CBI in progress   minimal po intake including supplements     PAST MEDICAL & SURGICAL HISTORY:  - htn   - rectal ca   - bph with chronic robertson   - diast chf  - hx of pericardial effusion s/p window  - hx of pleural effusion   MEDICATIONS  (STANDING):  albumin human 25% IVPB 50 milliLiter(s) IV Intermittent every 12 hours  atorvastatin 80 milliGRAM(s) Oral at bedtime  chlorhexidine 4% Liquid 1 Application(s) Topical <User Schedule>  ciprofloxacin     Tablet 500 milliGRAM(s) Oral every 12 hours  dextrose 5%. 1000 milliLiter(s) (100 mL/Hr) IV Continuous <Continuous>  dextrose 5%. 1000 milliLiter(s) (50 mL/Hr) IV Continuous <Continuous>  dextrose 50% Injectable 25 Gram(s) IV Push once  dextrose 50% Injectable 12.5 Gram(s) IV Push once  dextrose 50% Injectable 25 Gram(s) IV Push once  doxazosin 4 milliGRAM(s) Oral at bedtime  enoxaparin Injectable 70 milliGRAM(s) SubCutaneous every 12 hours  fluticasone propionate/ salmeterol 100-50 MICROgram(s) Diskus 1 Dose(s) Inhalation two times a day  furosemide   Injectable 40 milliGRAM(s) IV Push every 12 hours  glucagon  Injectable 1 milliGRAM(s) IntraMuscular once  insulin glargine Injectable (LANTUS) 20 Unit(s) SubCutaneous at bedtime  insulin lispro (ADMELOG) corrective regimen sliding scale   SubCutaneous three times a day before meals  insulin lispro (ADMELOG) corrective regimen sliding scale   SubCutaneous at bedtime  metoprolol succinate ER 25 milliGRAM(s) Oral daily  mineral oil enema 133 milliLiter(s) Rectal daily  sodium chloride 1 Gram(s) Oral two times a day  sulfaSALAzine 500 milliGRAM(s) Oral daily    MEDICATIONS  (PRN):  acetaminophen     Tablet .. 650 milliGRAM(s) Oral every 6 hours PRN Mild Pain (1 - 3)  acetaminophen   IVPB .. 1000 milliGRAM(s) IV Intermittent once PRN Mild Pain (1 - 3)  dextrose Oral Gel 15 Gram(s) Oral once PRN Blood Glucose LESS THAN 70 milliGRAM(s)/deciliter  melatonin 3 milliGRAM(s) Oral at bedtime PRN Insomnia  ondansetron Injectable 4 milliGRAM(s) IV Push every 6 hours PRN Nausea and/or Vomiting      Allergies    No Known Allergies    Intolerances        I&O's Detail    2025 07:  -  2025 07:00  --------------------------------------------------------  IN:  Total IN: 0 mL    OUT:    Indwelling Catheter - Urethral (mL): 4250 mL  Total OUT: 4250 mL    Total NET: -4250 mL      2025 07:01  -  2025 19:00  --------------------------------------------------------  IN:  Total IN: 0 mL    OUT:    Indwelling Catheter - Urethral (mL): 2050 mL  Total OUT: 2050 mL    Total NET: -2050 mL          Vital Signs Last 24 Hrs  T(C): 36.7 (2025 15:45), Max: 36.7 (2025 09:50)  T(F): 98 (2025 15:45), Max: 98 (2025 09:50)  HR: 78 (2025 15:45) (70 - 84)  BP: 129/64 (2025 15:45) (110/51 - 138/64)  BP(mean): 84 (2025 09:50) (66 - 84)  RR: 18 (2025 15:45) (18 - 18)  SpO2: 100% (2025 15:45) (96% - 100%)    Parameters below as of 2025 15:45  Patient On (Oxygen Delivery Method): nasal cannula      Daily     Daily Weight in k (2025 05:35)    PHYSICAL EXAM:  General: alert. awake Ox3  HEENT: MMM  CV: s1s2 rrr  LUNGS: B/L CTA  EXT: no edema    LABS:                        11.0   4.17  )-----------( 129      ( 2025 08:08 )             34.0         131[L]  |  88[L]  |  15  ----------------------------<  82  2.9[LL]   |  41[H]  |  0.78    Ca    9.5      2025 08:08  Phos  3.1       Mg     1.7         TPro  5.6[L]  /  Alb  2.6[L]  /  TBili  0.6  /  DBili  x   /  AST  19  /  ALT  12  /  AlkPhos  53        Urinalysis Basic - ( 2025 08:08 )    Color: x / Appearance: x / SG: x / pH: x  Gluc: 82 mg/dL / Ketone: x  / Bili: x / Urobili: x   Blood: x / Protein: x / Nitrite: x   Leuk Esterase: x / RBC: x / WBC x   Sq Epi: x / Non Sq Epi: x / Bacteria: x      Magnesium: 1.7 mg/dL ( @ 08:08)

## 2025-01-28 NOTE — PROGRESS NOTE ADULT - NS ATTEND AMEND GEN_ALL_CORE FT
agree w/ above.
Discussed above with NP   Acute decompensated HF - lasix initiated with worsening Na- appreciate renal input. holding further diuretic for now   Moderate pericardial effusion without echo or clinical tamponade
Moderate pericardial effusion , recurred from oct 2024 , would recommend follow up limited echo
Acute decompensated HF - c/b hyponatremia: appreciate renal input lasix increased   Moderate pericardial effusion without echo or clinical tamponade.

## 2025-01-28 NOTE — PROGRESS NOTE ADULT - ASSESSMENT
89 y/o M with a PMH of A-fib on Eliquis, GERD, hypertension, hyperlipidemia, CVA, diabetes, rectal CA with chronic diarrhea from enemas, atonic bladder with self-catheterizations, status post permanent pacemaker for bradycardia and syncope and persistent known moderate sized pericardial effusion presenting to  ED on 1/23/25 for suprapubic pain. He has a chronic Robertson that was placed in [facility] on 12/24/24. Robertson was functioning well until the morning of admission when the home health aide noted bright red blood in the bag. Patient also complaining of lower abdominal pain.  Patient is on daily Eliquis.  No fevers.  Normal p.o. intake.  No back pain.  Denies history of kidney stones. Prior to having an indwelling Robertson placed in December 2024 patient would self cath daily (6 times a day).    #Hematuria s/p Robertson   # MDRO UTI   - Robertson changed in the ED - now without blood appreciated   - Hgb stable    - Abnormal UA  - SP rocephin--> PO cipro   - neg blood cultures   - Urine growing MDRO pseud and E Faecalis  - ID consult appreciated   - F/u urology consult discharged with indwelling robertson Patient can follow up with urologist at [facility] for further management  - CT urogram negative for bladder mass  - restart lovenox 1mg/kg q 12    #Moderate to large bilateral pleural effusions  # Likely  Acute on chronic HFpEF  # Pericardial effusion   - As per CT abdomen and pelvis   - Thoracic on board, monitor on lasix    - BNP elevated 2800  - Echo reviewed as above  - Lasix IV BID 40 --> po 40 qd   - CXR 1/28 - stable effusions  - repeat echo - pending  - Albumin BID  - Moderate pericardial effusion SP pericardial window 10/24  - Cardiology consult appreciated    #Hyponatremia   - hypervolemic hyponatremia   - He received 1L NS bolus in the ED  -  today  - Continue lasix   - F/U urine/serum studies   - Nephrology consult appreciated  - Add NACL 1 gm bid  - Continue albumin    # Hypokalemia, low bordeline Mg   - Replete     # LUE swelling  - Doppler negative  - Likely infiltrate     #Afib   -  Lovenox 1mg/kg q 12 80--> 70 bid adjusted for weight   - Continue metoprolol succinate 25 mg QD    # History of UC  - Continue sulfasalazine    # BPH  - Chronic robertson  - Will continue doxasozin till urology sees    #h/o T2DM  with A1C 5.5   - Continue lantus - stop 20 units hs  - Continue insulin sliding scale  - liberize diet    #DVT ppx   - Lovenox   - SCDs    Daughter Lilibeth [phone] updated       # Dispo: RUSTY Long/Card recs, finish abx. Restarted lovenox monitor for hematuria. repeat echo - pending DC in 1-2 days

## 2025-01-28 NOTE — PROGRESS NOTE ADULT - PROBLEM SELECTOR PLAN 1
·  Problem: HF (heart failure).   ·  Recommendation: chronic HFpEF with recent pericardial effusion s/.p pericardiocentesis and pleural effusion s/p thoracentesis at [facility] in 10/24, now with pericardial and B/L pleural effusion left>right   Recommendation: Echo performed, NL LVF, EF 55-60%, mild-Mod MR, Mod TR, severe pulmonary HTN.    . cont diuresis with lasix 40 mg ivp bid with close monitor of renal function.  Renal input appreciated   .  CT Sx consult appreciated - will monitor with cxr, IV diuresis, daily weight.  Nothing to do in acute setting ·  Problem: HF (heart failure).   ·  Recommendation: chronic HFpEF with recent pericardial effusion s/.p pericardiocentesis and pleural effusion s/p thoracentesis at [facility] in 10/24, now with pericardial and B/L pleural effusion left>right   Recommendation: Echo performed, NL LVF, EF 55-60%, mild-Mod MR, Mod TR, severe pulmonary HTN.    . cont diuresis with lasix 40 mg ivp bid with close monitor of renal function.  Renal following  .  Pt hypokamelic (K2.9), being supplemented   .  CT Sx consult appreciated - will monitor with cxr, IV diuresis, daily weight.  Nothing to do in acute setting

## 2025-01-28 NOTE — PROGRESS NOTE ADULT - PROBLEM SELECTOR PLAN 2
·  Problem: Pericardial effusion.   ·  Recommendation: pt; with recent pericardial effusion s/p pericardiocentesis on 10/24 at [facility], CT shows pericardial effusion and cardiomegaly, Echo results as above ·  Problem: Pericardial effusion.   ·  Recommendation: pt; with recent pericardial effusion s/p pericardiocentesis on 10/24 at [facility], CT shows pericardial effusion and cardiomegaly, Echo moderate effusion  which needs to monitor with periodic echo , as it recurred from  oct 2024 ,

## 2025-01-29 ENCOUNTER — TRANSCRIPTION ENCOUNTER (OUTPATIENT)
Age: 89
End: 2025-01-29

## 2025-01-29 ENCOUNTER — RESULT REVIEW (OUTPATIENT)
Age: 89
End: 2025-01-29

## 2025-01-29 VITALS
HEART RATE: 81 BPM | TEMPERATURE: 98 F | OXYGEN SATURATION: 95 % | DIASTOLIC BLOOD PRESSURE: 65 MMHG | RESPIRATION RATE: 18 BRPM | SYSTOLIC BLOOD PRESSURE: 136 MMHG

## 2025-01-29 LAB
24R-OH-CALCIDIOL SERPL-MCNC: 28.7 NG/ML — LOW (ref 30–80)
ADD ON TEST-SPECIMEN IN LAB: SIGNIFICANT CHANGE UP
ALBUMIN SERPL ELPH-MCNC: 3 G/DL — LOW (ref 3.3–5)
ALP SERPL-CCNC: 53 U/L — SIGNIFICANT CHANGE UP (ref 40–120)
ALT FLD-CCNC: 15 U/L — SIGNIFICANT CHANGE UP (ref 12–78)
ANION GAP SERPL CALC-SCNC: 1 MMOL/L — LOW (ref 5–17)
AST SERPL-CCNC: 22 U/L — SIGNIFICANT CHANGE UP (ref 15–37)
BASOPHILS # BLD AUTO: 0.02 K/UL — SIGNIFICANT CHANGE UP (ref 0–0.2)
BASOPHILS NFR BLD AUTO: 0.5 % — SIGNIFICANT CHANGE UP (ref 0–2)
BILIRUB SERPL-MCNC: 0.6 MG/DL — SIGNIFICANT CHANGE UP (ref 0.2–1.2)
BUN SERPL-MCNC: 15 MG/DL — SIGNIFICANT CHANGE UP (ref 7–23)
CALCIUM SERPL-MCNC: 10 MG/DL — SIGNIFICANT CHANGE UP (ref 8.5–10.1)
CHLORIDE SERPL-SCNC: 90 MMOL/L — LOW (ref 96–108)
CO2 SERPL-SCNC: 42 MMOL/L — HIGH (ref 22–31)
CREAT SERPL-MCNC: 0.74 MG/DL — SIGNIFICANT CHANGE UP (ref 0.5–1.3)
CRP SERPL-MCNC: 12.1 MG/ML — HIGH (ref 0–5)
EGFR: 87 ML/MIN/1.73M2 — SIGNIFICANT CHANGE UP
EOSINOPHIL # BLD AUTO: 0.04 K/UL — SIGNIFICANT CHANGE UP (ref 0–0.5)
EOSINOPHIL NFR BLD AUTO: 0.9 % — SIGNIFICANT CHANGE UP (ref 0–6)
FERRITIN SERPL-MCNC: 130 NG/ML — SIGNIFICANT CHANGE UP (ref 30–400)
FOLATE SERPL-MCNC: 4.7 NG/ML — SIGNIFICANT CHANGE UP
GLUCOSE BLDC GLUCOMTR-MCNC: 142 MG/DL — HIGH (ref 70–99)
GLUCOSE BLDC GLUCOMTR-MCNC: 84 MG/DL — SIGNIFICANT CHANGE UP (ref 70–99)
GLUCOSE SERPL-MCNC: 94 MG/DL — SIGNIFICANT CHANGE UP (ref 70–99)
HCT VFR BLD CALC: 37.1 % — LOW (ref 39–50)
HGB BLD-MCNC: 11.8 G/DL — LOW (ref 13–17)
IMM GRANULOCYTES NFR BLD AUTO: 0.5 % — SIGNIFICANT CHANGE UP (ref 0–0.9)
IRON SATN MFR SERPL: 25 % — SIGNIFICANT CHANGE UP (ref 16–55)
IRON SATN MFR SERPL: 49 UG/DL — SIGNIFICANT CHANGE UP (ref 45–165)
LYMPHOCYTES # BLD AUTO: 1.21 K/UL — SIGNIFICANT CHANGE UP (ref 1–3.3)
LYMPHOCYTES # BLD AUTO: 28.3 % — SIGNIFICANT CHANGE UP (ref 13–44)
MAGNESIUM SERPL-MCNC: 1.7 MG/DL — SIGNIFICANT CHANGE UP (ref 1.6–2.6)
MCHC RBC-ENTMCNC: 29.4 PG — SIGNIFICANT CHANGE UP (ref 27–34)
MCHC RBC-ENTMCNC: 31.8 G/DL — LOW (ref 32–36)
MCV RBC AUTO: 92.5 FL — SIGNIFICANT CHANGE UP (ref 80–100)
MONOCYTES # BLD AUTO: 0.36 K/UL — SIGNIFICANT CHANGE UP (ref 0–0.9)
MONOCYTES NFR BLD AUTO: 8.4 % — SIGNIFICANT CHANGE UP (ref 2–14)
NEUTROPHILS # BLD AUTO: 2.63 K/UL — SIGNIFICANT CHANGE UP (ref 1.8–7.4)
NEUTROPHILS NFR BLD AUTO: 61.4 % — SIGNIFICANT CHANGE UP (ref 43–77)
NT-PROBNP SERPL-SCNC: 2599 PG/ML — HIGH (ref 0–450)
PHOSPHATE SERPL-MCNC: 2.1 MG/DL — LOW (ref 2.5–4.5)
PLATELET # BLD AUTO: 144 K/UL — LOW (ref 150–400)
POTASSIUM SERPL-MCNC: 3.9 MMOL/L — SIGNIFICANT CHANGE UP (ref 3.5–5.3)
POTASSIUM SERPL-SCNC: 3.9 MMOL/L — SIGNIFICANT CHANGE UP (ref 3.5–5.3)
PROCALCITONIN SERPL-MCNC: 0.09 NG/ML — SIGNIFICANT CHANGE UP (ref 0.02–0.1)
PROT SERPL-MCNC: 6.1 GM/DL — SIGNIFICANT CHANGE UP (ref 6–8.3)
RBC # BLD: 4.01 M/UL — LOW (ref 4.2–5.8)
RBC # FLD: 14.7 % — HIGH (ref 10.3–14.5)
SODIUM SERPL-SCNC: 133 MMOL/L — LOW (ref 135–145)
TIBC SERPL-MCNC: 197 UG/DL — LOW (ref 220–430)
TSH SERPL-MCNC: 0.52 UU/ML — SIGNIFICANT CHANGE UP (ref 0.34–4.82)
UIBC SERPL-MCNC: 148 UG/DL — SIGNIFICANT CHANGE UP (ref 110–370)
VIT B12 SERPL-MCNC: 412 PG/ML — SIGNIFICANT CHANGE UP (ref 232–1245)
WBC # BLD: 4.28 K/UL — SIGNIFICANT CHANGE UP (ref 3.8–10.5)
WBC # FLD AUTO: 4.28 K/UL — SIGNIFICANT CHANGE UP (ref 3.8–10.5)

## 2025-01-29 PROCEDURE — 99232 SBSQ HOSP IP/OBS MODERATE 35: CPT | Mod: FS

## 2025-01-29 PROCEDURE — 93308 TTE F-UP OR LMTD: CPT | Mod: 26

## 2025-01-29 PROCEDURE — 93321 DOPPLER ECHO F-UP/LMTD STD: CPT | Mod: 26

## 2025-01-29 PROCEDURE — 99232 SBSQ HOSP IP/OBS MODERATE 35: CPT

## 2025-01-29 PROCEDURE — 99233 SBSQ HOSP IP/OBS HIGH 50: CPT | Mod: FS,25

## 2025-01-29 PROCEDURE — 99239 HOSP IP/OBS DSCHRG MGMT >30: CPT

## 2025-01-29 PROCEDURE — 76604 US EXAM CHEST: CPT | Mod: 26

## 2025-01-29 RX ORDER — MIRABEGRON 25 MG/1
1 TABLET, FILM COATED, EXTENDED RELEASE ORAL
Refills: 0 | DISCHARGE

## 2025-01-29 RX ORDER — CELECOXIB 200 MG
1 CAPSULE ORAL
Refills: 0 | DISCHARGE

## 2025-01-29 RX ORDER — MAGNESIUM SULFATE 0.8 MEQ/ML
1 AMPUL (ML) INJECTION ONCE
Refills: 0 | Status: COMPLETED | OUTPATIENT
Start: 2025-01-29 | End: 2025-01-29

## 2025-01-29 RX ORDER — APIXABAN 5 MG/1
1 TABLET, FILM COATED ORAL
Qty: 0 | Refills: 0 | DISCHARGE

## 2025-01-29 RX ORDER — TAMSULOSIN HYDROCHLORIDE 0.4 MG/1
1 CAPSULE ORAL
Qty: 30 | Refills: 0
Start: 2025-01-29 | End: 2025-02-27

## 2025-01-29 RX ORDER — CIPROFLOXACIN HCL 500 MG
1 TABLET ORAL
Qty: 10 | Refills: 0
Start: 2025-01-29 | End: 2025-02-02

## 2025-01-29 RX ORDER — SOD PHOSPHATE,MONOBASIC-DIBAS 3MMOL/ML
15 VIAL (ML) INTRAVENOUS ONCE
Refills: 0 | Status: COMPLETED | OUTPATIENT
Start: 2025-01-29 | End: 2025-01-29

## 2025-01-29 RX ORDER — POTASSIUM CHLORIDE 750 MG/1
40 TABLET, EXTENDED RELEASE ORAL ONCE
Refills: 0 | Status: COMPLETED | OUTPATIENT
Start: 2025-01-29 | End: 2025-01-29

## 2025-01-29 RX ADMIN — Medication 133 MILLILITER(S): at 11:07

## 2025-01-29 RX ADMIN — Medication 100 GRAM(S): at 10:51

## 2025-01-29 RX ADMIN — Medication 62.5 MILLIMOLE(S): at 10:51

## 2025-01-29 RX ADMIN — Medication 500 MILLIGRAM(S): at 09:00

## 2025-01-29 RX ADMIN — Medication 500 MILLIGRAM(S): at 09:02

## 2025-01-29 RX ADMIN — POTASSIUM CHLORIDE 40 MILLIEQUIVALENT(S): 750 TABLET, EXTENDED RELEASE ORAL at 09:01

## 2025-01-29 RX ADMIN — Medication 25 MILLIGRAM(S): at 09:02

## 2025-01-29 RX ADMIN — ANTISEPTIC SURGICAL SCRUB 1 APPLICATION(S): 0.04 SOLUTION TOPICAL at 09:11

## 2025-01-29 RX ADMIN — ALBUMIN HUMAN 50 MILLILITER(S): 50 SOLUTION INTRAVENOUS at 06:25

## 2025-01-29 RX ADMIN — Medication 40 MILLIGRAM(S): at 09:00

## 2025-01-29 NOTE — PHYSICAL THERAPY INITIAL EVALUATION ADULT - PERTINENT HX OF CURRENT PROBLEM, REHAB EVAL
Pt is a 89y old Male with hx  of fib on Eliquis, GERD, hypertension, hyperlipidemia, CVA, diabetes, rectal CA with chronic diarrhea from enemas, atonic bladder with self-catheterizations, status post permanent pacemaker for bradycardia and syncope and persistent known moderate sized pericardial effusion presenting to  ED on 1/23/25 for suprapubic pain. He has a chronic Crump that was placed in [facility] on 12/24/24. Crump was functioning well until the morning of admission when the home health aide noted bright red blood in the bag. Patient also complaining of lower abdominal pain.  Patient is on daily Eliquis.  Prior to having an indwelling Crump placed in December 2024 patient would self cath daily (6 times a day). CT abd without contrast showed bilateral pleural effusions. Pericardial effusion. Cardiomegaly .Bilateral renal cysts, some of which with rim calcification. Right renal mass. Also bladder clot, cannot exclude mass. Additional history obtained from home health aide present at bedside. She mentions he was admitted to [facility] in October 2024. There were bilateral pleural effusions. Patient was admitted to [facility] and started on IV diuretics. He then underwent pericardial window and thoracentesis. Post operative course complicated by abdominal pain and distention patient developed [place]'s syndrome. Patient was treated with the decompression colonoscopy.   Patient is reporting some suprapubic pain. He received IV Tylenol in the ED. Family is very cautious about using opiates because of his history of constipation. He requires a daily enema in the AM to have a bowel movement.

## 2025-01-29 NOTE — PROGRESS NOTE ADULT - PROBLEM/PLAN-3
DISPLAY PLAN FREE TEXT
14-Apr-2022 00:33

## 2025-01-29 NOTE — PROGRESS NOTE ADULT - REASON FOR ADMISSION
hematuria   UTI   pleural effusions

## 2025-01-29 NOTE — DISCHARGE NOTE PROVIDER - CARE PROVIDER_API CALL
Ronen Irving  Internal Medicine  [address] [place] [address]  Phone: [phone]  Fax: [phone]  Follow Up Time: 1-3 days    cardiologist,   Phone: (   )    -  Fax: (   )    -  Follow Up Time: 1 week    urologist,   Phone: (   )    -  Fax: (   )    -  Follow Up Time: 1 week

## 2025-01-29 NOTE — PROGRESS NOTE ADULT - TIME BILLING
face to face evaluation, review of data and plan
I spend mentioned above total minutes on direct patient care on the date of this encounter . This includes reviewing prior documentation, results and imaging in addition to completing a full history and physical examination on the patient. Further tests, medications, and procedures have been ordered as indicated. Laboratory results and the plan of care were communicated to the patient and/or their family member. Supporting documentation was completed and added to the patient's chart.   .
face to face evaluation, review of data and plan

## 2025-01-29 NOTE — PROGRESS NOTE ADULT - PROBLEM SELECTOR PLAN 3
·  Problem: Benign essential HTN.   ·  Recommendation: CW Toprol

## 2025-01-29 NOTE — PROGRESS NOTE ADULT - PROVIDER SPECIALTY LIST ADULT
Hospitalist
Hospitalist
Nephrology
Thoracic Surgery
Cardiology
Nephrology
Nephrology
Thoracic Surgery
Urology
Infectious Disease
Nephrology
Thoracic Surgery
Urology
Hospitalist
Hospitalist
Infectious Disease
Hospitalist
Nephrology
Palliative Care
Cardiology
Palliative Care
Cardiology

## 2025-01-29 NOTE — PROGRESS NOTE ADULT - SUBJECTIVE AND OBJECTIVE BOX
NEPHROLOGY INTERVAL HPI/OVERNIGHT EVENTS:    Date of Service: 25 @ 08:39    --   as per aide at bedside  better po itntake UOP ~4L, sob improved   --No acute distress. resting comfortably.  ate tuna without bread for lunch.  UO 2.6L  --No acute distress.  No new complaints.  Aide by bedside, had 2 yogurt for breakfast.   UO 4L  --Resting comfortably. Denies SOB.  Pt's daughter relates long hx of Hyponatremia and CHF /anasarca mainly managed by his Cardiologist. Dr Jenkins           Had been on varying dose of lasix with NACL tabs 1 gm QOD and then daily.           Pt had been self catheterizing( done by aide)  until  admission in 2024 for CHF and pericardial effusion ( window done)--d/c'd with indwelling catheter.    HPI:  Patient is an 89 y/o M with a PMH of A-fib on Eliquis, GERD, hypertension, hyperlipidemia, CVA, diabetes, rectal CA with chronic diarrhea from enemas, atonic bladder with self-catheterizations, status post permanent pacemaker for bradycardia and syncope and persistent known moderate sized pericardial effusion presenting to  ED on 25 for suprapubic pain.   hx from daughter at bedside  has hx of pericardial effusion s/p windon in 10/24 at [facility]  hx of pleural effusion s/p thoracentesis 10/24  has been on lasix with na of 121 and k of 5.7 on    advised byhis cardiologist to dc his lasix and start nacl tabs with dc of kcl tabs  restarted on lasix 40 qd  CTS surgery for eval fo thoracentesis   chronic robertson   CBI in progress   minimal po intake including supplements     PAST MEDICAL & SURGICAL HISTORY:  - htn   - rectal ca   - bph with chronic robertson   - diast chf  - hx of pericardial effusion s/p window  - hx of pleural effusion     MEDICATIONS  (STANDING):  albumin human 25% IVPB 50 milliLiter(s) IV Intermittent every 12 hours  atorvastatin 80 milliGRAM(s) Oral at bedtime  chlorhexidine 4% Liquid 1 Application(s) Topical <User Schedule>  ciprofloxacin     Tablet 500 milliGRAM(s) Oral every 12 hours  dextrose 5%. 1000 milliLiter(s) (100 mL/Hr) IV Continuous <Continuous>  dextrose 5%. 1000 milliLiter(s) (50 mL/Hr) IV Continuous <Continuous>  dextrose 50% Injectable 25 Gram(s) IV Push once  dextrose 50% Injectable 12.5 Gram(s) IV Push once  dextrose 50% Injectable 25 Gram(s) IV Push once  doxazosin 4 milliGRAM(s) Oral at bedtime  enoxaparin Injectable 70 milliGRAM(s) SubCutaneous every 12 hours  fluticasone propionate/ salmeterol 100-50 MICROgram(s) Diskus 1 Dose(s) Inhalation two times a day  furosemide    Tablet 40 milliGRAM(s) Oral daily  glucagon  Injectable 1 milliGRAM(s) IntraMuscular once  insulin lispro (ADMELOG) corrective regimen sliding scale   SubCutaneous three times a day before meals  insulin lispro (ADMELOG) corrective regimen sliding scale   SubCutaneous at bedtime  metoprolol succinate ER 25 milliGRAM(s) Oral daily  mineral oil enema 133 milliLiter(s) Rectal daily  sodium chloride 1 Gram(s) Oral two times a day  sulfaSALAzine 500 milliGRAM(s) Oral daily    MEDICATIONS  (PRN):  acetaminophen     Tablet .. 650 milliGRAM(s) Oral every 6 hours PRN Mild Pain (1 - 3)  acetaminophen   IVPB .. 1000 milliGRAM(s) IV Intermittent once PRN Mild Pain (1 - 3)  dextrose Oral Gel 15 Gram(s) Oral once PRN Blood Glucose LESS THAN 70 milliGRAM(s)/deciliter  melatonin 3 milliGRAM(s) Oral at bedtime PRN Insomnia  ondansetron Injectable 4 milliGRAM(s) IV Push every 6 hours PRN Nausea and/or Vomiting    Vital Signs Last 24 Hrs  T(C): 36.8 (2025 23:24), Max: 36.8 (2025 23:24)  T(F): 98.2 (2025 23:24), Max: 98.2 (2025 23:24)  HR: 79 (2025 23:24) (78 - 84)  BP: 127/62 (2025 23:24) (125/52 - 132/59)  BP(mean): 84 (2025 09:50) (84 - 84)  RR: 18 (2025 23:24) (18 - 18)  SpO2: 98% (2025 23:24) (98% - 100%)    Parameters below as of 2025 23:24  Patient On (Oxygen Delivery Method): nasal cannula       Daily Weight in k.3 (2025 06:19)     @ 07:01  -   @ 07:00  --------------------------------------------------------  IN: 0 mL / OUT: 4150 mL / NET: -4150 mL    PHYSICAL EXAM:  GENERAL:   CHEST/LUNG:   HEART:   ABDOMEN:   EXTREMITIES:   SKIN:     LABS:                        11.0   4.17  )-----------( 129      ( 2025 08:08 )             34.0         134[L]  |  90[L]  |  15  ----------------------------<  113[H]  3.4[L]   |  40[H]  |  0.72    Ca    9.6      2025 22:12  Phos  2.0       Mg     1.8         TPro  5.6[L]  /  Alb  2.6[L]  /  TBili  0.6  /  DBili  x   /  AST  19  /  ALT  12  /  AlkPhos  53        Urinalysis Basic - ( 2025 22:12 )    Color: x / Appearance: x / SG: x / pH: x  Gluc: 113 mg/dL / Ketone: x  / Bili: x / Urobili: x   Blood: x / Protein: x / Nitrite: x   Leuk Esterase: x / RBC: x / WBC x   Sq Epi: x / Non Sq Epi: x / Bacteria: x      Magnesium: 1.8 mg/dL ( @ 22:12)  Phosphorus: 2.0 mg/dL ( @ 22:12)          RADIOLOGY & ADDITIONAL TESTS:   NEPHROLOGY INTERVAL HPI/OVERNIGHT EVENTS:    Date of Service: 25 @ 08:39    --resting comfortably   No distress.  UO 4100cc.  Eating breakfast well.   as per aide at bedside  better po itntake UOP ~4L, sob improved   --No acute distress. resting comfortably.  ate tuna without bread for lunch.  UO 2.6L  --No acute distress.  No new complaints.  Aide by bedside, had 2 yogurt for breakfast.   UO 4L  --Resting comfortably. Denies SOB.  Pt's daughter relates long hx of Hyponatremia and CHF /anasarca mainly managed by his Cardiologist. Dr Jenkins           Had been on varying dose of lasix with NACL tabs 1 gm QOD and then daily.           Pt had been self catheterizing( done by aide)  until  admission in 2024 for CHF and pericardial effusion ( window done)--d/c'd with indwelling catheter.    HPI:  Patient is an 89 y/o M with a PMH of A-fib on Eliquis, GERD, hypertension, hyperlipidemia, CVA, diabetes, rectal CA with chronic diarrhea from enemas, atonic bladder with self-catheterizations, status post permanent pacemaker for bradycardia and syncope and persistent known moderate sized pericardial effusion presenting to  ED on 25 for suprapubic pain.   hx from daughter at bedside  has hx of pericardial effusion s/p windon in 10/24 at [facility]  hx of pleural effusion s/p thoracentesis 10/24  has been on lasix with na of 121 and k of 5.7 on    advised byhis cardiologist to dc his lasix and start nacl tabs with dc of kcl tabs  restarted on lasix 40 qd  CTS surgery for eval fo thoracentesis   chronic robertson   CBI in progress   minimal po intake including supplements     PAST MEDICAL & SURGICAL HISTORY:  - htn   - rectal ca   - bph with chronic robertson   - diast chf  - hx of pericardial effusion s/p window  - hx of pleural effusion     MEDICATIONS  (STANDING):  albumin human 25% IVPB 50 milliLiter(s) IV Intermittent every 12 hours  atorvastatin 80 milliGRAM(s) Oral at bedtime  chlorhexidine 4% Liquid 1 Application(s) Topical <User Schedule>  ciprofloxacin     Tablet 500 milliGRAM(s) Oral every 12 hours  dextrose 5%. 1000 milliLiter(s) (100 mL/Hr) IV Continuous <Continuous>  dextrose 5%. 1000 milliLiter(s) (50 mL/Hr) IV Continuous <Continuous>  dextrose 50% Injectable 25 Gram(s) IV Push once  dextrose 50% Injectable 12.5 Gram(s) IV Push once  dextrose 50% Injectable 25 Gram(s) IV Push once  doxazosin 4 milliGRAM(s) Oral at bedtime  enoxaparin Injectable 70 milliGRAM(s) SubCutaneous every 12 hours  fluticasone propionate/ salmeterol 100-50 MICROgram(s) Diskus 1 Dose(s) Inhalation two times a day  furosemide    Tablet 40 milliGRAM(s) Oral daily  glucagon  Injectable 1 milliGRAM(s) IntraMuscular once  insulin lispro (ADMELOG) corrective regimen sliding scale   SubCutaneous three times a day before meals  insulin lispro (ADMELOG) corrective regimen sliding scale   SubCutaneous at bedtime  metoprolol succinate ER 25 milliGRAM(s) Oral daily  mineral oil enema 133 milliLiter(s) Rectal daily  sodium chloride 1 Gram(s) Oral two times a day  sulfaSALAzine 500 milliGRAM(s) Oral daily    MEDICATIONS  (PRN):  acetaminophen     Tablet .. 650 milliGRAM(s) Oral every 6 hours PRN Mild Pain (1 - 3)  acetaminophen   IVPB .. 1000 milliGRAM(s) IV Intermittent once PRN Mild Pain (1 - 3)  dextrose Oral Gel 15 Gram(s) Oral once PRN Blood Glucose LESS THAN 70 milliGRAM(s)/deciliter  melatonin 3 milliGRAM(s) Oral at bedtime PRN Insomnia  ondansetron Injectable 4 milliGRAM(s) IV Push every 6 hours PRN Nausea and/or Vomiting    Vital Signs Last 24 Hrs  T(C): 36.8 (2025 23:24), Max: 36.8 (2025 23:24)  T(F): 98.2 (2025 23:24), Max: 98.2 (2025 23:24)  HR: 79 (2025 23:24) (78 - 84)  BP: 127/62 (2025 23:24) (125/52 - 132/59)  BP(mean): 84 (2025 09:50) (84 - 84)  RR: 18 (2025 23:24) (18 - 18)  SpO2: 98% (2025 23:24) (98% - 100%)    Parameters below as of 2025 23:24  Patient On (Oxygen Delivery Method): nasal cannula       Daily Weight in k.3 (2025 06:19)     @ 07:01  -   @ 07:00  --------------------------------------------------------  IN: 0 mL / OUT: 4150 mL / NET: -4150 mL    PHYSICAL EXAM:  GENERAL: no distress  CHEST/LUNG: fair air entry  HEART: S1S2 RRR  ABDOMEN: soft  EXTREMITIES: improving anasarca  SKIN:     LABS:                        11.0   4.17  )-----------( 129      ( 2025 08:08 )             34.0         134[L]  |  90[L]  |  15  ----------------------------<  113[H]  3.4[L]   |  40[H]  |  0.72    Ca    9.6      2025 22:12  Phos  2.0       Mg     1.8         TPro  5.6[L]  /  Alb  2.6[L]  /  TBili  0.6  /  DBili  x   /  AST  19  /  ALT  12  /  AlkPhos  53        Urinalysis Basic - ( 2025 22:12 )    Color: x / Appearance: x / SG: x / pH: x  Gluc: 113 mg/dL / Ketone: x  / Bili: x / Urobili: x   Blood: x / Protein: x / Nitrite: x   Leuk Esterase: x / RBC: x / WBC x   Sq Epi: x / Non Sq Epi: x / Bacteria: x      Magnesium: 1.8 mg/dL ( @ 22:12)  Phosphorus: 2.0 mg/dL ( @ 22:12)          RADIOLOGY & ADDITIONAL TESTS:

## 2025-01-29 NOTE — PROGRESS NOTE ADULT - PROBLEM SELECTOR PROBLEM 2
Pericardial effusion
Chronic indwelling Crump catheter
Pericardial effusion
Pericardial effusion

## 2025-01-29 NOTE — PROGRESS NOTE ADULT - PROBLEM SELECTOR PROBLEM 1
HF (heart failure)
Gross hematuria
HF (heart failure)
HF (heart failure)

## 2025-01-29 NOTE — PHYSICAL THERAPY INITIAL EVALUATION ADULT - BED MOBILITY TRAINING, PT EVAL
Pt will be able to perform bed mobility with SBA by discharge Pt will be able to perform bed mobility with min assist by discharge

## 2025-01-29 NOTE — PROGRESS NOTE ADULT - NUTRITIONAL ASSESSMENT
This patient has been assessed with a concern for Malnutrition and has been determined to have a diagnosis/diagnoses of Severe protein-calorie malnutrition.    This patient is being managed with:   Diet DASH/TLC-  Sodium & Cholesterol Restricted  Entered: Jan 23 2025  4:41PM  
This patient has been assessed with a concern for Malnutrition and has been determined to have a diagnosis/diagnoses of Severe protein-calorie malnutrition.    This patient is being managed with:   Diet Regular-  Supplement Feeding Modality:  Oral  Ensure Plant-Based Cans or Servings Per Day:  3       Frequency:  Daily  Entered: Jan 28 2025  9:46PM  
This patient has been assessed with a concern for Malnutrition and has been determined to have a diagnosis/diagnoses of Severe protein-calorie malnutrition.    This patient is being managed with:   Diet DASH/TLC-  Sodium & Cholesterol Restricted  Entered: Jan 23 2025  4:41PM  
This patient has been assessed with a concern for Malnutrition and has been determined to have a diagnosis/diagnoses of Severe protein-calorie malnutrition.    This patient is being managed with:   Diet Regular-  Supplement Feeding Modality:  Oral  Ensure Plant-Based Cans or Servings Per Day:  3       Frequency:  Daily  Entered: Jan 28 2025  9:46PM  
This patient has been assessed with a concern for Malnutrition and has been determined to have a diagnosis/diagnoses of Severe protein-calorie malnutrition.    This patient is being managed with:   Diet DASH/TLC-  Sodium & Cholesterol Restricted  Entered: Jan 23 2025  4:41PM  

## 2025-01-29 NOTE — DISCHARGE NOTE PROVIDER - PROVIDER TOKENS
PROVIDER:[TOKEN:[[id number]],FOLLOWUP:[1-3 days]],FREE:[LAST:[cardiologist],PHONE:[(   )    -],FAX:[(   )    -],FOLLOWUP:[1 week]],FREE:[LAST:[urologist],PHONE:[(   )    -],FAX:[(   )    -],FOLLOWUP:[1 week]]

## 2025-01-29 NOTE — PHYSICAL THERAPY INITIAL EVALUATION ADULT - GENERAL OBSERVATIONS, REHAB EVAL
Pt found in supine in bed on 1N +robertson, in NAD, aid at bedside, agreeable to participate in PT evaluation. Pt unable to perform transfers or ambulation due to weakness. Pt completed AROM for UEs and LEs with resistance. Pt left in bed with call bell and phone in reach, bed alarm on, RN Sirisha is aware. Pt found in supine in bed on 1N +robertson, in NAD, Private Home aide at bedside, agreeable to participate in PT evaluation. Pt unable to perform transfers or ambulation due to weakness. Pt completed AROM for UEs and LEs with resistance. Pt left in bed with call bell and phone in reach, bed alarm on, RN Sirisha is aware.

## 2025-01-29 NOTE — PROGRESS NOTE ADULT - SUBJECTIVE AND OBJECTIVE BOX
Subjective:  Pt seen, on 1-2L NC, no complaints. Denies SOB, chest pain.    Vital Signs:  Vital Signs Last 24 Hrs  T(C): 36.7 (01-29-25 @ 08:57), Max: 36.8 (01-28-25 @ 23:24)  T(F): 98.1 (01-29-25 @ 08:57), Max: 98.2 (01-28-25 @ 23:24)  HR: 87 (01-29-25 @ 08:57) (78 - 87)  BP: 135/60 (01-29-25 @ 08:57) (127/62 - 135/60)  RR: 18 (01-29-25 @ 08:57) (18 - 18)  SpO2: 98% (01-29-25 @ 08:57) (98% - 100%) on (O2)    Telemetry/Alarms:    Relevant labs, radiology and Medications reviewed                        11.8   4.28  )-----------( 144      ( 29 Jan 2025 08:00 )             37.1     01-29    133[L]  |  90[L]  |  15  ----------------------------<  94  3.9   |  42[H]  |  0.74    Ca    10.0      29 Jan 2025 08:00  Phos  2.1     01-29  Mg     1.7     01-29    TPro  6.1  /  Alb  3.0[L]  /  TBili  0.6  /  DBili  x   /  AST  22  /  ALT  15  /  AlkPhos  53  01-29      MEDICATIONS  (STANDING):  albumin human 25% IVPB 50 milliLiter(s) IV Intermittent every 12 hours  atorvastatin 80 milliGRAM(s) Oral at bedtime  chlorhexidine 4% Liquid 1 Application(s) Topical <User Schedule>  ciprofloxacin     Tablet 500 milliGRAM(s) Oral every 12 hours  dextrose 5%. 1000 milliLiter(s) (100 mL/Hr) IV Continuous <Continuous>  dextrose 5%. 1000 milliLiter(s) (50 mL/Hr) IV Continuous <Continuous>  dextrose 50% Injectable 25 Gram(s) IV Push once  dextrose 50% Injectable 12.5 Gram(s) IV Push once  dextrose 50% Injectable 25 Gram(s) IV Push once  doxazosin 4 milliGRAM(s) Oral at bedtime  enoxaparin Injectable 70 milliGRAM(s) SubCutaneous every 12 hours  fluticasone propionate/ salmeterol 100-50 MICROgram(s) Diskus 1 Dose(s) Inhalation two times a day  furosemide    Tablet 40 milliGRAM(s) Oral daily  glucagon  Injectable 1 milliGRAM(s) IntraMuscular once  insulin lispro (ADMELOG) corrective regimen sliding scale   SubCutaneous three times a day before meals  insulin lispro (ADMELOG) corrective regimen sliding scale   SubCutaneous at bedtime  metoprolol succinate ER 25 milliGRAM(s) Oral daily  mineral oil enema 133 milliLiter(s) Rectal daily  sulfaSALAzine 500 milliGRAM(s) Oral daily    MEDICATIONS  (PRN):  acetaminophen     Tablet .. 650 milliGRAM(s) Oral every 6 hours PRN Mild Pain (1 - 3)  acetaminophen   IVPB .. 1000 milliGRAM(s) IV Intermittent once PRN Mild Pain (1 - 3)  dextrose Oral Gel 15 Gram(s) Oral once PRN Blood Glucose LESS THAN 70 milliGRAM(s)/deciliter  melatonin 3 milliGRAM(s) Oral at bedtime PRN Insomnia  ondansetron Injectable 4 milliGRAM(s) IV Push every 6 hours PRN Nausea and/or Vomiting      Physical exam  Gen NAD  Neuro AAOx3,   Card RRR  Pulm decreased b/l bases  Abd soft  Ext warm, edema    I&O's Summary    28 Jan 2025 07:01  -  29 Jan 2025 07:00  --------------------------------------------------------  IN: 0 mL / OUT: 4150 mL / NET: -4150 mL        Assessment  89y Male  w/ PAST MEDICAL & SURGICAL HISTORY:  Hypertension      Cancer of rectum      RBBB (right bundle branch block)      Asthma      TIA (transient ischemic attack)      UTI (urinary tract infection)      Enlarged prostate      Urinary retention with incomplete bladder emptying      Spinal stenosis      Chronic GERD      Rectal tumor      S/P TURP      admitted with complaints of Patient is a 89y old  Male who presents with a chief complaint of hematuria   UTI   pleural effusions (29 Jan 2025 08:38)  .  On (Date), patient underwent Cardiac pacemaker interrogation    89 y/o M with a PMH of A-fib on Eliquis, GERD, hypertension, hyperlipidemia, CVA, diabetes, rectal CA with chronic diarrhea from enemas, atonic bladder with self-catheterizations, status post permanent pacemaker for bradycardia and syncope and persistent known moderate sized pericardial effusion presenting to  ED on 1/23/25 for suprapubic pain. He has a chronic Crump that was placed in [facility] on 12/24/24. Crump was functioning well until the morning of admission when the home health aide noted bright red blood in the bag. Patient also complaining of lower abdominal pain.    Since found to have pleural effusion on work up   recent history of note is patient had admission to [facility] in october requiring thoracentesis and pericardial centesis as per documentation with concern for tamponade at the time   since admitted in december with increased shortness of breath in ED with pleural effusion on CXR - IV diuretic given and patient sent home with outpatient follow up for which his diuretics where increased outpatient     consulted for pleural effusion also with pericardial effusion on CT scan   ECHO repeated today, moderate pericardial effusion, no tamponade, will follow w periodic ECHO's as per cardiology  continue diuresis as tolerated   bedside US of b/l chest reveals b/l moderate effusions L>R, d/w medical team, pt and dtr. At this time no drainage procedure as not curative, fluid will recur and pt without symptoms. Dtr would like to take a more medical approach (cont diuretics, low salt, limit water), no invasive procedures at this time  will hold off on Thoracic intervention   stable from a respiratory standpoint  care as per medical teams  will sign off, resume AC as per medicine  IS and wean O2 as able    Discussed with Cardiothoracic Team at AM rounds.

## 2025-01-29 NOTE — DISCHARGE NOTE NURSING/CASE MANAGEMENT/SOCIAL WORK - PATIENT PORTAL LINK FT
You can access the FollowMyHealth Patient Portal offered by [facility] by registering at the following website: [email or web address]. By joining [facility]’s FollowMyHealth portal, you will also be able to view your health information using other applications (apps) compatible with our system.

## 2025-01-29 NOTE — DISCHARGE NOTE PROVIDER - NSDCCPTREATMENT_GEN_ALL_CORE_FT
PRINCIPAL PROCEDURE  Procedure: Cardiac pacemaker interrogation  Findings and Treatment: PROCEDURE:   · Procedure Name  PPM Interrogation Note  · Procedure Date/Time  25-Jan-2025 14:08  · Informed Consent  Benefits, risks, and possible complications of procedure explained to patient/caregiver who verbalized understanding and gave verbal consent.  · Procedure Performed By  clincial rep from [employer]  · Procedure Assisted By  Myself  ·   [place]  · Model  Assurity MRI  · Mode  VVIR  · Rate  70bpm  · Right Ventricular Lead  Yes  · R-wave amplitude (mV)  6.5  · RV lead Impedance (ohms)  440  · Threshold (V)  0.75  · Threshold at (ms)  0.4  · Battery  Good  · Underlying Rhythm  AF, Vpaced  · Comments  Normal single chamber PPM; Vp89%; normal ppm function, no recorded arrhythmia        SECONDARY PROCEDURE  Procedure: 2D echo  Findings and Treatment:    1. Mild left ventricular hypertrophy.   2. Left ventricular cavity is normal in size. Left ventricular wall thickness is mildly increased. Left ventricular systolic function is normal with an ejection fraction visually estimated at 55 to 60 %.   3. Mildly enlarged right ventricular cavity size and normal right ventricular systolic function.   4. Left atrium is severely dilated.   5. Mild to moderate mitral regurgitation.   6. Moderate tricuspid regurgitation.   7. Estimated pulmonary artery systolic pressure is 75 mmHg, consistent with severe pulmonary hypertension.   8. The peak transaortic velocity is 2.32 m/s, peak transaortic gradient is 21.5 mmHg and mean transaortic gradient is 13.0 mmHg with an LVOT/aortic valve VTI ratio of 0.35. The effective orifice area is estimated at 1.10 cm² by the continuity equation and 1.44 cm² by 2D planimetry.   9. There is moderate calcification of the aortic valve leaflets. Moderate aortic stenosis.  10. Mild pulmonic regurgitation.  11. Moderate pericardial effusion with no echocardiographic evidence of tamponade physiology.  12. Moderate left pleural effusion noted.

## 2025-01-29 NOTE — PROGRESS NOTE ADULT - PROBLEM SELECTOR PROBLEM 3
Benign essential HTN
Benign essential HTN
BPH with obstruction/lower urinary tract symptoms
Benign essential HTN

## 2025-01-29 NOTE — PHYSICAL THERAPY INITIAL EVALUATION ADULT - ADDITIONAL COMMENTS
24/7 aides for all ADLs, minimal household ambulation with assistance and device, Pt completes STS transfers at home with 3 person assist. 24/7 aides for all ADLs, minimal household ambulation with assistance and device, Pt completes STS transfers at home with 3 person assist. PT can use UEs to assist with transfers

## 2025-01-29 NOTE — PROGRESS NOTE ADULT - PROBLEM SELECTOR PLAN 2
·  Problem: Pericardial effusion.   ·  Recommendation: pt; with recent pericardial effusion s/p pericardiocentesis on 10/24 at [facility] [place], CT shows pericardial effusion and cardiomegaly, Echo moderate effusion  which needs to monitor with periodic echo , as it recurred from  oct 2024 ,

## 2025-01-29 NOTE — DISCHARGE NOTE NURSING/CASE MANAGEMENT/SOCIAL WORK - NSDCPEFALRISK_GEN_ALL_CORE
For information on Fall & Injury Prevention, visit: [email or web address] OR  [email or web address] OR  https://www.cdc.gov/steadi/patient.html

## 2025-01-29 NOTE — PROGRESS NOTE ADULT - ASSESSMENT
89 y/o M with a PMH of A-fib on Eliquis, GERD, hypertension, hyperlipidemia, CVA, diabetes, rectal CA with chronic diarrhea from enemas, atonic bladder with self-catheterizations, status post permanent pacemaker for bradycardia and syncope and persistent known moderate sized pericardial effusion presenting to  ED on 1/23/25 for suprapubic pain.   noted with hyponatremia likely depletional  b/l pleural effusion   suprapubic pain with hematuria     REC  - urine osm, serum osm   - serum electrolytes  - with dec of na with lasix 40 iv started, will hold dose and fu the serum and urine  studies send   - CTS input noted: intervention planned,   - no need for FR, pt consuming minimal po intake  - inc intake encourage, refuses any  nutritional supplements   - CBI in progress   - on ctx fu ucx    1/25 SY  --Hyponatremia in the setting of CHF decompensation and anasarca      Will need to continue diuresis with an attempt to stabilize serum sodium.     Add SPA for more effective diuresis     Restart NACL tabs 1 gm BID.  --UTI/hematuria : CBI per ,  Continue Ceftriaxone 1 gm daily.  --Bladder : clot vs mass :  follow up.  D/w Dr Kelley    D/w family.    1/26 SY  --Hyponatremia in the setting of CHF decompensation and anasarca      Responding well to SPA/IV Lasix and salt tabs     Continue for another 2-3 days to optimize fluid and electrolytes prior to changing to home maintenance PO meds.  --UTI/hematuria : cleared, off CBI x 2 days. Abtx changed to po Cipro 500mg q 12 h.  --Bladder : clot vs mass :  follow up.  D/w Dr Kelley    1/27 SY  --Hyponatremia in the setting of CHF decompensation and anasarca      Continues to diurese well with SPA/IV Lasix and salt tabs     Continue for another 2-3 days to optimize fluid and electrolytes prior to changing to home maintenance PO meds.  --UTI/hematuria : cleared, off CBI x 3 days. Abtx changed to po Cipro 500mg q 12 h.  --Bladder : clot vs mass :  follow up appreciated : to follow up with [facility] [place]  post D/c  D/w Dr Kelley    1/28 MK   - hyponatremia hypervolumia     chf decomp with anasarca     tolerating spa and lasix with nacl tabs     significant response with current diuretics     monitor for worsening contraction alkalosis     cw nacl tab    aide advised  to continue with inc supplements   - UTI/hematuria     s/p cbi     cipro   - bladder mass vs clot as above    89 y/o M with a PMH of A-fib on Eliquis, GERD, hypertension, hyperlipidemia, CVA, diabetes, rectal CA with chronic diarrhea from enemas, atonic bladder with self-catheterizations, status post permanent pacemaker for bradycardia and syncope and persistent known moderate sized pericardial effusion presenting to  ED on 1/23/25 for suprapubic pain.   noted with hyponatremia likely depletional  b/l pleural effusion   suprapubic pain with hematuria     REC  - urine osm, serum osm   - serum electrolytes  - with dec of na with lasix 40 iv started, will hold dose and fu the serum and urine  studies send   - CTS input noted: intervention planned,   - no need for FR, pt consuming minimal po intake  - inc intake encourage, refuses any  nutritional supplements   - CBI in progress   - on ctx fu ucx    1/25 SY  --Hyponatremia in the setting of CHF decompensation and anasarca      Will need to continue diuresis with an attempt to stabilize serum sodium.     Add SPA for more effective diuresis     Restart NACL tabs 1 gm BID.  --UTI/hematuria : CBI per ,  Continue Ceftriaxone 1 gm daily.  --Bladder : clot vs mass :  follow up.  D/w Dr Kelley    D/w family.    1/26 SY  --Hyponatremia in the setting of CHF decompensation and anasarca      Responding well to SPA/IV Lasix and salt tabs     Continue for another 2-3 days to optimize fluid and electrolytes prior to changing to home maintenance PO meds.  --UTI/hematuria : cleared, off CBI x 2 days. Abtx changed to po Cipro 500mg q 12 h.  --Bladder : clot vs mass :  follow up.  D/w Dr Kelley    1/27 SY  --Hyponatremia in the setting of CHF decompensation and anasarca      Continues to diurese well with SPA/IV Lasix and salt tabs     Continue for another 2-3 days to optimize fluid and electrolytes prior to changing to home maintenance PO meds.  --UTI/hematuria : cleared, off CBI x 3 days. Abtx changed to po Cipro 500mg q 12 h.  --Bladder : clot vs mass :  follow up appreciated : to follow up with [facility] [place]  [place]/c  D/w Dr Kelley    1/28 MK   - hyponatremia hypervolumia     chf decomp with anasarca     tolerating spa and lasix with nacl tabs     significant response with current diuretics     monitor for worsening contraction alkalosis     cw nacl tab    aide advised  to continue with inc supplements   - UTI/hematuria     s/p cbi     cipro   - bladder mass vs clot as above    1/29 SY  --Hyponatremia in the setting of CHF decompensation and anasarca     responded  well to SPA/IV Lasix and salt tabs with signficant fluid weight loss.  --UTI/hematuria : cleared, off CBI x 3 days. Abtx changed to po Cipro 500mg q 12 h.  --Bladder : clot vs mass :  follow up appreciated : to follow up with [facility] [place]  [place] D/c  --OK for d/c from renal standpoint.  --D/w Dr Mandujano,  pt does not like NACL tabs.    D/c home on Lasix 40 mg daily and off NACL tabs with advice for increased food intake.

## 2025-01-29 NOTE — PROGRESS NOTE ADULT - SUBJECTIVE AND OBJECTIVE BOX
CHIEF COMPLAINT: Patient is a 89y old  Male who presents with a chief complaint of hematuria   UTI   pleural effusions (24 Jan 2025 15:50)      HPI:  Patient is an 89 y/o M with a PMH of A-fib on Eliquis, GERD, hypertension, hyperlipidemia, CVA, diabetes, rectal CA with chronic diarrhea from enemas, atonic bladder with self-catheterizations, status post permanent pacemaker for bradycardia and syncope and persistent known moderate sized pericardial effusion presenting to  ED on 1/23/25 for suprapubic pain.     He has a chronic Crump that was placed in [facility] on 12/24/24. Crump was functioning well until the morning of admission when the home health aide noted bright red blood in the bag. Patient also complaining of lower abdominal pain.  Patient is on daily Eliquis.  No fevers.  Normal p.o. intake.  No back pain.  Denies history of kidney stones.      Prior to having an indwelling Crump placed in December 2024 patient would self cath daily (6 times a day).       Upon arrival to the ED, vitals were /78 HR 82 RR 20 T97.1F and SpO2 of 96% on room air. Labs significant for Na 125, UA with large LE, positive nitrite, TNTC WBC TNTC RBC and moderate bacteria. CT abdomen and pelvis without contrast showed bilateral pleural effusions. Pericardial effusion. Cardiomegaly.Bilateral renal cysts, some of which with rim calcification. Right renal mass. Also bladder clot, cannot exclude mass. Recommend CT urogram if feasible. He received 1L NS bolus, IV Zofran 4mg x 1, IV Tylenol 1g x 1 and IV CFTX 1g x 1. Crump replaced in the ED and large amount of small clots removed. Subsequently, Crump draining clear fluid without issues.     Additional history obtained from home health aide present at bedside. She mentions he was admitted to [facility] in October 2024. There were bilateral pleural effusions. Patient was admitted to [facility] and started on IV diuretics. He then underwent pericardial window and thoracentesis. Post operative course complicated by abdominal pain and distention patient developed [place]'s syndrome. Patient was treated with the decompression colonoscopy.     Aide mentions he appears much more fluid overloaded than his baseline. Noticeable in his bilateral lower extremities, bilateral arms and scrotum. She also mentions he is bedbound but appears more fatigued and short of breath at times. Aide says she was instructed by patient's daughter to hold off on his home Lasix dose today because his cardiologist said his Na level is low. He usually takes furosemide 20 mg BID. Last dose of furosemide was Wednesday (1/22/24) AM.     Currently, patient is reporting some suprapubic pain. He received IV Tylenol in the ED. Family is very cautious about using opiates because of his history of constipation. He requires a daily enema in the AM to have a bowel movement.  (24 Jan 2025 01:02)    Cardiology consulted for HF (unknown EF), CT with pericardial effusion and B/L pleural effusion. Pt. with mild SOB, +2 B/L LE edema, Family at bedside - says his edema is much better at present, Echo performed,     1/25/25: Pt awake with aide at bedside. Denies cp or sob.    1/26/25: Pt denies cp or sob  1/27/25: feels better   1/28/25: Pt without cardiac complaints.  1/29/25: no complaints     Home Medications:  albuterol 2.5 mg/3 mL (0.083%) inhalation solution: 3 milliliter(s) by nebulizer 1 to 2 times a day (24 Jan 2025 16:45)  atorvastatin 80 mg oral tablet: 1 tab(s) orally once a day (at bedtime) (24 Jan 2025 16:42)  cefpodoxime 200 mg oral tablet: 1 tab(s) orally every 12 hours ***ON HOLD while at *** (24 Jan 2025 16:39)  CeleBREX 400 mg oral capsule: 1 cap(s) orally once a day (23 Jan 2025 22:53)  chlordiazepoxide-clidinium 5 mg-2.5 mg oral capsule: 1 cap(s) orally 2 times a day (24 Jan 2025 16:42)  clonazePAM 2 mg oral tablet: 1 tab(s) orally once a day as needed for  anxiety (24 Jan 2025 16:45)  Dulera 100 mcg-5 mcg/inh inhalation aerosol: 2 puff(s) inhaled 2 times a day (24 Jan 2025 16:42)  Eliquis 2.5 mg oral tablet: 1 tab(s) orally 2 times a day ***ON HOLD while at *** (24 Jan 2025 16:45)  Lantus Solostar Pen 100 units/mL subcutaneous solution: 20 unit(s) subcutaneous once a day as needed for BG &gt; 150 , for BG &lt; 150 HOLD dose (24 Jan 2025 16:40)  Lasix 20 mg oral tablet: 1 tab(s) orally 2 times a day ***ON HOLD since Monday 1/20*** (24 Jan 2025 16:41)  Linzess 145 mcg oral capsule: 1 cap(s) orally once a day (24 Jan 2025 16:45)  metoprolol succinate 25 mg oral tablet, extended release: 1 tab(s) orally once a day (24 Jan 2025 16:45)    MEDICATIONS  (STANDING):  albumin human 25% IVPB 50 milliLiter(s) IV Intermittent every 12 hours  atorvastatin 80 milliGRAM(s) Oral at bedtime  chlorhexidine 4% Liquid 1 Application(s) Topical <User Schedule>  ciprofloxacin     Tablet 500 milliGRAM(s) Oral every 12 hours  dextrose 5%. 1000 milliLiter(s) (100 mL/Hr) IV Continuous <Continuous>  dextrose 5%. 1000 milliLiter(s) (50 mL/Hr) IV Continuous <Continuous>  dextrose 50% Injectable 25 Gram(s) IV Push once  dextrose 50% Injectable 12.5 Gram(s) IV Push once  dextrose 50% Injectable 25 Gram(s) IV Push once  doxazosin 4 milliGRAM(s) Oral at bedtime  enoxaparin Injectable 70 milliGRAM(s) SubCutaneous every 12 hours  fluticasone propionate/ salmeterol 100-50 MICROgram(s) Diskus 1 Dose(s) Inhalation two times a day  furosemide    Tablet 40 milliGRAM(s) Oral daily  glucagon  Injectable 1 milliGRAM(s) IntraMuscular once  insulin lispro (ADMELOG) corrective regimen sliding scale   SubCutaneous three times a day before meals  insulin lispro (ADMELOG) corrective regimen sliding scale   SubCutaneous at bedtime  metoprolol succinate ER 25 milliGRAM(s) Oral daily  mineral oil enema 133 milliLiter(s) Rectal daily  sulfaSALAzine 500 milliGRAM(s) Oral daily      MEDICATIONS  (PRN):  acetaminophen     Tablet .. 650 milliGRAM(s) Oral every 6 hours PRN Mild Pain (1 - 3)  acetaminophen   IVPB .. 1000 milliGRAM(s) IV Intermittent once PRN Mild Pain (1 - 3)  dextrose Oral Gel 15 Gram(s) Oral once PRN Blood Glucose LESS THAN 70 milliGRAM(s)/deciliter  melatonin 3 milliGRAM(s) Oral at bedtime PRN Insomnia  ondansetron Injectable 4 milliGRAM(s) IV Push every 6 hours PRN Nausea and/or Vomiting    Vital Signs Last 24 Hrs  T(C): 36.7 (29 Jan 2025 08:57), Max: 36.8 (28 Jan 2025 23:24)  T(F): 98.1 (29 Jan 2025 08:57), Max: 98.2 (28 Jan 2025 23:24)  HR: 87 (29 Jan 2025 08:57) (78 - 87)  BP: 135/60 (29 Jan 2025 08:57) (127/62 - 135/60)  BP(mean): --  RR: 18 (29 Jan 2025 08:57) (18 - 18)  SpO2: 98% (29 Jan 2025 08:57) (98% - 100%)    Parameters below as of 29 Jan 2025 08:57  Patient On (Oxygen Delivery Method): nasal cannula  O2 Flow (L/min): 2      PHYSICAL EXAM:  Constitutional: NAD, awake and alert  HEENT:  EOMI,  Pupils round, No oral cyanosis.  Pulmonary: Non-labored, lungs diminished bilaterally  Cardiovascular: S1 and S2, regular rate and rhythm, no Murmurs, gallops or rubs  Gastrointestinal: Bowel Sounds present, soft, nontender.   Lymph: 1+ B/L LE edema  Neurological: Alert, no focal deficits  Skin: No rashes.  Psych:  Mood & affect appropriate    LABS: reviewed                           11.0   4.17  )-----------( 129      ( 28 Jan 2025 08:08 )             34.0                                      11.0   5.10  )-----------( 134      ( 27 Jan 2025 06:17 )             33.6     01-28    131[L]  |  88[L]  |  15  ----------------------------<  82  2.9[LL]   |  41[H]  |  0.78    Ca    9.5      28 Jan 2025 08:08  Phos  3.1     01-27  Mg     1.7     01-28    TPro  5.6[L]  /  Alb  2.6[L]  /  TBili  0.6  /  DBili  x   /  AST  19  /  ALT  12  /  AlkPhos  53  01-28 01-27    131[L]  |  92[L]  |  17  ----------------------------<  107[H]  3.3[L]   |  36[H]  |  0.81    Ca    9.5      27 Jan 2025 06:17  Phos  3.1     01-27  Mg     1.5     01-27    TPro  x   /  Alb  2.3[L]  /  TBili  x   /  DBili  x   /  AST  x   /  ALT  x   /  AlkPhos  x   01-27      Radiology/EKG/TTE: reviewed     < from: Xray Chest 1 View- PORTABLE-Urgent (Xray Chest 1 View- PORTABLE-Urgent .) (12.24.24 @ 16:00) >  IMPRESSION:  1. Cardiomegaly with unipolar pacemaker lead in the right ventricle,   central pulmonary venous congestion and bibasilar pleural effusions, left   greater than right, where there is also suspected left lower lobe   atelectasis. These findings are increased from the prior exam.    < end of copied text >      < from: CT Abdomen and Pelvis No Cont (01.23.25 @ 16:23) >    IMPRESSION:  Bilateral pleural effusions.    Pericardial effusion. Cardiomegaly.    Bilateral renal cysts, some of which with rim calcification. Right renal   mass. Also bladder clot, cannot exclude mass. Recommend CT urogram if   feasible.    Diverticulosis.    Extensive atherosclerosis.    < end of copied text >      < from: US Duplex Venous Upper Ext Ltd, Left (01.25.25 @ 10:46) >    IMPRESSION:  No evidence of left upper extremity deep venous thrombosis.    < end of copied text >      < from: US Duplex Venous Upper Ext Ltd, Left (01.25.25 @ 10:46) >  IMPRESSION:  No evidence of left upper extremity deep venous thrombosis.    < end of copied text >    < from: TTE W or WO Ultrasound Enhancing Agent (01.24.25 @ 14:11) >  CONCLUSIONS:      1. Mild left ventricular hypertrophy.   2. Left ventricular cavity is normal in size. Left ventricular wall thickness is mildly increased. Left ventricular systolic function isnormal with an ejection fraction visually estimated at 55 to 60 %.   3. Mildly enlarged right ventricular cavity size and normal right ventricular systolic function.   4. Left atrium is severely dilated.   5. Mild to moderate mitral regurgitation.   6. Moderate tricuspid regurgitation.   7. Estimated pulmonary artery systolic pressure is 75 mmHg, consistent with severe pulmonary hypertension.   8. The peak transaortic velocity is 2.32 m/s, peak transaortic gradient is 21.5 mmHg and mean transaortic gradient is 13.0 mmHg with an LVOT/aortic valve VTI ratio of 0.35. The effective orifice area is estimated at 1.10 cm² by the continuity equation and 1.44 cm² by 2D planimetry.   9. There is moderate calcification of the aortic valve leaflets. Moderate aortic stenosis.  10. Mild pulmonic regurgitation.  11. Moderate pericardial effusion with no echocardiographic evidence of tamponade physiology.  12. Moderate left pleural effusion noted.    ________________________________________________________________________________________    < end of copied text >     CHIEF COMPLAINT: Patient is a 89y old  Male who presents with a chief complaint of hematuria   UTI   pleural effusions (24 Jan 2025 15:50)      HPI:  Patient is an 89 y/o M with a PMH of A-fib on Eliquis, GERD, hypertension, hyperlipidemia, CVA, diabetes, rectal CA with chronic diarrhea from enemas, atonic bladder with self-catheterizations, status post permanent pacemaker for bradycardia and syncope and persistent known moderate sized pericardial effusion presenting to  ED on 1/23/25 for suprapubic pain.     He has a chronic Crump that was placed in [facility] on 12/24/24. Crump was functioning well until the morning of admission when the home health aide noted bright red blood in the bag. Patient also complaining of lower abdominal pain.  Patient is on daily Eliquis.  No fevers.  Normal p.o. intake.  No back pain.  Denies history of kidney stones.      Prior to having an indwelling Crump placed in December 2024 patient would self cath daily (6 times a day).       Upon arrival to the ED, vitals were /78 HR 82 RR 20 T97.1F and SpO2 of 96% on room air. Labs significant for Na 125, UA with large LE, positive nitrite, TNTC WBC TNTC RBC and moderate bacteria. CT abdomen and pelvis without contrast showed bilateral pleural effusions. Pericardial effusion. Cardiomegaly.Bilateral renal cysts, some of which with rim calcification. Right renal mass. Also bladder clot, cannot exclude mass. Recommend CT urogram if feasible. He received 1L NS bolus, IV Zofran 4mg x 1, IV Tylenol 1g x 1 and IV CFTX 1g x 1. Crump replaced in the ED and large amount of small clots removed. Subsequently, Crump draining clear fluid without issues.     Additional history obtained from home health aide present at bedside. She mentions he was admitted to [facility] in October 2024. There were bilateral pleural effusions. Patient was admitted to [facility] and started on IV diuretics. He then underwent pericardial window and thoracentesis. Post operative course complicated by abdominal pain and distention patient developed [place]'s syndrome. Patient was treated with the decompression colonoscopy.     Aide mentions he appears much more fluid overloaded than his baseline. Noticeable in his bilateral lower extremities, bilateral arms and scrotum. She also mentions he is bedbound but appears more fatigued and short of breath at times. Aide says she was instructed by patient's daughter to hold off on his home Lasix dose today because his cardiologist said his Na level is low. He usually takes furosemide 20 mg BID. Last dose of furosemide was Wednesday (1/22/24) AM.     Currently, patient is reporting some suprapubic pain. He received IV Tylenol in the ED. Family is very cautious about using opiates because of his history of constipation. He requires a daily enema in the AM to have a bowel movement.  (24 Jan 2025 01:02)    Cardiology consulted for HF (unknown EF), CT with pericardial effusion and B/L pleural effusion. Pt. with mild SOB, +2 B/L LE edema, Family at bedside - says his edema is much better at present, Echo performed,     1/25/25: Pt awake with aide at bedside. Denies cp or sob.    1/26/25: Pt denies cp or sob  1/27/25: feels better   1/28/25: Pt without cardiac complaints.  1/29/25: no complaints     Home Medications:  albuterol 2.5 mg/3 mL (0.083%) inhalation solution: 3 milliliter(s) by nebulizer 1 to 2 times a day (24 Jan 2025 16:45)  atorvastatin 80 mg oral tablet: 1 tab(s) orally once a day (at bedtime) (24 Jan 2025 16:42)  cefpodoxime 200 mg oral tablet: 1 tab(s) orally every 12 hours ***ON HOLD while at *** (24 Jan 2025 16:39)  CeleBREX 400 mg oral capsule: 1 cap(s) orally once a day (23 Jan 2025 22:53)  chlordiazepoxide-clidinium 5 mg-2.5 mg oral capsule: 1 cap(s) orally 2 times a day (24 Jan 2025 16:42)  clonazePAM 2 mg oral tablet: 1 tab(s) orally once a day as needed for  anxiety (24 Jan 2025 16:45)  Dulera 100 mcg-5 mcg/inh inhalation aerosol: 2 puff(s) inhaled 2 times a day (24 Jan 2025 16:42)  Eliquis 2.5 mg oral tablet: 1 tab(s) orally 2 times a day ***ON HOLD while at *** (24 Jan 2025 16:45)  Lantus Solostar Pen 100 units/mL subcutaneous solution: 20 unit(s) subcutaneous once a day as needed for BG &gt; 150 , for BG &lt; 150 HOLD dose (24 Jan 2025 16:40)  Lasix 20 mg oral tablet: 1 tab(s) orally 2 times a day ***ON HOLD since Monday 1/20*** (24 Jan 2025 16:41)  Linzess 145 mcg oral capsule: 1 cap(s) orally once a day (24 Jan 2025 16:45)  metoprolol succinate 25 mg oral tablet, extended release: 1 tab(s) orally once a day (24 Jan 2025 16:45)    MEDICATIONS  (STANDING):  albumin human 25% IVPB 50 milliLiter(s) IV Intermittent every 12 hours  atorvastatin 80 milliGRAM(s) Oral at bedtime  chlorhexidine 4% Liquid 1 Application(s) Topical <User Schedule>  ciprofloxacin     Tablet 500 milliGRAM(s) Oral every 12 hours  dextrose 5%. 1000 milliLiter(s) (100 mL/Hr) IV Continuous <Continuous>  dextrose 5%. 1000 milliLiter(s) (50 mL/Hr) IV Continuous <Continuous>  dextrose 50% Injectable 25 Gram(s) IV Push once  dextrose 50% Injectable 12.5 Gram(s) IV Push once  dextrose 50% Injectable 25 Gram(s) IV Push once  doxazosin 4 milliGRAM(s) Oral at bedtime  enoxaparin Injectable 70 milliGRAM(s) SubCutaneous every 12 hours  fluticasone propionate/ salmeterol 100-50 MICROgram(s) Diskus 1 Dose(s) Inhalation two times a day  furosemide    Tablet 40 milliGRAM(s) Oral daily  glucagon  Injectable 1 milliGRAM(s) IntraMuscular once  insulin lispro (ADMELOG) corrective regimen sliding scale   SubCutaneous three times a day before meals  insulin lispro (ADMELOG) corrective regimen sliding scale   SubCutaneous at bedtime  metoprolol succinate ER 25 milliGRAM(s) Oral daily  mineral oil enema 133 milliLiter(s) Rectal daily  sulfaSALAzine 500 milliGRAM(s) Oral daily      MEDICATIONS  (PRN):  acetaminophen     Tablet .. 650 milliGRAM(s) Oral every 6 hours PRN Mild Pain (1 - 3)  acetaminophen   IVPB .. 1000 milliGRAM(s) IV Intermittent once PRN Mild Pain (1 - 3)  dextrose Oral Gel 15 Gram(s) Oral once PRN Blood Glucose LESS THAN 70 milliGRAM(s)/deciliter  melatonin 3 milliGRAM(s) Oral at bedtime PRN Insomnia  ondansetron Injectable 4 milliGRAM(s) IV Push every 6 hours PRN Nausea and/or Vomiting    Vital Signs Last 24 Hrs  T(C): 36.7 (29 Jan 2025 08:57), Max: 36.8 (28 Jan 2025 23:24)  T(F): 98.1 (29 Jan 2025 08:57), Max: 98.2 (28 Jan 2025 23:24)  HR: 87 (29 Jan 2025 08:57) (78 - 87)  BP: 135/60 (29 Jan 2025 08:57) (127/62 - 135/60)  BP(mean): --  RR: 18 (29 Jan 2025 08:57) (18 - 18)  SpO2: 98% (29 Jan 2025 08:57) (98% - 100%)    Parameters below as of 29 Jan 2025 08:57  Patient On (Oxygen Delivery Method): nasal cannula  O2 Flow (L/min): 2      PHYSICAL EXAM:  Constitutional: NAD, awake and alert  HEENT:  EOMI,  Pupils round, No oral cyanosis.  Pulmonary: Non-labored, lungs diminished bilaterally  Cardiovascular: S1 and S2, regular rate and rhythm, no Murmurs, gallops or rubs  Gastrointestinal: Bowel Sounds present, soft, nontender.   Lymph: 1+ B/L LE edema  Neurological: Alert, no focal deficits  Skin: No rashes.  Psych:  Mood & affect appropriate    LABS: reviewed                           11.0   4.17  )-----------( 129      ( 28 Jan 2025 08:08 )             34.0                                      11.0   5.10  )-----------( 134      ( 27 Jan 2025 06:17 )             33.6     01-28    131[L]  |  88[L]  |  15  ----------------------------<  82  2.9[LL]   |  41[H]  |  0.78    Ca    9.5      28 Jan 2025 08:08  Phos  3.1     01-27  Mg     1.7     01-28    TPro  5.6[L]  /  Alb  2.6[L]  /  TBili  0.6  /  DBili  x   /  AST  19  /  ALT  12  /  AlkPhos  53  01-28 01-27    131[L]  |  92[L]  |  17  ----------------------------<  107[H]  3.3[L]   |  36[H]  |  0.81    Ca    9.5      27 Jan 2025 06:17  Phos  3.1     01-27  Mg     1.5     01-27    TPro  x   /  Alb  2.3[L]  /  TBili  x   /  DBili  x   /  AST  x   /  ALT  x   /  AlkPhos  x   01-27      Radiology/EKG/TTE: reviewed     < from: TTE Limited W or WO Ultrasound Enhancing Agent (01.29.25 @ 08:06) >  CONCLUSIONS:      1. Left ventricular systolic function is normal with an ejection fraction of 66 % by Le's method of disks.   2. Bilateral pleural effusion noted.   3. Moderate pericardial effusion noted adjacent to the right ventricle and trace pericardial effusion noted adjacent to the posterolateral left ventricle with no echocardiographic evidence of tamponade physiology: no early diastolic inversion of the right ventricle, respiratory variation across the mitral valve E wave is not greater than 30%, respiratory variation across the tricuspid valve E wave is not greater than 60%, no significant inspiratory decrease of the mitral inflow velocity and inferior vena cava is plethoricwith blunted respiratory variation.    __________________________________________________________________________________    < end of copied text >  < from: TTE W or WO Ultrasound Enhancing Agent (01.24.25 @ 14:11) >  _______________________________________________________________________________________     CONCLUSIONS:      1. Mild left ventricular hypertrophy.   2. Left ventricular cavity is normal in size. Left ventricular wall thickness is mildly increased. Left ventricular systolic function isnormal with an ejection fraction visually estimated at 55 to 60 %.   3. Mildly enlarged right ventricular cavity size and normal right ventricular systolic function.   4. Left atrium is severely dilated.   5. Mild to moderate mitral regurgitation.   6. Moderate tricuspid regurgitation.   7. Estimated pulmonary artery systolic pressure is 75 mmHg, consistent with severe pulmonary hypertension.   8. The peak transaortic velocity is 2.32 m/s, peak transaortic gradient is 21.5 mmHg and mean transaortic gradient is 13.0 mmHg with an LVOT/aortic valve VTI ratio of 0.35. The effective orifice area is estimated at 1.10 cm² by the continuity equation and 1.44 cm² by 2D planimetry.   9. There is moderate calcification of the aortic valve leaflets. Moderate aortic stenosis.  10. Mild pulmonic regurgitation.  11. Moderate pericardial effusion with no echocardiographic evidence of tamponade physiology.  12. Moderate left pleural effusion noted.    ________________________________________________________________________________________    < end of copied text >      < from: Xray Chest 1 View- PORTABLE-Urgent (Xray Chest 1 View- PORTABLE-Urgent .) (12.24.24 @ 16:00) >  IMPRESSION:  1. Cardiomegaly with unipolar pacemaker lead in the right ventricle,   central pulmonary venous congestion and bibasilar pleural effusions, left   greater than right, where there is also suspected left lower lobe   atelectasis. These findings are increased from the prior exam.    < end of copied text >      < from: CT Abdomen and Pelvis No Cont (01.23.25 @ 16:23) >    IMPRESSION:  Bilateral pleural effusions.    Pericardial effusion. Cardiomegaly.    Bilateral renal cysts, some of which with rim calcification. Right renal   mass. Also bladder clot, cannot exclude mass. Recommend CT urogram if   feasible.    Diverticulosis.    Extensive atherosclerosis.    < end of copied text >      < from: US Duplex Venous Upper Ext Ltd, Left (01.25.25 @ 10:46) >    IMPRESSION:  No evidence of left upper extremity deep venous thrombosis.    < end of copied text >      < from: US Duplex Venous Upper Ext Ltd, Left (01.25.25 @ 10:46) >  IMPRESSION:  No evidence of left upper extremity deep venous thrombosis.    < end of copied text >    < from: TTE W or WO Ultrasound Enhancing Agent (01.24.25 @ 14:11) >  CONCLUSIONS:      1. Mild left ventricular hypertrophy.   2. Left ventricular cavity is normal in size. Left ventricular wall thickness is mildly increased. Left ventricular systolic function isnormal with an ejection fraction visually estimated at 55 to 60 %.   3. Mildly enlarged right ventricular cavity size and normal right ventricular systolic function.   4. Left atrium is severely dilated.   5. Mild to moderate mitral regurgitation.   6. Moderate tricuspid regurgitation.   7. Estimated pulmonary artery systolic pressure is 75 mmHg, consistent with severe pulmonary hypertension.   8. The peak transaortic velocity is 2.32 m/s, peak transaortic gradient is 21.5 mmHg and mean transaortic gradient is 13.0 mmHg with an LVOT/aortic valve VTI ratio of 0.35. The effective orifice area is estimated at 1.10 cm² by the continuity equation and 1.44 cm² by 2D planimetry.   9. There is moderate calcification of the aortic valve leaflets. Moderate aortic stenosis.  10. Mild pulmonic regurgitation.  11. Moderate pericardial effusion with no echocardiographic evidence of tamponade physiology.  12. Moderate left pleural effusion noted.    ________________________________________________________________________________________    < end of copied text >

## 2025-01-29 NOTE — DISCHARGE NOTE PROVIDER - NSDCMRMEDTOKEN_GEN_ALL_CORE_FT
albuterol 2.5 mg/3 mL (0.083%) inhalation solution: 3 milliliter(s) by nebulizer 1 to 2 times a day  atorvastatin 80 mg oral tablet: 1 tab(s) orally once a day (at bedtime)  chlordiazepoxide-clidinium 5 mg-2.5 mg oral capsule: 1 cap(s) orally 2 times a day  ciprofloxacin 500 mg oral tablet: 1 tab(s) orally every 12 hours  clonazePAM 2 mg oral tablet: 1 tab(s) orally once a day as needed for  anxiety  Dulera 100 mcg-5 mcg/inh inhalation aerosol: 2 puff(s) inhaled 2 times a day  Eliquis 2.5 mg oral tablet: 1 tab(s) orally 2 times a day  Flomax 0.4 mg oral capsule: 1 cap(s) orally once a day (at bedtime)  furosemide 40 mg oral tablet: 1 tab(s) orally once a day  Linzess 145 mcg oral capsule: 1 cap(s) orally once a day  magnesium glycinate 200 mg oral tablet: 1 tab(s) orally once a day  metoprolol succinate 25 mg oral tablet, extended release: 1 tab(s) orally once a day  mineral oil rectal enema: 133 milliliter(s) rectal once a day  potassium chloride 8 mEq (600 mg) oral capsule, extended release: 1 cap(s) orally every other day ***ON HOLD since Monday 1/20***  PriLOSEC OTC 20 mg oral delayed release tablet: 1 tab(s) orally 2 times a day- OTC  sulfaSALAzine 500 mg oral tablet: 1 tab(s) orally every other day  Vitamin D3 50,000 intl units oral capsule: 1 cap(s) orally every 2 weeks

## 2025-01-29 NOTE — DISCHARGE NOTE NURSING/CASE MANAGEMENT/SOCIAL WORK - FINANCIAL ASSISTANCE
[facility] provides services at a reduced cost to those who are determined to be eligible through [facility]’s financial assistance program. Information regarding [facility]’s financial assistance program can be found by going to [email or web address] or by calling [phone].

## 2025-01-29 NOTE — DISCHARGE NOTE PROVIDER - HOSPITAL COURSE
Chart and meds reviewed.  Patient seen and examined.    CC: Hematuria  ·HPI : 89 y/o M with a PMH of A-fib on Eliquis, GERD, hypertension, hyperlipidemia, CVA, diabetes, rectal CA with chronic diarrhea from enemas, atonic bladder with self-catheterizations, status post permanent pacemaker for bradycardia and syncope and persistent known moderate sized pericardial effusion presenting to  ED on 1/23/25 for suprapubic pain. He has a chronic Robertson that was placed in [facility] on 12/24/24. Robertson was functioning well until the morning of admission when the home health aide noted bright red blood in the bag. Patient also complaining of lower abdominal pain.  Patient is on daily Eliquis.  No fevers.  Normal p.o. intake.  No back pain.  Denies history of kidney stones. Prior to having an indwelling Robertson placed in December 2024 patient would self cath daily (6 times a day).    1/28 - + gen weakness, + leg edema better , Feels better, no chest pain or sob. No Fever, denies cp, dyspnea, abdominal pain  1/29 - denies cp, dyspnea ,  tolerating diet, denies cough, abdominal pain, palpitations, off oxygen sats 90-92%  All other systems reviewed and found to be negative with the exception of what has been described above.  Vital sings reviewed for last 24 h  T(C): 36.7 (01-29-25 @ 15:13), Max: 36.8 (01-28-25 @ 23:24)  T(F): 98.1 (01-29-25 @ 15:13), Max: 98.2 (01-28-25 @ 23:24)  HR: 81 (01-29-25 @ 15:13) (78 - 87)  BP: 136/65 (01-29-25 @ 15:13) (127/62 - 136/65)  RR: 18 (01-29-25 @ 15:13) (18 - 18)  SpO2: 92% (01-29-25 @ 15:13) (92% - 100%)  Physical exam :   GEN: Flat affect, NAD  HEENT:  pupils equal and reactive, EOMI, no oropharyngeal lesions, erythema, exudates, oral thrush  NECK:   supple, no carotid bruits  CV:  +S1, +S2, regular, no murmurs  RESP:   lungs clear to auscultation bilaterally, no wheezing, rales, rhonchi, good air entry bilaterally  GI:  abdomen soft, non-tender, non-distended, normal BS  EXT:  no clubbing, no cyanosis, 2 + edema, no calf pain, swelling or erythema. LUE swelling  NEURO:  AAOX3, no focal neurological deficits, follows all commands, able to move extremities spontaneously  SKIN:  no ulcers, lesions or rashes  : Robertson   LABS: all reviewed     A/P   #Hematuria s/p Robertson , MDRO UTI   # Bacteriuria with PSAE-CRE and ENFA due to UTI   # Atonic bladder with urinary retention and Robertson catheter, Bilateral renal cysts  - Robertson changed in the ED - now without blood appreciated   - Hgb stable  ,  Abnormal UA  - SP rocephin--> PO cipro 500 q12h for 5 more days  - neg blood cultures   - Urine growing MDRO pseud and E Faecalis  - ID consult appreciated   - F/u urology consult discharged with indwelling robertson Patient can follow up with [facility]rologist at [facility] for further management  - CT urogram negative for bladder mass  - restart lovenox 1mg/kg q 12 --> transition to eliquis at home  #Moderate to large bilateral pleural effusions  # Likely  Acute on chronic HFpEF   # Pericardial effusion   # Valvular heart disease - moderate  TR, mild to moderate MR , moderate AS   - As per CT abdomen and pelvis ,  Thoracic on board - no interventions   - BNP elevated 2800--> 2500   - Echo reviewed  EF 55% moderate  TR, mild to moderate MR , moderate AS   - Lasix IV BID 40 --> po 40 qd with 8 meq K CL   - CXR 1/28 - stable effusions  - repeat echo - still moderate pericardial effusion   - Albumin BID  - Moderate pericardial effusion SP pericardial window 10/24  - Cardiology consult appreciated  #Hyponatremia ,  hypervolemic hyponatremia   - He received 1L NS bolus in the ED  -  --> 133   - Continue lasix  po   - F/U urine/serum studies   - Nephrology consult appreciated  - NACL 1 gm qd   - s/p albumin   # Hypokalemia, low bordeline Mg   - Replete   # LUE swelling   - Doppler negative  #Paroxysmal Afib   -  Lovenox 1mg/kg q 12 80--> 70 bid adjusted for weight   - Continue metoprolol succinate 25 mg QD   - restart home dose eliquis 2.5 bid   # History of UC - Continue sulfasalazine ( can cause fluid retention)   # BPH s/ p  robertson -  stop Cardura , use flomax hs for BPH, stop myrbetriq   #h/o T2DM  with A1C 5.5 , no longer   - Continue lantus - stop 20 units hs  - stop all insuline , goal of A1C 7-8 in this age category   - liberize diet   #DVT ppx  - Lovenox . SCDs    Daughter Lilibeth [phone] updated     Final diagnosis, treatment plan, and follow-up recommendations were discussed and explained to the patient.   The patient was given an opportunity to ask questions concerning the diagnosis and treatment plan.   The patient acknowledged understanding of the diagnosis, treatment, and follow-up recommendations.   The patient was advised to seek urgent care upon discharge if worsening symptoms develop prior to scheduled follow-up.   Time spent on discharge included time with the patient, and also coordinating discharge care as outlined below.  Discharge note faxed to PCP with my contact information to call me back   PCP Dr. Irving   Total time spent: 50 min

## 2025-01-29 NOTE — PROGRESS NOTE ADULT - PROBLEM SELECTOR PLAN 1
·  Problem: HF (heart failure).   ·  Recommendation: chronic HFpEF with recent pericardial effusion s/.p pericardiocentesis and pleural effusion s/p thoracentesis at [facility] in 10/24, now with pericardial and B/L pleural effusion left>right   Recommendation: Echo performed, NL LVF, EF 55-60%, mild-Mod MR, Mod TR, severe pulmonary HTN.    . cont diuresis with lasix - changed to po, keep K>4  .  CT Sx consult appreciated - Nothing to do in acute setting    pt is stable from cardiac standpoint, will followup op with Dr. Domingo, will sign off ·  Problem: HF (heart failure).   ·  Recommendation: chronic HFpEF with recent pericardial effusion s/.p pericardiocentesis and pleural effusion s/p thoracentesis at [facility] in 10/24, now with pericardial and B/L pleural effusion left>right   Recommendation: Echo performed, NL LVF, EF 55-60%, mild-Mod MR, Mod TR, severe pulmonary HTN.    Repeat Echo - B/L pleural effusion, moderate pericardial effusion, no evidence of tamponade  . cont diuresis with lasix - changed to po, keep K>4  .  CT Sx consult appreciated - Nothing to do in acute setting    pt is stable from cardiac standpoint, will followup op with Dr. Domingo, will sign off

## 2025-01-29 NOTE — DISCHARGE NOTE PROVIDER - NSDCCPCAREPLAN_GEN_ALL_CORE_FT
PRINCIPAL DISCHARGE DIAGNOSIS  Diagnosis: Hematuria  Assessment and Plan of Treatment: restart eliquis 2.5 bid , monitor for bleeding, follow up with primary urologist within 1 week for further care and Crump care   jaci Crump once a month      SECONDARY DISCHARGE DIAGNOSES  Diagnosis: HF (heart failure)  Assessment and Plan of Treatment: you have bilateral pleural effusions moderate in size, take lasix 40 mg daily , with potassium and magnesium supplements   follow up with your cardiologist within 1 weeks    drink no more than 1.5 L water a day    Diagnosis: Pericardial effusion  Assessment and Plan of Treatment: moderate size , follow up with cardiologist for further care wiythin 1 week    Diagnosis: Hyponatremia  Assessment and Plan of Treatment: take sodium chloride tabs once a day, repeat blood work in 1 week    Diagnosis: BPH with obstruction/lower urinary tract symptoms  Assessment and Plan of Treatment: CT urogram  was done  stop taking terazosin  ( causing leg edema) and myrbetryc ( no indications)   take flomax once at bedtime  follow up with urologist within 1 week    Diagnosis: Cardiac pacemaker  Assessment and Plan of Treatment: your ICD was interogated duering this admission    Diagnosis: Acute UTI  Assessment and Plan of Treatment: complete 5 more days of antibiotics, return to ED if fever, chills, or other concerns    Diagnosis: Ulcerative colitis  Assessment and Plan of Treatment: consider stopping sulfazalasine  - can cause fluid retention as well, consider tapering or stoping as per promary gastroeneterologist    Diagnosis: Diabetes mellitus  Assessment and Plan of Treatment: overcontrolled with A1C 5.5 and low blood glucose of 80-90  stop insulin, follow  diet , repeat A1C in 2-3 month

## 2025-01-30 LAB — GLUCOSE BLDC GLUCOMTR-MCNC: 101 MG/DL — HIGH (ref 70–99)

## 2025-02-11 DIAGNOSIS — N40.1 BENIGN PROSTATIC HYPERPLASIA WITH LOWER URINARY TRACT SYMPTOMS: ICD-10-CM

## 2025-02-11 DIAGNOSIS — N28.1 CYST OF KIDNEY, ACQUIRED: ICD-10-CM

## 2025-02-11 DIAGNOSIS — E83.42 HYPOMAGNESEMIA: ICD-10-CM

## 2025-02-11 DIAGNOSIS — E87.1 HYPO-OSMOLALITY AND HYPONATREMIA: ICD-10-CM

## 2025-02-11 DIAGNOSIS — N39.0 URINARY TRACT INFECTION, SITE NOT SPECIFIED: ICD-10-CM

## 2025-02-11 DIAGNOSIS — E78.5 HYPERLIPIDEMIA, UNSPECIFIED: ICD-10-CM

## 2025-02-11 DIAGNOSIS — R33.8 OTHER RETENTION OF URINE: ICD-10-CM

## 2025-02-11 DIAGNOSIS — Z79.01 LONG TERM (CURRENT) USE OF ANTICOAGULANTS: ICD-10-CM

## 2025-02-11 DIAGNOSIS — K21.9 GASTRO-ESOPHAGEAL REFLUX DISEASE WITHOUT ESOPHAGITIS: ICD-10-CM

## 2025-02-11 DIAGNOSIS — Z79.899 OTHER LONG TERM (CURRENT) DRUG THERAPY: ICD-10-CM

## 2025-02-11 DIAGNOSIS — Z79.4 LONG TERM (CURRENT) USE OF INSULIN: ICD-10-CM

## 2025-02-11 DIAGNOSIS — E11.9 TYPE 2 DIABETES MELLITUS WITHOUT COMPLICATIONS: ICD-10-CM

## 2025-02-11 DIAGNOSIS — E87.6 HYPOKALEMIA: ICD-10-CM

## 2025-02-11 DIAGNOSIS — B96.5 PSEUDOMONAS (AERUGINOSA) (MALLEI) (PSEUDOMALLEI) AS THE CAUSE OF DISEASES CLASSIFIED ELSEWHERE: ICD-10-CM

## 2025-02-11 DIAGNOSIS — N31.2 FLACCID NEUROPATHIC BLADDER, NOT ELSEWHERE CLASSIFIED: ICD-10-CM

## 2025-02-11 DIAGNOSIS — K51.90 ULCERATIVE COLITIS, UNSPECIFIED, WITHOUT COMPLICATIONS: ICD-10-CM

## 2025-02-11 DIAGNOSIS — E43 UNSPECIFIED SEVERE PROTEIN-CALORIE MALNUTRITION: ICD-10-CM

## 2025-02-11 DIAGNOSIS — I50.33 ACUTE ON CHRONIC DIASTOLIC (CONGESTIVE) HEART FAILURE: ICD-10-CM

## 2025-02-11 DIAGNOSIS — Z85.048 PERSONAL HISTORY OF OTHER MALIGNANT NEOPLASM OF RECTUM, RECTOSIGMOID JUNCTION, AND ANUS: ICD-10-CM

## 2025-02-11 DIAGNOSIS — K52.9 NONINFECTIVE GASTROENTERITIS AND COLITIS, UNSPECIFIED: ICD-10-CM

## 2025-02-11 DIAGNOSIS — Z86.73 PERSONAL HISTORY OF TRANSIENT ISCHEMIC ATTACK (TIA), AND CEREBRAL INFARCTION WITHOUT RESIDUAL DEFICITS: ICD-10-CM

## 2025-02-11 DIAGNOSIS — I08.3 COMBINED RHEUMATIC DISORDERS OF MITRAL, AORTIC AND TRICUSPID VALVES: ICD-10-CM

## 2025-02-11 DIAGNOSIS — J45.909 UNSPECIFIED ASTHMA, UNCOMPLICATED: ICD-10-CM

## 2025-02-11 DIAGNOSIS — I11.0 HYPERTENSIVE HEART DISEASE WITH HEART FAILURE: ICD-10-CM

## 2025-02-11 DIAGNOSIS — Z66 DO NOT RESUSCITATE: ICD-10-CM

## 2025-02-11 DIAGNOSIS — I48.91 UNSPECIFIED ATRIAL FIBRILLATION: ICD-10-CM

## 2025-02-11 DIAGNOSIS — N13.8 OTHER OBSTRUCTIVE AND REFLUX UROPATHY: ICD-10-CM

## 2025-02-11 DIAGNOSIS — Z95.0 PRESENCE OF CARDIAC PACEMAKER: ICD-10-CM

## 2025-02-14 ENCOUNTER — APPOINTMENT (OUTPATIENT)
Dept: HOME HEALTH SERVICES | Facility: HOME HEALTH | Age: 89
End: 2025-02-14
Payer: MEDICARE

## 2025-02-14 VITALS
DIASTOLIC BLOOD PRESSURE: 88 MMHG | SYSTOLIC BLOOD PRESSURE: 158 MMHG | OXYGEN SATURATION: 92 % | HEART RATE: 74 BPM | RESPIRATION RATE: 22 BRPM

## 2025-02-14 DIAGNOSIS — K51.90 ULCERATIVE COLITIS, UNSPECIFIED, W/OUT COMPLICATIONS: ICD-10-CM

## 2025-02-14 DIAGNOSIS — E78.5 HYPERLIPIDEMIA, UNSPECIFIED: ICD-10-CM

## 2025-02-14 DIAGNOSIS — E87.1 HYPO-OSMOLALITY AND HYPONATREMIA: ICD-10-CM

## 2025-02-14 DIAGNOSIS — E11.9 TYPE 2 DIABETES MELLITUS W/OUT COMPLICATIONS: ICD-10-CM

## 2025-02-14 DIAGNOSIS — N40.1 BENIGN PROSTATIC HYPERPLASIA WITH LOWER URINARY TRACT SYMPMS: ICD-10-CM

## 2025-02-14 DIAGNOSIS — D68.69 OTHER THROMBOPHILIA: ICD-10-CM

## 2025-02-14 DIAGNOSIS — J44.1 CHRONIC OBSTRUCTIVE PULMONARY DISEASE WITH (ACUTE) EXACERBATION: ICD-10-CM

## 2025-02-14 DIAGNOSIS — E55.9 VITAMIN D DEFICIENCY, UNSPECIFIED: ICD-10-CM

## 2025-02-14 DIAGNOSIS — I50.32 CHRONIC DIASTOLIC (CONGESTIVE) HEART FAILURE: ICD-10-CM

## 2025-02-14 DIAGNOSIS — D69.6 THROMBOCYTOPENIA, UNSPECIFIED: ICD-10-CM

## 2025-02-14 DIAGNOSIS — I48.0 PAROXYSMAL ATRIAL FIBRILLATION: ICD-10-CM

## 2025-02-14 DIAGNOSIS — I31.39 OTHER PERICARDIAL EFFUSION (NONINFLAMMATORY): ICD-10-CM

## 2025-02-14 DIAGNOSIS — Z71.89 OTHER SPECIFIED COUNSELING: ICD-10-CM

## 2025-02-14 DIAGNOSIS — N13.8 BENIGN PROSTATIC HYPERPLASIA WITH LOWER URINARY TRACT SYMPMS: ICD-10-CM

## 2025-02-14 DIAGNOSIS — Z97.8 PRESENCE OF OTHER SPECIFIED DEVICES: ICD-10-CM

## 2025-02-14 DIAGNOSIS — Z60.2 PROBLEMS RELATED TO LIVING ALONE: ICD-10-CM

## 2025-02-14 DIAGNOSIS — K59.00 CONSTIPATION, UNSPECIFIED: ICD-10-CM

## 2025-02-14 DIAGNOSIS — I27.20 PULMONARY HYPERTENSION, UNSPECIFIED: ICD-10-CM

## 2025-02-14 DIAGNOSIS — Z87.891 PERSONAL HISTORY OF NICOTINE DEPENDENCE: ICD-10-CM

## 2025-02-14 PROCEDURE — 99497 ADVNCD CARE PLAN 30 MIN: CPT

## 2025-02-14 PROCEDURE — 99344 HOME/RES VST NEW MOD MDM 60: CPT | Mod: 25

## 2025-02-14 RX ORDER — AZITHROMYCIN 250 MG/1
250 TABLET, FILM COATED ORAL
Qty: 1 | Refills: 0 | Status: ACTIVE | COMMUNITY
Start: 2025-02-14 | End: 1900-01-01

## 2025-02-14 RX ORDER — LINACLOTIDE 145 UG/1
145 CAPSULE, GELATIN COATED ORAL DAILY
Qty: 90 | Refills: 3 | Status: ACTIVE | COMMUNITY
Start: 2025-02-14

## 2025-02-14 RX ORDER — HYDROCORTISONE ACETATE 25 MG/1
25 SUPPOSITORY RECTAL TWICE DAILY
Refills: 0 | Status: ACTIVE | COMMUNITY
Start: 2025-02-14

## 2025-02-14 RX ORDER — ATORVASTATIN CALCIUM 80 MG/1
80 TABLET, FILM COATED ORAL
Qty: 90 | Refills: 3 | Status: ACTIVE | COMMUNITY
Start: 2025-02-14

## 2025-02-14 RX ORDER — METOPROLOL SUCCINATE 25 MG/1
25 TABLET, EXTENDED RELEASE ORAL DAILY
Qty: 90 | Refills: 3 | Status: ACTIVE | COMMUNITY
Start: 2025-02-14

## 2025-02-14 RX ORDER — CHOLECALCIFEROL (VITAMIN D3) 1250 MCG
1.25 MG CAPSULE ORAL
Qty: 6 | Refills: 3 | Status: ACTIVE | COMMUNITY
Start: 2025-02-14

## 2025-02-14 RX ORDER — SULFASALAZINE 500 MG/1
500 TABLET ORAL EVERY OTHER DAY
Refills: 0 | Status: DISCONTINUED | COMMUNITY
Start: 2025-02-14 | End: 2025-02-14

## 2025-02-14 RX ORDER — FUROSEMIDE 40 MG/1
40 TABLET ORAL DAILY
Qty: 1 | Refills: 3 | Status: ACTIVE | COMMUNITY
Start: 2025-02-14

## 2025-02-14 RX ORDER — CHLORDIAZEPOXIDE HYDROCHLORIDE AND CLIDINIUM BROMIDE 5; 2.5 MG/1; MG/1
5-2.5 CAPSULE, GELATIN COATED ORAL TWICE DAILY
Qty: 180 | Refills: 1 | Status: ACTIVE | COMMUNITY
Start: 2025-02-14

## 2025-02-14 RX ORDER — NORMAL SALT TABLETS 1 G/G
1 TABLET ORAL
Qty: 30 | Refills: 1 | Status: ACTIVE | COMMUNITY
Start: 2025-02-14

## 2025-02-14 RX ORDER — MOMETASONE FUROATE AND FORMOTEROL FUMARATE DIHYDRATE 100; 5 UG/1; UG/1
100-5 AEROSOL RESPIRATORY (INHALATION) TWICE DAILY
Refills: 0 | Status: ACTIVE | COMMUNITY
Start: 2025-02-14

## 2025-02-14 RX ORDER — ALBUTEROL SULFATE 2.5 MG/3ML
(2.5 MG/3ML) SOLUTION RESPIRATORY (INHALATION)
Refills: 3 | Status: ACTIVE | COMMUNITY
Start: 2025-02-14

## 2025-02-14 RX ORDER — APIXABAN 2.5 MG/1
2.5 TABLET, FILM COATED ORAL
Qty: 180 | Refills: 3 | Status: ACTIVE | COMMUNITY
Start: 2025-02-14

## 2025-02-14 RX ORDER — CONTAINER,EMPTY
BOTTLE MISCELLANEOUS
Refills: 0 | Status: ACTIVE | COMMUNITY
Start: 2025-02-14

## 2025-02-14 RX ORDER — GLUCOSAMINE HCL 500 MG
400 TABLET ORAL DAILY
Refills: 0 | Status: ACTIVE | COMMUNITY
Start: 2025-02-14

## 2025-02-14 RX ORDER — POTASSIUM CHLORIDE 600 MG/1
8 TABLET, FILM COATED, EXTENDED RELEASE ORAL EVERY OTHER DAY
Qty: 45 | Refills: 3 | Status: ACTIVE | COMMUNITY
Start: 2025-02-14

## 2025-02-14 RX ORDER — MINERAL OIL 118 G/118ML
ENEMA RECTAL DAILY
Refills: 0 | Status: DISCONTINUED | COMMUNITY
Start: 2025-02-14 | End: 2025-02-14

## 2025-02-14 SDOH — SOCIAL STABILITY - SOCIAL INSECURITY: PROBLEMS RELATED TO LIVING ALONE: Z60.2

## 2025-02-18 RX ORDER — SAW/PYGEUM/BETA/HERB/D3/B6/ZN 30 MG-25MG
100 CAPSULE ORAL DAILY
Refills: 0 | Status: DISCONTINUED | COMMUNITY
Start: 2025-02-14 | End: 2025-02-18

## 2025-02-18 RX ORDER — CHLORHEXIDINE GLUCONATE 4 %
250 LIQUID (ML) TOPICAL DAILY
Qty: 90 | Refills: 3 | Status: ACTIVE | COMMUNITY
Start: 2025-02-18 | End: 1900-01-01

## 2025-02-18 RX ORDER — TAMSULOSIN HYDROCHLORIDE 0.4 MG/1
0.4 CAPSULE ORAL
Qty: 90 | Refills: 3 | Status: ACTIVE | COMMUNITY
Start: 2025-02-14 | End: 1900-01-01

## 2025-02-19 ENCOUNTER — NON-APPOINTMENT (OUTPATIENT)
Age: 89
End: 2025-02-19

## 2025-03-03 ENCOUNTER — NON-APPOINTMENT (OUTPATIENT)
Age: 89
End: 2025-03-03

## 2025-03-04 ENCOUNTER — NON-APPOINTMENT (OUTPATIENT)
Age: 89
End: 2025-03-04

## 2025-03-04 DIAGNOSIS — N50.89 OTHER SPECIFIED DISORDERS OF THE MALE GENITAL ORGANS: ICD-10-CM

## 2025-03-04 DIAGNOSIS — R39.9 UNSPECIFIED SYMPTOMS AND SIGNS INVOLVING THE GENITOURINARY SYSTEM: ICD-10-CM

## 2025-03-05 ENCOUNTER — LABORATORY RESULT (OUTPATIENT)
Age: 89
End: 2025-03-05

## 2025-03-05 ENCOUNTER — NON-APPOINTMENT (OUTPATIENT)
Age: 89
End: 2025-03-05

## 2025-03-06 ENCOUNTER — NON-APPOINTMENT (OUTPATIENT)
Age: 89
End: 2025-03-06

## 2025-03-10 ENCOUNTER — NON-APPOINTMENT (OUTPATIENT)
Age: 89
End: 2025-03-10

## 2025-03-10 RX ORDER — NITROFURANTOIN (MONOHYDRATE/MACROCRYSTALS) 25; 75 MG/1; MG/1
100 CAPSULE ORAL
Qty: 14 | Refills: 0 | Status: COMPLETED | COMMUNITY
Start: 2025-03-04 | End: 2025-03-10

## 2025-03-14 ENCOUNTER — EMERGENCY (EMERGENCY)
Facility: HOSPITAL | Age: 89
LOS: 0 days | Discharge: HOME | End: 2025-03-14
Attending: EMERGENCY MEDICINE
Payer: MEDICARE

## 2025-03-14 ENCOUNTER — TRANSCRIPTION ENCOUNTER (OUTPATIENT)
Age: 89
End: 2025-03-14

## 2025-03-14 VITALS
WEIGHT: 149.91 LBS | SYSTOLIC BLOOD PRESSURE: 135 MMHG | DIASTOLIC BLOOD PRESSURE: 78 MMHG | HEIGHT: 70 IN | RESPIRATION RATE: 19 BRPM | OXYGEN SATURATION: 96 % | TEMPERATURE: 98 F | HEART RATE: 79 BPM

## 2025-03-14 VITALS
DIASTOLIC BLOOD PRESSURE: 59 MMHG | SYSTOLIC BLOOD PRESSURE: 119 MMHG | RESPIRATION RATE: 19 BRPM | TEMPERATURE: 98 F | HEART RATE: 78 BPM | OXYGEN SATURATION: 89 %

## 2025-03-14 DIAGNOSIS — I48.91 UNSPECIFIED ATRIAL FIBRILLATION: ICD-10-CM

## 2025-03-14 DIAGNOSIS — T83.021A DISPLACEMENT OF INDWELLING URETHRAL CATHETER, INITIAL ENCOUNTER: ICD-10-CM

## 2025-03-14 DIAGNOSIS — K21.9 GASTRO-ESOPHAGEAL REFLUX DISEASE WITHOUT ESOPHAGITIS: ICD-10-CM

## 2025-03-14 DIAGNOSIS — Z86.73 PERSONAL HISTORY OF TRANSIENT ISCHEMIC ATTACK (TIA), AND CEREBRAL INFARCTION WITHOUT RESIDUAL DEFICITS: ICD-10-CM

## 2025-03-14 DIAGNOSIS — D37.9 NEOPLASM OF UNCERTAIN BEHAVIOR OF DIGESTIVE ORGAN, UNSPECIFIED: Chronic | ICD-10-CM

## 2025-03-14 DIAGNOSIS — J44.9 CHRONIC OBSTRUCTIVE PULMONARY DISEASE, UNSPECIFIED: ICD-10-CM

## 2025-03-14 DIAGNOSIS — Z79.01 LONG TERM (CURRENT) USE OF ANTICOAGULANTS: ICD-10-CM

## 2025-03-14 DIAGNOSIS — E78.5 HYPERLIPIDEMIA, UNSPECIFIED: ICD-10-CM

## 2025-03-14 DIAGNOSIS — E11.9 TYPE 2 DIABETES MELLITUS WITHOUT COMPLICATIONS: ICD-10-CM

## 2025-03-14 DIAGNOSIS — Z96.0 PRESENCE OF UROGENITAL IMPLANTS: ICD-10-CM

## 2025-03-14 DIAGNOSIS — Z95.0 PRESENCE OF CARDIAC PACEMAKER: ICD-10-CM

## 2025-03-14 DIAGNOSIS — J90 PLEURAL EFFUSION, NOT ELSEWHERE CLASSIFIED: ICD-10-CM

## 2025-03-14 DIAGNOSIS — I50.9 HEART FAILURE, UNSPECIFIED: ICD-10-CM

## 2025-03-14 DIAGNOSIS — N39.0 URINARY TRACT INFECTION, SITE NOT SPECIFIED: ICD-10-CM

## 2025-03-14 DIAGNOSIS — Z90.79 ACQUIRED ABSENCE OF OTHER GENITAL ORGAN(S): Chronic | ICD-10-CM

## 2025-03-14 DIAGNOSIS — I11.0 HYPERTENSIVE HEART DISEASE WITH HEART FAILURE: ICD-10-CM

## 2025-03-14 LAB
ALBUMIN SERPL ELPH-MCNC: 2.2 G/DL — LOW (ref 3.3–5)
ALP SERPL-CCNC: 74 U/L — SIGNIFICANT CHANGE UP (ref 40–120)
ALT FLD-CCNC: 8 U/L — LOW (ref 12–78)
ANION GAP SERPL CALC-SCNC: 3 MMOL/L — LOW (ref 5–17)
APPEARANCE UR: ABNORMAL
APTT BLD: 35.6 SEC — SIGNIFICANT CHANGE UP (ref 24.5–35.6)
AST SERPL-CCNC: 7 U/L — LOW (ref 15–37)
BACTERIA # UR AUTO: ABNORMAL /HPF
BASOPHILS # BLD AUTO: 0.03 K/UL — SIGNIFICANT CHANGE UP (ref 0–0.2)
BASOPHILS NFR BLD AUTO: 0.5 % — SIGNIFICANT CHANGE UP (ref 0–2)
BILIRUB SERPL-MCNC: 0.3 MG/DL — SIGNIFICANT CHANGE UP (ref 0.2–1.2)
BILIRUB UR-MCNC: NEGATIVE — SIGNIFICANT CHANGE UP
BLD GP AB SCN SERPL QL: SIGNIFICANT CHANGE UP
BUN SERPL-MCNC: 22 MG/DL — SIGNIFICANT CHANGE UP (ref 7–23)
CALCIUM SERPL-MCNC: 9.4 MG/DL — SIGNIFICANT CHANGE UP (ref 8.5–10.1)
CHLORIDE SERPL-SCNC: 94 MMOL/L — LOW (ref 96–108)
CO2 SERPL-SCNC: 34 MMOL/L — HIGH (ref 22–31)
COLOR SPEC: YELLOW — SIGNIFICANT CHANGE UP
CREAT SERPL-MCNC: 0.55 MG/DL — SIGNIFICANT CHANGE UP (ref 0.5–1.3)
DIFF PNL FLD: ABNORMAL
EGFR: 94 ML/MIN/1.73M2 — SIGNIFICANT CHANGE UP
EOSINOPHIL # BLD AUTO: 0.12 K/UL — SIGNIFICANT CHANGE UP (ref 0–0.5)
EOSINOPHIL NFR BLD AUTO: 1.9 % — SIGNIFICANT CHANGE UP (ref 0–6)
EPI CELLS # UR: PRESENT
GLUCOSE SERPL-MCNC: 119 MG/DL — HIGH (ref 70–99)
GLUCOSE UR QL: NEGATIVE MG/DL — SIGNIFICANT CHANGE UP
GRAN CASTS # UR COMP ASSIST: SIGNIFICANT CHANGE UP
HCT VFR BLD CALC: 39 % — SIGNIFICANT CHANGE UP (ref 39–50)
HGB BLD-MCNC: 12.6 G/DL — LOW (ref 13–17)
HYALINE CASTS # UR AUTO: SIGNIFICANT CHANGE UP
IMM GRANULOCYTES # BLD AUTO: 0.03 K/UL — SIGNIFICANT CHANGE UP (ref 0–0.07)
IMM GRANULOCYTES NFR BLD AUTO: 0.5 % — SIGNIFICANT CHANGE UP (ref 0–0.9)
INR BLD: 1.53 RATIO — HIGH (ref 0.85–1.16)
KETONES UR-MCNC: NEGATIVE MG/DL — SIGNIFICANT CHANGE UP
LACTATE SERPL-SCNC: 0.9 MMOL/L — SIGNIFICANT CHANGE UP (ref 0.7–2)
LEUKOCYTE ESTERASE UR-ACNC: ABNORMAL
LYMPHOCYTES # BLD AUTO: 0.82 K/UL — LOW (ref 1–3.3)
LYMPHOCYTES NFR BLD AUTO: 13 % — SIGNIFICANT CHANGE UP (ref 13–44)
MAGNESIUM SERPL-MCNC: 1.7 MG/DL — SIGNIFICANT CHANGE UP (ref 1.6–2.6)
MCHC RBC-ENTMCNC: 28.9 PG — SIGNIFICANT CHANGE UP (ref 27–34)
MCHC RBC-ENTMCNC: 32.3 G/DL — SIGNIFICANT CHANGE UP (ref 32–36)
MCV RBC AUTO: 89.4 FL — SIGNIFICANT CHANGE UP (ref 80–100)
MONOCYTES # BLD AUTO: 0.5 K/UL — SIGNIFICANT CHANGE UP (ref 0–0.9)
MONOCYTES NFR BLD AUTO: 7.9 % — SIGNIFICANT CHANGE UP (ref 2–14)
NEUTROPHILS # BLD AUTO: 4.79 K/UL — SIGNIFICANT CHANGE UP (ref 1.8–7.4)
NEUTROPHILS NFR BLD AUTO: 76.2 % — SIGNIFICANT CHANGE UP (ref 43–77)
NITRITE UR-MCNC: POSITIVE
NRBC # BLD AUTO: 0 K/UL — SIGNIFICANT CHANGE UP (ref 0–0)
NRBC # FLD: 0 K/UL — SIGNIFICANT CHANGE UP (ref 0–0)
NRBC BLD AUTO-RTO: 0 /100 WBCS — SIGNIFICANT CHANGE UP (ref 0–0)
PH UR: 5 — SIGNIFICANT CHANGE UP (ref 5–8)
PHOSPHATE SERPL-MCNC: 3.1 MG/DL — SIGNIFICANT CHANGE UP (ref 2.5–4.5)
PLATELET # BLD AUTO: 207 K/UL — SIGNIFICANT CHANGE UP (ref 150–400)
PMV BLD: 9.3 FL — SIGNIFICANT CHANGE UP (ref 7–13)
POTASSIUM SERPL-MCNC: 4.5 MMOL/L — SIGNIFICANT CHANGE UP (ref 3.5–5.3)
POTASSIUM SERPL-SCNC: 4.5 MMOL/L — SIGNIFICANT CHANGE UP (ref 3.5–5.3)
PROT SERPL-MCNC: 5.9 GM/DL — LOW (ref 6–8.3)
PROT UR-MCNC: 300 MG/DL
PROTHROM AB SERPL-ACNC: 18 SEC — HIGH (ref 9.9–13.4)
RBC # BLD: 4.36 M/UL — SIGNIFICANT CHANGE UP (ref 4.2–5.8)
RBC # FLD: 14.8 % — HIGH (ref 10.3–14.5)
RBC CASTS # UR COMP ASSIST: 45 /HPF — SIGNIFICANT CHANGE UP (ref 0–4)
SODIUM SERPL-SCNC: 131 MMOL/L — LOW (ref 135–145)
SP GR SPEC: 1.02 — SIGNIFICANT CHANGE UP (ref 1–1.03)
TROPONIN I, HIGH SENSITIVITY RESULT: 15.47 NG/L — SIGNIFICANT CHANGE UP
UROBILINOGEN FLD QL: 0.2 MG/DL — SIGNIFICANT CHANGE UP (ref 0.2–1)
WBC # BLD: 6.29 K/UL — SIGNIFICANT CHANGE UP (ref 3.8–10.5)
WBC # FLD AUTO: 6.29 K/UL — SIGNIFICANT CHANGE UP (ref 3.8–10.5)
WBC UR QL: 60 /HPF — HIGH (ref 0–5)

## 2025-03-14 PROCEDURE — 84100 ASSAY OF PHOSPHORUS: CPT

## 2025-03-14 PROCEDURE — 85730 THROMBOPLASTIN TIME PARTIAL: CPT

## 2025-03-14 PROCEDURE — 74176 CT ABD & PELVIS W/O CONTRAST: CPT | Mod: MC

## 2025-03-14 PROCEDURE — 85610 PROTHROMBIN TIME: CPT

## 2025-03-14 PROCEDURE — 36415 COLL VENOUS BLD VENIPUNCTURE: CPT

## 2025-03-14 PROCEDURE — 80053 COMPREHEN METABOLIC PANEL: CPT

## 2025-03-14 PROCEDURE — 93005 ELECTROCARDIOGRAM TRACING: CPT | Mod: XU

## 2025-03-14 PROCEDURE — 84484 ASSAY OF TROPONIN QUANT: CPT

## 2025-03-14 PROCEDURE — 83735 ASSAY OF MAGNESIUM: CPT

## 2025-03-14 PROCEDURE — 86900 BLOOD TYPING SEROLOGIC ABO: CPT

## 2025-03-14 PROCEDURE — 86850 RBC ANTIBODY SCREEN: CPT

## 2025-03-14 PROCEDURE — 51702 INSERT TEMP BLADDER CATH: CPT

## 2025-03-14 PROCEDURE — 71045 X-RAY EXAM CHEST 1 VIEW: CPT

## 2025-03-14 PROCEDURE — 93010 ELECTROCARDIOGRAM REPORT: CPT

## 2025-03-14 PROCEDURE — 85025 COMPLETE CBC W/AUTO DIFF WBC: CPT

## 2025-03-14 PROCEDURE — 99285 EMERGENCY DEPT VISIT HI MDM: CPT | Mod: 25

## 2025-03-14 PROCEDURE — 81001 URINALYSIS AUTO W/SCOPE: CPT

## 2025-03-14 PROCEDURE — 71045 X-RAY EXAM CHEST 1 VIEW: CPT | Mod: 26

## 2025-03-14 PROCEDURE — 87040 BLOOD CULTURE FOR BACTERIA: CPT

## 2025-03-14 PROCEDURE — 87186 SC STD MICRODIL/AGAR DIL: CPT

## 2025-03-14 PROCEDURE — 96374 THER/PROPH/DIAG INJ IV PUSH: CPT | Mod: XU

## 2025-03-14 PROCEDURE — 87086 URINE CULTURE/COLONY COUNT: CPT

## 2025-03-14 PROCEDURE — 99285 EMERGENCY DEPT VISIT HI MDM: CPT

## 2025-03-14 PROCEDURE — 86901 BLOOD TYPING SEROLOGIC RH(D): CPT

## 2025-03-14 PROCEDURE — 83605 ASSAY OF LACTIC ACID: CPT

## 2025-03-14 PROCEDURE — 74176 CT ABD & PELVIS W/O CONTRAST: CPT | Mod: 26

## 2025-03-14 RX ORDER — FUROSEMIDE 10 MG/ML
20 INJECTION INTRAMUSCULAR; INTRAVENOUS ONCE
Refills: 0 | Status: COMPLETED | OUTPATIENT
Start: 2025-03-14 | End: 2025-03-14

## 2025-03-14 RX ORDER — CEFTRIAXONE 500 MG/1
2000 INJECTION, POWDER, FOR SOLUTION INTRAMUSCULAR; INTRAVENOUS ONCE
Refills: 0 | Status: COMPLETED | OUTPATIENT
Start: 2025-03-14 | End: 2025-03-14

## 2025-03-14 RX ORDER — CEFTRIAXONE 500 MG/1
2000 INJECTION, POWDER, FOR SOLUTION INTRAMUSCULAR; INTRAVENOUS ONCE
Refills: 0 | Status: DISCONTINUED | OUTPATIENT
Start: 2025-03-14 | End: 2025-03-14

## 2025-03-14 RX ADMIN — CEFTRIAXONE 2000 MILLIGRAM(S): 500 INJECTION, POWDER, FOR SOLUTION INTRAMUSCULAR; INTRAVENOUS at 13:34

## 2025-03-14 RX ADMIN — FUROSEMIDE 20 MILLIGRAM(S): 10 INJECTION INTRAMUSCULAR; INTRAVENOUS at 12:24

## 2025-03-14 NOTE — ED PROVIDER NOTE - PHYSICAL EXAMINATION
Constitutional: NAD AAOx3. awake.   Eyes: EOMI, pupils equal  Head: Normocephalic atraumatic  Mouth: no airway obstruction  Cardiac: regular rate   Resp: Lungs CTAB  GI: Abd s/nt/nd  Neuro: CN2-12 intact  Skin: No rashes

## 2025-03-14 NOTE — ED PROVIDER NOTE - CLINICAL SUMMARY MEDICAL DECISION MAKING FREE TEXT BOX
89 year old male presents with  possible robertson catheter malfunction. plan: change Robertson catheter. patient currently satting 96% on RA.

## 2025-03-14 NOTE — ED ADULT NURSE NOTE - OBJECTIVE STATEMENT
bib wife  c/o urinary retention x3 days. pt had robertson changed on Wednesday with minimal output.  noted his oxygen was 82% on RA, wife and aide at bedside. pt is swelled all over. as per wife pt takes lasix [id number] along with slt pills. md ospina at bedside.

## 2025-03-14 NOTE — ED PROVIDER NOTE - PATIENT PORTAL LINK FT
You can access the FollowMyHealth Patient Portal offered by [facility] by registering at the following website: [email or web address]. By joining [employer]’s FollowMyHealth portal, you will also be able to view your health information using other applications (apps) compatible with our system.

## 2025-03-14 NOTE — ED PROVIDER NOTE - PROGRESS NOTE DETAILS
ED Attending Dr. Segundo, d/w Daughter, pt and aide.  Pt does not want hospice.  Family states main goal would be to keep pt comfortable. ED Attending Dr. Segundo, More urine into robertson bag.  Possible robertson in bad position before.  d/w daughter results.  Pt with know pleural effusions and swelling.  d/w daughter and would like pt to go home, has help for home and will continue cirpro.

## 2025-03-14 NOTE — ED PROVIDER NOTE - OBJECTIVE STATEMENT
89 year old male with PMHx of COPD, CHF on Lasix, A-fib on Eliquis, GERD, hypertension, hyperlipidemia, CVA, diabetes, rectal CA with chronic diarrhea from enemas, atonic bladder with self-catheterizations, status post permanent pacemaker presents to ED with daughter at bedside BIBEMS c/o minimal robertsno catheter output for 3 days. Daughter at bedside states the patient is a part of [facility] at home care and had his Robertson catheter changed on Wednesday. At that time the home nurse noticed upon insertion there was not an immediate release of urine, which is atypical for him. since then, the patient has only passed about 125 cc of urine per day. at baseline the patient passes about 400 cc of urine per day. Patient complains of bladder pressure and discomfort since the onset of urinary hesitancy.  patient is currently on Ciprofloxacin 500 mg for the treatment of a UTI.  patient was placed on 2LNC as he was noticed to be satting in the 80s by EMS, now resolved satting at 96% on RA. denies chest pain, difficulty breathing

## 2025-03-14 NOTE — ED PROVIDER NOTE - NSFOLLOWUPINSTRUCTIONS_ED_ALL_ED_FT
Please call and follow up with your doctor in 3-5 days.    Continue the ciprofloxacin at home.    Return to the Emergency Department for worsening or persistent symptoms, and/or ANY NEW OR CONCERNING SYMPTOMS. If you have issues obtaining follow up, please call: [phone] or [phone]  to obtain a doctor or specialist who takes your insurance in your area.      What is a Crump catheter?  A Crump catheter is a thin, flexible, sterile tube that drains urine from your bladder. It is also called an indwelling urinary catheter. The tip of the catheter has a small balloon filled with solution that holds the catheter in your bladder.        How is a Crump catheter placed?  Your healthcare provider will clean your genital area to prevent infection. Your provider will insert the catheter into your urethra. When urine begins to flow into the tubing, the balloon is filled to keep the catheter in place. The open end of the catheter is attached to a drainage bag. Urine drains from your bladder, through the tube into the drainage bag.    Why is catheter care important?  An infection can develop when bacteria get inside the catheter or drainage system. This can happen when the urine bag is changed or when a urine sample is collected. You can get an infection if you do not wash your hands. You can also get an infection if the catheter equipment is not cleaned properly. Urinary catheter-based infections can lead to serious illness. The following can help prevent infection:    Care for your catheter as directed. Follow directions on how to clean and care for your catheter, insertion site, and drainage bag.  Wash your hands often. Always wash with soap and water before and after you touch your catheter, tubing, or drainage bag. Wear clean medical gloves when you care for your catheter or disconnect the drainage bag. This will help stop germs from getting into your catheter. Remind anyone who cares for your catheter or drainage system to wash his or her hands.  Handwashing  Clean your genital area. Wash the catheter area 2 times every day. Wash your anal opening after every bowel movement. Use a soapy cloth to clean the area:  If you are male, clean the tip of your penis. Start where the catheter enters. Wipe backward, making sure to pull back the foreskin. Then use a cloth with clear water in the same direction to clean away the soap.  If you are female, clean the area where the catheter enters your body. Separate your labia (the smaller folds of your skin around your vaginal opening) and wipe toward the anus. Then use a cloth with clear water and wipe in the same direction.  Secure the catheter tube. Healthcare providers will show you how to use medical tape or a strap to secure the catheter tube to your leg. Do not pull or move the catheter. This helps prevent pain and bladder spasms.  Drink liquids as directed. This will help keep your urine clear and prevent catheter blockage and infection. Good choices for most people include water, juice, and milk. Ask how much liquid to drink each day and which liquids are best for you.  Drugs used to treat this and similar conditions  Myrbetriq  Myrbetriq (mirabegron) is used to treat overactive bladder with symptoms of frequent or urgent ...    Reviews & ratings  4.9 / 10  276 Reviews  VESIcare  Vesicare is used to treat symptoms of overactive bladder such as incontinence and frequent ...    Reviews & ratings  5.5 / 10  141 Reviews  Botox  Botox is used to treat migraines, excessive sweating, bladder conditions, neck and muscle spasms ...    Reviews & ratings  5.7 / 10  472 Reviews  Urecholine  Urecholine is used for abdominal distension, urinary retention    Reviews & ratings  2 Reviews  VESIcare LS  VESIcare LS is used for neurogenic bladder, neurogenic detrusor overactivity    Reviews & ratings  Add a review  Tofranil  Tofranil is used for depression, enuresis, pain, primary nocturnal enuresis    Reviews & ratings  7.6 / 10  16 Reviews  Detrol LA  Detrol LA is used for urinary frequency, urinary incontinence    Reviews & ratings  6.2 / 10  18 Reviews  Toviaz  Toviaz (fesoterodine) is used to treat overactive bladder with symptoms of urinary frequency ...    Reviews & ratings  5.2 / 10  74 Reviews  Amitriptyline  Amitriptyline is a tricyclic antidepressant used for depression, insomnia, migraine prevention, and ...    Reviews & ratings  7.6 / 10  1,801 Reviews  Solifenacin  Solifenacin systemic is used for neurogenic bladder, neurogenic detrusor overactivity, overactive ...    Reviews & ratings  5.5 / 10  201 Reviews    How do I care for my drainage bag?  You will use a leg bag during the day. You may need a larger drainage bag at night.    Keep a closed drainage system. Your catheter should always be attached to the drainage bag to form a closed system. Do not disconnect any part of the closed system unless you need to change the bag.  Position the drainage bag properly. Keep the drainage bag below the level of your waist. This helps stop urine from moving back up the tubing and into your bladder. Do not loop or kink the tubing. This can cause urine to back up and collect in your bladder. Do not let the drainage bag touch or lie on the floor. Do not lie down or sleep with your drainage bag attached to your leg.  Empty the drainage bag when needed. The weight of a full drainage bag can pull on the catheter and cause pain. Empty the drainage bag every 3 to 6 hours or when it is ? full.  Clean and change the drainage bag as directed. Ask your healthcare provider how often you should change the drainage bag and which cleaning solution to use. Wear disposable gloves when you change the bag. Do not allow the end of the catheter or tubing to touch anything. Clean the ends with an alcohol pad before you reconnect them.  What can I do if problems develop?  No urine is draining into the bag:  Check for kinks in the tubing and straighten them out.  Check the tape or strap used to secure the catheter tube to your skin. Make sure it is not blocking the tube.  Make sure you are not sitting or lying on the tubing.  Make sure the urine bag is hanging below the level of your waist.  Urine leaks from or around the catheter, tubing, or drainage bag: Check if the closed drainage system has accidently come open or apart. Clean the catheter and tubing ends with a new alcohol pad and reconnect them.  When should I seek immediate care?  Your catheter comes out.  You suddenly have material that looks like sand in the tubing or drainage bag.  You see blood in the tubing or drainage bag.  Little or no urine is draining into the bag, and you have checked the system.  You have pain in your hip, back, pelvis, or lower abdomen.  You are confused or cannot think clearly.  You see swelling, redness, pus, or burning where the catheter goes into your body.  When should I call my doctor?  You have a fever or shaking chills.  You have bladder spasms for more than 1 day after the catheter is placed.  Your urine has a strong smell.  You have a rash or itching where the catheter tube is secured to your skin.  Urine leaks from or around the catheter, tubing, or drainage bag.  The closed drainage system has accidentally come open or apart.  You see a layer of crystals inside the tubing.  You have questions or concerns about your condition or care.

## 2025-03-14 NOTE — ED ADULT TRIAGE NOTE - CHIEF COMPLAINT QUOTE
Pt coming in by ems from home with c/o urinary retention x3 days. EMS reports the patient has a chronic robertson. EMS reports a urine output of 100cc in 3 days. EMS noted patient has hx of COPD and is NOT on oxygen at home but noted his oxygen was 82% on RA, patient normally sats in the high 80 or low 90s. NKDA. NA assisted patient to room to place on bed alarm. Pt on 2LNC at this time. Pt endorses pressure in the lower abdomen. Pt is A&OX3 in triage.

## 2025-03-14 NOTE — ED ADULT TRIAGE NOTE - PATIENT ON (OXYGEN DELIVERY METHOD)
ADMISSION NOTE   [address]       Name: Vishal Crocker   Age & Sex: 72 y.o. male   MRN: [id number]  Unit/Bed#: FT 05   Encounter: [id number]  Primary Care Provider: Stan Vasquez MD      * [address] [place] [address] with 1 episode of fall while getting out of car. Denied any symptoms prior to fall. Denies lightheadedness, palpitations, dizziness, vertigo, visual disturbances. No bowel or bladder incontinence. No history of stroke or seizure. Has history of left BKA states he was taking out his legs somehow he slipped and fell down. Trauma work-up unremarkable otherwise. Found to have lt multiple nondisplaced closed rib fractures. Patient in severe pain worse with movement. Admitted overnight for pain management. Tylenol every 6 hours scheduled  Oxycodone every 4 hours as needed  Dilaudid every 3 hours as needed  Voltaren gel, lidocaine patch, Bengay cream topically    Closed fracture of multiple ribs of left side  Assessment & Plan  Noted on imaging. Admitted for pain management. Pain management as above  Incentive spirometry    Hx of BKA, left (HCC)  Assessment & Plan  Noted    Type 2 diabetes mellitus with foot ulcer ([address])  Assessment & Plan  Lab Results   Component Value Date    HGBA1C 6.7 (H) 07/17/2023   Allergic to Lantus. On Tresiba 38 units at bedtime along with Humalog 8 units 3 times daily with meals. We will continue Levemir at 38 units at bedtime along with lispro 8 units 3 times daily with meals  Hypoglycemic protocol  Sliding scale coverage          VTE Prophylaxis: Enoxaparin (Lovenox)  / sequential compression device and foot pump applied   Code Status: Level 3 DNR/DNI  POLST: Unknown  Discussion with family: None    Anticipated Length of Stay:  Patient will be admitted on an Observation basis with an anticipated length of stay of less than 2 midnights.    Justification for Hospital Stay: Pain management for left rib fracture    Total Time for Visit, including Counseling / Coordination of Care: 45 minutes. Greater than 50% of this total time spent on direct patient counseling and coordination of care. Chief Complaint:   Fall on the day of presentation    History of Present Illness:    Mickey Figueroa is a 72 y.o. male with past medical history of diabetes, BPH, hypertension who presents with 1 episode of fall on the day of presentation while he was getting out of car where he placed his legs incorrectly and fell down. No bleeding noted. Denies fever, chills, night sweats, weight loss, abdominal pain, diarrhea, nausea vomiting, chest pain, palpitations prior to fall. Denies any bowel bladder incontinence. No history of stroke. No neurological weakness or numbness which is new. No vision disturbances. .    Review of Systems:    Review of Systems   Constitutional:  Positive for activity change. Negative for appetite change, chills, diaphoresis, fatigue, fever and unexpected weight change. HENT:  Negative for rhinorrhea and sore throat. Eyes:  Negative for visual disturbance. Respiratory:  Negative for cough, chest tightness and shortness of breath. Cardiovascular:  Negative for chest pain, palpitations and leg swelling. Gastrointestinal:  Negative for abdominal distention, abdominal pain, blood in stool, constipation, diarrhea, nausea and vomiting. Genitourinary:  Negative for difficulty urinating, dysuria, frequency, hematuria and urgency. Musculoskeletal:  Positive for myalgias. Negative for arthralgias. Neurological:  Negative for dizziness, tremors, seizures, syncope, speech difficulty, weakness, light-headedness and headaches. Psychiatric/Behavioral:  Negative for behavioral problems and sleep disturbance.         Past Medical and Surgical History:     Past Medical History:   Diagnosis Date    BPH (benign prostatic hypertrophy) 10 /2020    Chronic kidney disease 8/2020    Diabetes mellitus ([facility])     Foot ulcer due to secondary DM ([facility])     Hypertension 4 /2021    Urinary incontinence 8/2022    Urinary tract infection 4/2023       Past Surgical History:   Procedure Laterality Date    ESOPHAGOGASTRODUODENOSCOPY N/A 02/10/2017    Procedure: ESOPHAGOGASTRODUODENOSCOPY (EGD); Surgeon: Chuck La MD;  Location: [facility]; Service:     JOINT REPLACEMENT      LEG AMPUTATION THROUGH LOWER TIBIA AND FIBULA Left 04/06/2023    Procedure: AMPUTATION BELOW KNEE (BKA); Surgeon: Juliette Thomas DO;  Location: [facility];  Service: Vascular    ORCHIECTOMY  5/2022    they thought It was cancer but turned out not to be       Meds/Allergies:    Prior to Admission medications    Medication Sig Start Date End Date Taking?  Authorizing Provider   albuterol (PROVENTIL HFA,VENTOLIN HFA) 90 mcg/act inhaler Inhale 2 puffs every 4 (four) hours as needed for wheezing or shortness of breath (coughing spells) 2/5/17   Jeanette Gonzales MD   alfuzosin (UROXATRAL) 10 mg 24 hr tablet Take 1 tablet (10 mg total) by mouth daily 6/8/23   Genet Serna PA-C   amLODIPine (NORVASC) 5 mg tablet Take 5 mg by mouth daily    Historical Provider, MD   atorvastatin (LIPITOR) 20 mg tablet Take 20 mg by mouth daily    Historical Provider, MD   benzonatate (TESSALON PERLES) 100 mg capsule Take 100 mg by mouth 3 (three) times a day as needed for cough    Historical Provider, MD   cephalexin (KEFLEX) 500 mg capsule Take 500 mg by mouth 2 (two) times a day 10/5/23   Historical Provider, MD   clopidogrel (PLAVIX) 75 mg tablet Take 75 mg by mouth daily    Historical Provider, MD   finasteride (PROSCAR) 5 mg tablet Take 1 tablet (5 mg total) by mouth daily 4/11/23   Zee Wheeler MD   gabapentin (NEURONTIN) 300 mg capsule Take 300 mg by mouth 3 (three) times a day    Historical Provider, MD   Glucagon (Gvoke HypoPen 2-Pack) 1 MG/0.2ML SOAJ Inject 1 mg under the skin 7/5/23   Historical Provider, MD   HumaLOG KwikPen 100 units/mL injection pen  9/27/23 Historical Provider, MD   insulin degludec Hugo Elizabeth FlexTouch) 200 units/mL CONCENTRATED U-200 injection pen Inject 25 Units under the skin daily at bedtime  Patient taking differently: Inject 36 Units under the skin daily at bedtime 4/11/23   Justin Hodgson MD   insulin NPH (HumuLIN N,NovoLIN N) 100 Units/mL subcutaneous injection Inject 35 Units under the skin 2 (two) times a day before meals  Patient taking differently: Inject 8 Units under the skin 2 (two) times a day before meals 4/11/23   Justin Hodgson MD   lisinopril (ZESTRIL) 2.5 mg tablet every 24 hours    Historical Provider, MD   meloxicam (MOBIC) 7.5 mg tablet Take 7.5 mg by mouth daily 6/27/23   Historical Provider, MD   metFORMIN (GLUCOPHAGE) 500 mg tablet Take 1,000 mg by mouth 2 (two) times a day with meals    Historical Provider, MD   oxyCODONE (ROXICODONE) 5 immediate release tablet TAKE 1 TABLET BY MOUTH EVERY 4 HOURS AS NEEDED FOR MODERATE PAIN (PAIN SCORE 4-6) MAX DAILY AMOUNT 30MG  Patient not taking: Reported on 9/8/2023 4/20/23   Historical Provider, MD   pantoprazole (PROTONIX) 40 mg tablet Take 1 tablet by mouth daily for 30 days 2/15/17 9/8/23  Rosetta Leos MD   tolterodine (DETROL LA) 2 mg 24 hr capsule Take 1 capsule (2 mg total) by mouth daily  Patient not taking: Reported on 10/11/2023 9/8/23   Estefani Turner PA-C   pravastatin (PRAVACHOL) 20 mg tablet 1 tab(s) orally once a day (at bedtime) for 90 days  10/13/23  Historical Provider, MD BOWEN have reviewed home medications with patient personally. Allergies:    Allergies   Allergen Reactions    Liraglutide GI Intolerance     Severe acid reflux    Iodinated Contrast Media Other (See Comments)     "kidney problems"    Lantus [Insulin Glargine] Hives    Sulfa Antibiotics Rash       Social History:     Marital Status: /Civil Union   Substance Use History:   Social History     Substance and Sexual Activity   Alcohol Use Not Currently     Social History     Tobacco Use   Smoking Status Former    Packs/day: 1.50    Years: 40.00    Total pack years: 60.00    Types: Cigarettes, Pipe    Start date: 1969    Quit date: 2009    Years since quittin.7    Passive exposure: Past   Smokeless Tobacco Never   Tobacco Comments    already have     Social History     Substance and Sexual Activity   Drug Use Never       Family History:    Family History   Problem Relation Age of Onset    Heart attack Father     Heart disease Father          of massive heart attack    Cancer Mother          stomach cancer    Diabetes Mother     No Known Problems Brother     No Known Problems Daughter         Physical Exam:     Vitals:   Blood Pressure: 149/71 (10/13/23 1900)  Pulse: 76 (10/13/23 1900)  Temperature: 98 °F (36.7 °C) (10/13/23 1425)  Respirations: 18 (10/13/23 1900)  SpO2: 98 % (10/13/23 1900)    Physical Exam  Vitals reviewed. Constitutional:       General: He is not in acute distress. Appearance: Normal appearance. HENT:      Head: Normocephalic and atraumatic. Eyes:      General: No scleral icterus. Right eye: No discharge. Left eye: No discharge. Cardiovascular:      Rate and Rhythm: Normal rate and regular rhythm. Pulses: Normal pulses. Heart sounds: Normal heart sounds. Pulmonary:      Effort: Pulmonary effort is normal. No respiratory distress. Breath sounds: Normal breath sounds. No wheezing or rales. Chest:      Chest wall: Tenderness (Left-sided) present. Abdominal:      General: Abdomen is flat. Bowel sounds are normal. There is no distension. Palpations: Abdomen is soft. Tenderness: There is no abdominal tenderness. Musculoskeletal:         General: No deformity. Normal range of motion. Cervical back: Normal range of motion and neck supple. Right lower leg: No edema. Left lower leg: No edema. Skin:     General: Skin is warm. Coloration: Skin is not jaundiced or pale.       Findings: No rash.   Neurological:      General: No focal deficit present. Mental Status: He is alert and oriented to person, place, and time. Mental status is at baseline. Cranial Nerves: No cranial nerve deficit. Motor: No weakness. Psychiatric:         Mood and Affect: Mood normal.         Behavior: Behavior normal.         Additional Data:     Lab Results: I have personally reviewed pertinent reports. Results from last 7 days   Lab Units 10/13/23  1559   WBC Thousand/uL 10.98*   HEMOGLOBIN g/dL 9.9*   HEMATOCRIT % 33.5*   PLATELETS Thousands/uL 380     Results from last 7 days   Lab Units 10/13/23  1559   SODIUM mmol/L 137   POTASSIUM mmol/L 4.5   CHLORIDE mmol/L 107   CO2 mmol/L 24   BUN mg/dL 22   CREATININE mg/dL 1.19   ANION GAP mmol/L 6   CALCIUM mg/dL 8.8   ALBUMIN g/dL 4.1   TOTAL BILIRUBIN mg/dL 0.40   ALK PHOS U/L 69   ALT U/L 11   AST U/L 11*   GLUCOSE RANDOM mg/dL 279*                       Imaging: I have personally reviewed pertinent reports. CT chest abdomen pelvis w contrast   Final Result by Jenniffer Mckeon MD (10/13 4151)      Question left anterior and lateral 4th-7th nondisplaced rib fractures. Prostatomegaly. I personally communicated this study with Fox Salmeron on 10/13/2023 5:29 PM.                     Workstation performed: [id number]         CT head without contrast   Final Result by Jenniffer Mckeon MD (10/13 8069)      No acute intracranial abnormality. Workstation performed: [id number]         CT spine cervical without contrast   Final Result by Jenniffer Mckeon MD (10/13 9704)      No cervical spine fracture or traumatic malalignment. Workstation performed: [id number]         XR chest 2 views   Final Result by Samantha Akhtar MD (67/12 2294)      No acute cardiopulmonary disease.                   Workstation performed: [id number]             EKG, Pathology, and Other Studies Reviewed on Admission:   EKG: Not available    Allscripts / [facility] Records Reviewed: Yes     ** Please Note: This note has been constructed using a voice recognition system.  ** nasal cannula

## 2025-03-14 NOTE — ED PROVIDER NOTE - PRINCIPAL DIAGNOSIS
Patient verbalized pain relief after given Dilaudid. Encouraged patient to rest. Patient stated her bottom is sore. Coccyx slightly pink on crack, although no tear. Left buttocks has a red abrasion, but no tear, appears to be scratched where patient said it hurts. Patient denies scratching self. Encouraged patient to keep turning from side to side. Pillows placed under bottom to tilt to side for comfort. Crump catheter problem

## 2025-03-14 NOTE — ED ADULT TRIAGE NOTE - ARRIVAL FROM
Progress Note    Admit Date:  6/5/2021    Subjective:  Ms. Betina Craig denies any concerns at this time. RN denies any medical concerns at this time. Objective:   BP (!) 160/90   Pulse 104   Temp 98.7 °F (37.1 °C) (Temporal)   Resp 16   Ht 5' 5\" (1.651 m)   Wt 132 lb 14.4 oz (60.3 kg)   SpO2 94%   BMI 22.12 kg/m²         Intake/Output Summary (Last 24 hours) at 6/7/2021 1420  Last data filed at 6/6/2021 1735  Gross per 24 hour   Intake 240 ml   Output --   Net 240 ml       Physical Exam:  Gen: No distress. Alert. Elderly  female  Eyes: No sclera icterus. No conjunctival injection. ENT: Poor dentition   Neck:  Trachea midline. Resp: No accessory muscle use. No crackles. No wheezes. No rhonchi. CV: Regular rate. Regular rhythm. No murmur. No rub. No edema. GI: Non-tender. Non-distended. Normal bowel sounds. Skin: Warm and dry. No rash on exposed extremities. M/S: No cyanosis. No joint deformity. No clubbing. Neuro: Awake. No focal neurologic deficit on exam.  Cranial nerves II through XII intact. Patient is able to ambulate without difficulty.    Psych: Per psychiatry team evaluation     Scheduled Meds:   divalproex  250 mg Oral 2 times per day    acetaminophen  1,000 mg Oral TID    donepezil  5 mg Oral Daily with breakfast    ferrous sulfate  325 mg Oral BID WC    furosemide  20 mg Oral Daily    levothyroxine  25 mcg Oral Daily    meloxicam  7.5 mg Oral Daily    metFORMIN  500 mg Oral Daily with breakfast    nicotine  1 patch Transdermal Daily    oxybutynin  10 mg Oral Daily    traZODone  50 mg Oral Nightly     PRN Meds:  ondansetron, magnesium hydroxide, aluminum & magnesium hydroxide-simethicone, haloperidol **OR** haloperidol lactate, benztropine mesylate      Data:  CBC:   Recent Labs     06/05/21  1515   WBC 7.4   HGB 11.6*   HCT 35.4*   MCV 82.9        BMP:   Recent Labs     06/05/21  1515      K 4.5      CO2 27   BUN 22*   CREATININE 1.1     LIVER Home

## 2025-03-15 ENCOUNTER — APPOINTMENT (OUTPATIENT)
Dept: AFTER HOURS CARE | Facility: EMERGENCY ROOM | Age: 89
End: 2025-03-15
Payer: MEDICARE

## 2025-03-15 ENCOUNTER — NON-APPOINTMENT (OUTPATIENT)
Age: 89
End: 2025-03-15

## 2025-03-15 ENCOUNTER — TRANSCRIPTION ENCOUNTER (OUTPATIENT)
Age: 89
End: 2025-03-15

## 2025-03-15 DIAGNOSIS — E87.1 HYPO-OSMOLALITY AND HYPONATREMIA: ICD-10-CM

## 2025-03-15 DIAGNOSIS — R09.02 HYPOXEMIA: ICD-10-CM

## 2025-03-15 PROCEDURE — 99215 OFFICE O/P EST HI 40 MIN: CPT | Mod: 2W

## 2025-03-15 RX ORDER — CIPROFLOXACIN HYDROCHLORIDE 500 MG/1
500 TABLET, FILM COATED ORAL
Qty: 10 | Refills: 0 | Status: ACTIVE | COMMUNITY
Start: 2025-03-10

## 2025-03-15 RX ORDER — BISACODYL 10 MG/1
10 SUPPOSITORY RECTAL DAILY
Qty: 3 | Refills: 0 | Status: ACTIVE | COMMUNITY
Start: 2025-03-03

## 2025-03-17 ENCOUNTER — LABORATORY RESULT (OUTPATIENT)
Age: 89
End: 2025-03-17

## 2025-03-17 ENCOUNTER — NON-APPOINTMENT (OUTPATIENT)
Age: 89
End: 2025-03-17

## 2025-03-18 ENCOUNTER — TRANSCRIPTION ENCOUNTER (OUTPATIENT)
Age: 89
End: 2025-03-18

## 2025-03-18 ENCOUNTER — NON-APPOINTMENT (OUTPATIENT)
Age: 89
End: 2025-03-18

## 2025-03-18 ENCOUNTER — EMERGENCY (EMERGENCY)
Facility: HOSPITAL | Age: 89
LOS: 1 days | Discharge: HOME | End: 2025-03-18
Attending: STUDENT IN AN ORGANIZED HEALTH CARE EDUCATION/TRAINING PROGRAM | Admitting: STUDENT IN AN ORGANIZED HEALTH CARE EDUCATION/TRAINING PROGRAM
Payer: MEDICARE

## 2025-03-18 VITALS
SYSTOLIC BLOOD PRESSURE: 103 MMHG | HEART RATE: 78 BPM | OXYGEN SATURATION: 93 % | DIASTOLIC BLOOD PRESSURE: 57 MMHG | RESPIRATION RATE: 16 BRPM

## 2025-03-18 VITALS
RESPIRATION RATE: 16 BRPM | TEMPERATURE: 95 F | OXYGEN SATURATION: 98 % | HEART RATE: 100 BPM | DIASTOLIC BLOOD PRESSURE: 74 MMHG | WEIGHT: 169.98 LBS | SYSTOLIC BLOOD PRESSURE: 125 MMHG | HEIGHT: 70 IN

## 2025-03-18 DIAGNOSIS — Z90.79 ACQUIRED ABSENCE OF OTHER GENITAL ORGAN(S): Chronic | ICD-10-CM

## 2025-03-18 DIAGNOSIS — J96.01 ACUTE RESPIRATORY FAILURE WITH HYPOXIA: ICD-10-CM

## 2025-03-18 DIAGNOSIS — I50.23 ACUTE ON CHRONIC SYSTOLIC (CONGESTIVE) HEART FAILURE: ICD-10-CM

## 2025-03-18 DIAGNOSIS — Z51.5 ENCOUNTER FOR PALLIATIVE CARE: ICD-10-CM

## 2025-03-18 DIAGNOSIS — D37.9 NEOPLASM OF UNCERTAIN BEHAVIOR OF DIGESTIVE ORGAN, UNSPECIFIED: Chronic | ICD-10-CM

## 2025-03-18 DIAGNOSIS — Z71.89 OTHER SPECIFIED COUNSELING: ICD-10-CM

## 2025-03-18 LAB
ALBUMIN SERPL ELPH-MCNC: 2.7 G/DL — LOW (ref 3.3–5)
ALP SERPL-CCNC: 76 U/L — SIGNIFICANT CHANGE UP (ref 40–120)
ALT FLD-CCNC: 10 U/L — LOW (ref 12–78)
ANION GAP SERPL CALC-SCNC: 0 MMOL/L — LOW (ref 5–17)
APTT BLD: 38.2 SEC — HIGH (ref 24.5–35.6)
AST SERPL-CCNC: <3 U/L — LOW (ref 15–37)
BASOPHILS # BLD AUTO: 0.01 K/UL — SIGNIFICANT CHANGE UP (ref 0–0.2)
BASOPHILS NFR BLD AUTO: 0.1 % — SIGNIFICANT CHANGE UP (ref 0–2)
BILIRUB SERPL-MCNC: 0.3 MG/DL — SIGNIFICANT CHANGE UP (ref 0.2–1.2)
BUN SERPL-MCNC: 17 MG/DL — SIGNIFICANT CHANGE UP (ref 7–23)
CALCIUM SERPL-MCNC: 9.7 MG/DL — SIGNIFICANT CHANGE UP (ref 8.5–10.1)
CHLORIDE SERPL-SCNC: 91 MMOL/L — LOW (ref 96–108)
CO2 SERPL-SCNC: 40 MMOL/L — HIGH (ref 22–31)
CREAT SERPL-MCNC: 0.67 MG/DL — SIGNIFICANT CHANGE UP (ref 0.5–1.3)
EGFR: 89 ML/MIN/1.73M2 — SIGNIFICANT CHANGE UP
EGFR: 89 ML/MIN/1.73M2 — SIGNIFICANT CHANGE UP
EOSINOPHIL # BLD AUTO: 0.02 K/UL — SIGNIFICANT CHANGE UP (ref 0–0.5)
EOSINOPHIL NFR BLD AUTO: 0.2 % — SIGNIFICANT CHANGE UP (ref 0–6)
GLUCOSE SERPL-MCNC: 145 MG/DL — HIGH (ref 70–99)
HCT VFR BLD CALC: 41.6 % — SIGNIFICANT CHANGE UP (ref 39–50)
HGB BLD-MCNC: 13.1 G/DL — SIGNIFICANT CHANGE UP (ref 13–17)
IMM GRANULOCYTES NFR BLD AUTO: 0.7 % — SIGNIFICANT CHANGE UP (ref 0–0.9)
INR BLD: 1.2 RATIO — HIGH (ref 0.85–1.16)
LYMPHOCYTES # BLD AUTO: 0.35 K/UL — LOW (ref 1–3.3)
LYMPHOCYTES # BLD AUTO: 4.1 % — LOW (ref 13–44)
MAGNESIUM SERPL-MCNC: 1.7 MG/DL — SIGNIFICANT CHANGE UP (ref 1.6–2.6)
MCHC RBC-ENTMCNC: 28.3 PG — SIGNIFICANT CHANGE UP (ref 27–34)
MCHC RBC-ENTMCNC: 31.5 G/DL — LOW (ref 32–36)
MCV RBC AUTO: 89.8 FL — SIGNIFICANT CHANGE UP (ref 80–100)
MONOCYTES # BLD AUTO: 0.3 K/UL — SIGNIFICANT CHANGE UP (ref 0–0.9)
MONOCYTES NFR BLD AUTO: 3.5 % — SIGNIFICANT CHANGE UP (ref 2–14)
NEUTROPHILS # BLD AUTO: 7.85 K/UL — HIGH (ref 1.8–7.4)
NEUTROPHILS NFR BLD AUTO: 91.4 % — HIGH (ref 43–77)
NRBC BLD AUTO-RTO: 0 /100 WBCS — SIGNIFICANT CHANGE UP (ref 0–0)
NT-PROBNP SERPL-SCNC: 6721 PG/ML — HIGH (ref 0–450)
PHOSPHATE SERPL-MCNC: 4.6 MG/DL — HIGH (ref 2.5–4.5)
PLATELET # BLD AUTO: 225 K/UL — SIGNIFICANT CHANGE UP (ref 150–400)
POTASSIUM SERPL-MCNC: 4.5 MMOL/L — SIGNIFICANT CHANGE UP (ref 3.5–5.3)
POTASSIUM SERPL-SCNC: 4.5 MMOL/L — SIGNIFICANT CHANGE UP (ref 3.5–5.3)
PROCALCITONIN SERPL-MCNC: 0.04 NG/ML — SIGNIFICANT CHANGE UP
PROT SERPL-MCNC: 6.2 G/DL — SIGNIFICANT CHANGE UP (ref 6–8.3)
PROTHROM AB SERPL-ACNC: 14.1 SEC — HIGH (ref 9.9–13.4)
RBC # BLD: 4.63 M/UL — SIGNIFICANT CHANGE UP (ref 4.2–5.8)
RBC # FLD: 14.9 % — HIGH (ref 10.3–14.5)
SODIUM SERPL-SCNC: 131 MMOL/L — LOW (ref 135–145)
TROPONIN I, HIGH SENSITIVITY RESULT: 18 NG/L — SIGNIFICANT CHANGE UP
WBC # BLD: 8.59 K/UL — SIGNIFICANT CHANGE UP (ref 3.8–10.5)
WBC # FLD AUTO: 8.59 K/UL — SIGNIFICANT CHANGE UP (ref 3.8–10.5)

## 2025-03-18 PROCEDURE — 84100 ASSAY OF PHOSPHORUS: CPT

## 2025-03-18 PROCEDURE — 84145 PROCALCITONIN (PCT): CPT

## 2025-03-18 PROCEDURE — 83735 ASSAY OF MAGNESIUM: CPT

## 2025-03-18 PROCEDURE — 87040 BLOOD CULTURE FOR BACTERIA: CPT

## 2025-03-18 PROCEDURE — 80053 COMPREHEN METABOLIC PANEL: CPT

## 2025-03-18 PROCEDURE — 85025 COMPLETE CBC W/AUTO DIFF WBC: CPT

## 2025-03-18 PROCEDURE — 36415 COLL VENOUS BLD VENIPUNCTURE: CPT

## 2025-03-18 PROCEDURE — 83880 ASSAY OF NATRIURETIC PEPTIDE: CPT

## 2025-03-18 PROCEDURE — 99285 EMERGENCY DEPT VISIT HI MDM: CPT | Mod: 25

## 2025-03-18 PROCEDURE — 99284 EMERGENCY DEPT VISIT MOD MDM: CPT

## 2025-03-18 PROCEDURE — 96374 THER/PROPH/DIAG INJ IV PUSH: CPT

## 2025-03-18 PROCEDURE — 99285 EMERGENCY DEPT VISIT HI MDM: CPT

## 2025-03-18 PROCEDURE — 85730 THROMBOPLASTIN TIME PARTIAL: CPT

## 2025-03-18 PROCEDURE — 99284 EMERGENCY DEPT VISIT MOD MDM: CPT | Mod: 25

## 2025-03-18 PROCEDURE — 84484 ASSAY OF TROPONIN QUANT: CPT

## 2025-03-18 PROCEDURE — 85610 PROTHROMBIN TIME: CPT

## 2025-03-18 RX ORDER — LORAZEPAM 4 MG/ML
0.5 VIAL (ML) INJECTION
Qty: 9 | Refills: 0
Start: 2025-03-18 | End: 2025-03-20

## 2025-03-18 RX ORDER — ATROPINE SULFATE 1 %
2 OINTMENT (GRAM) OPHTHALMIC (EYE) EVERY 4 HOURS
Refills: 0 | Status: ACTIVE | OUTPATIENT
Start: 2025-03-18 | End: 2026-02-14

## 2025-03-18 RX ORDER — SENNA 187 MG
10 TABLET ORAL
Qty: 240 | Refills: 0
Start: 2025-03-18

## 2025-03-18 RX ORDER — ATROPINE SULFATE 1 %
2 OINTMENT (GRAM) OPHTHALMIC (EYE)
Qty: 1 | Refills: 0
Start: 2025-03-18 | End: 2025-03-22

## 2025-03-18 RX ORDER — LORAZEPAM 4 MG/ML
0.5 VIAL (ML) INJECTION
Qty: 30 | Refills: 0
Start: 2025-03-18 | End: 2025-03-27

## 2025-03-18 RX ADMIN — Medication 500 MILLILITER(S): at 06:46

## 2025-03-18 RX ADMIN — Medication 1 MILLIGRAM(S): at 12:19

## 2025-03-18 NOTE — CONSULT NOTE ADULT - SUBJECTIVE AND OBJECTIVE BOX
HPI:    PERTINENT PM/SXH:   Hypertension    Cancer of rectum    Asthenia    RBBB (right bundle branch block)    Asthma    TIA (transient ischemic attack)    UTI (urinary tract infection)    Enlarged prostate    Urinary retention with incomplete bladder emptying    Spinal stenosis    Chronic GERD      Rectal tumor    S/P TURP      FAMILY HISTORY:    Family Hx substance abuse [ ]yes [ ]no  ITEMS NOT CHECKED ARE NOT PRESENT    SOCIAL HISTORY:   Significant other/partner[ ]  Children[ ]  Sikhism/Spirituality:  Substance hx:  [ ]   Tobacco hx:  [ ]   Alcohol hx: [ ]   Home Opioid hx:  [ ] I-Stop Reference No:  Living Situation: [ ]Home  [ ]Long term care  [ ]Rehab [ ]Other    ADVANCE DIRECTIVES:    DNR/MOLST  [ ]  Living Will  [ ]   DECISION MAKER(s):  [ ] Health Care Proxy(s)  [ ] Surrogate(s)  [ ] Guardian           Name(s): Phone Number(s):    BASELINE (I)ADL(s) (prior to admission):  [place]: [ ]Total  [ ] Moderate [ ]Dependent    Allergies    No Known Allergies    Intolerances    MEDICATIONS  (STANDING):    MEDICATIONS  (PRN):    PRESENT SYMPTOMS: [ ]Unable to self-report  [ ] CPOT [ ] PAINADs [ ] RDOS  Source if other than patient:  [ ]Family   [ ]Team     Pain: [ ]yes [ ]no  QOL impact -   Location -                    Aggravating factors -  Quality -  Radiation -  Timing-  Severity (0-10 scale):  Minimal acceptable level (0-10 scale):     CPOT:    https://www.scc.org/getattachment/[email or web address]-0c1h-3v8i-9429o2317f4i/Critical-Care-Pain-Observation-Tool-(CPOT)    PAIN AD Score:   http://geriatrictoolkit.[facility]/cog/painad.pdf (press ctrl +  left click to view)    Dyspnea:                           [ ]Mild [ ]Moderate [ ]Severe      RDOS:  0 to 2  minimal or no respiratory distress   3  mild distress  4 to 6 moderate distress  >7 severe distress  https://[email or web address].net/handouts/hen/Respiratory_Distress_Observation_Scale.pdf (Ctrl +  left click to view)     Anxiety:                             [ ]Mild [ ]Moderate [ ]Severe  Fatigue:                             [ ]Mild [ ]Moderate [ ]Severe  Nausea:                             [ ]Mild [ ]Moderate [ ]Severe  Loss of appetite:              [ ]Mild [ ]Moderate [ ]Severe  Constipation:                    [ ]Mild [ ]Moderate [ ]Severe    PCSSQ[Palliative Care Spiritual Screening Question]   Severity (0-10):  Score of 4 or > indicate consideration of Chaplaincy referral.  Chaplaincy Referral: [ ] yes [ ] refused [ ] following [ ] Deferred     Caregiver [place]? : [ ] yes [ ] no [ ] Deferred [ ] Declined             Social work referral [ ] Patient & Family Centered Care Referral [ ]     Anticipatory Grief present?:  [ ] yes [ ] no  [ ] Deferred                  Social work referral [ ] Chaplaincy Referral[ ]      Other Symptoms:  [ ]All other review of systems negative     Palliative Performance Status Version 2:         %    http://npcrc.org/files/news/palliative_performance_scale_ppsv2.pdf  PHYSICAL EXAM:  Vital Signs Last 24 Hrs  T(C): 34.9 (18 Mar 2025 06:28), Max: 34.9 (18 Mar 2025 06:28)  T(F): 94.8 (18 Mar 2025 06:28), Max: 94.8 (18 Mar 2025 06:28)  HR: 78 (18 Mar 2025 07:48) (78 - 100)  BP: 118/69 (18 Mar 2025 07:48) (118/69 - 125/74)  BP(mean): --  RR: 16 (18 Mar 2025 07:48) (16 - 16)  SpO2: 98% (18 Mar 2025 07:48) (98% - 98%)    Parameters below as of 18 Mar 2025 07:48  Patient On (Oxygen Delivery Method): nasal cannula  O2 Flow (L/min): 5   I&O's Summary    GENERAL: [ ]Cachexia    [ ]Alert  [ ]Oriented x   [ ]Lethargic  [ ]Unarousable  [ ]Verbal  [ ]Non-Verbal  Behavioral:   [ ] Anxiety  [ ] Delirium [ ] Agitation [ ] Other  HEENT:  [ ]Normal   [ ]Dry mouth   [ ]ET Tube/Trach  [ ]Oral lesions  PULMONARY:   [ ]Clear [ ]Tachypnea  [ ]Audible excessive secretions   [ ]Rhonchi        [ ]Right [ ]Left [ ]Bilateral  [ ]Crackles        [ ]Right [ ]Left [ ]Bilateral  [ ]Wheezing     [ ]Right [ ]Left [ ]Bilateral  [ ]Diminished breath sounds [ ]right [ ]left [ ]bilateral  CARDIOVASCULAR:    [ ]Regular [ ]Irregular [ ]Tachy  [ ]Benoit [ ]Murmur [ ]Other  GASTROINTESTINAL:  [ ]Soft  [ ]Distended   [ ]+BS  [ ]Non tender [ ]Tender  [ ]Other [ ]PEG [ ]OGT/ NGT  Last BM:  GENITOURINARY:  [ ]Normal [ ] Incontinent   [ ]Oliguria/Anuria   [ ][place]  MUSCULOSKELETAL:   [ ]Normal   [ ]Weakness  [ ]Bed/Wheelchair bound [ ]Edema  NEUROLOGIC:   [ ]No focal deficits  [ ]Cognitive impairment  [ ]Dysphagia [ ]Dysarthria [ ]Paresis [ ]Other   SKIN:   [ ]Normal  [ ]Rash  [ ]Other  [ ]Pressure ulcer(s)       Present on admission [ ]y [ ]n    CRITICAL CARE:  [ ] Shock Present  [ ]Septic [ ]Cardiogenic [ ]Neurologic [ ]Hypovolemic  [ ]  Vasopressors [ ]  Inotropes   [ ]Respiratory failure present [ ]Mechanical ventilation [ ]Non-invasive ventilatory support [ ]High flow    [ ]Acute  [ ]Chronic [ ]Hypoxic  [ ]Hypercarbic [ ]Other  [ ]Other organ failure     LABS:                        13.1   8.59  )-----------( 225      ( 18 Mar 2025 06:40 )             41.6   03-18    131[L]  |  91[L]  |  17  ----------------------------<  145[H]  4.5   |  40[H]  |  0.67    Ca    9.7      18 Mar 2025 06:40  Phos  4.6     03-18  Mg     1.7     03-18    TPro  6.2  /  Alb  2.7[L]  /  TBili  0.3  /  DBili  x   /  AST  <3[L]  /  ALT  10[L]  /  AlkPhos  76  03-18  PT/INR - ( 18 Mar 2025 06:40 )   PT: 14.1 sec;   INR: 1.20 ratio         PTT - ( 18 Mar 2025 06:40 )  PTT:38.2 sec    Urinalysis Basic - ( 18 Mar 2025 06:40 )    Color: x / Appearance: x / SG: x / pH: x  Gluc: 145 mg/dL / Ketone: x  / Bili: x / Urobili: x   Blood: x / Protein: x / Nitrite: x   Leuk Esterase: x / RBC: x / WBC x   Sq Epi: x / Non Sq Epi: x / Bacteria: x      RADIOLOGY & ADDITIONAL STUDIES:    PROTEIN CALORIE MALNUTRITION PRESENT: [ ]mild [ ]moderate [ ]severe [ ]underweight [ ]morbid obesity  https://www.andeal.org/vault/2440/web/files/ONC/Table_Clinical%20Characteristics%20to%20Document%20Malnutrition-White%20JV%20et%20al%410504.pdf    Height (cm): 177.8 (03-18-25 @ 06:28), 177.8 (03-14-25 @ 09:17), 177.8 (01-23-25 @ 13:59)  Weight (kg): 77.1 (03-18-25 @ 06:28), 68 (03-14-25 @ 09:17), 80.4 (01-27-25 @ 15:35)  BMI (kg/m2): 24.4 (03-18-25 @ 06:28), 21.5 (03-14-25 @ 09:17), 25.4 (01-27-25 @ 15:35)    [ ]PPSV2 < or = to 30% [ ]significant weight loss  [ ]poor nutritional intake  [ ]anasarca[ ]Artificial Nutrition      Other REFERRALS:  [ ]Hospice  [ ]Child Life  [ ]Social Work  [ ]Case management [ ]Holistic Therapy      HPI: per ED provider note: 89M PMH COPD, CHF on Lasix, A-fib on Eliquis, GERD, hypertension, hyperlipidemia, CVA, diabetes, rectal CA with chronic diarrhea from enemas, atonic bladder BIBEMS for unresponsiveness. Aide went to go wake up pt but he wouldnt wake up. O2 sat 98% on 6L NC (baseline is 3L NC). Pt recently completed abx; also had SBO, pericardiocentesis, thoracentesis at [facility] recently. Pt is awake/alert at baseline. HCP (Daughter Lilibeth) w/ her  at bedside: they confirm they want to keep interventions at minimum. No CPR, intubations, pressors, abx, cxr, ecg, CT's. They are ok with IVF. They want to head towards hospice route      PERTINENT PM/SXH:   Hypertension    Cancer of rectum    Asthenia    RBBB (right bundle branch block)    Asthma    TIA (transient ischemic attack)    UTI (urinary tract infection)    Enlarged prostate    Urinary retention with incomplete bladder emptying    Spinal stenosis    Chronic GERD      Rectal tumor    S/P TURP      FAMILY HISTORY: no known hx in first degree relatives    Family Hx substance abuse [ ]yes [x ]no  ITEMS NOT CHECKED ARE NOT PRESENT    SOCIAL HISTORY:   Significant other/partner[ ]  Children[x ]  Voodoo/Spirituality:  Substance hx:  [ ]   Tobacco hx:  [ ]   Alcohol hx: [ ]   Home Opioid hx:  [ ] I-Stop Reference No:  Living Situation: [x ]Home  [ ]Long term care  [ ]Rehab [ ]Other    ADVANCE DIRECTIVES:    DNR/MOLST  [x ]  Living Will  [ ]   DECISION MAKER(s):  [x ] Health Care Proxy(s)  [ ] Surrogate(s)  [ ] Guardian           Name(s): Phone Number(s): tomasa Mcelroy, number per EMR    BASELINE (I)ADL(s) (prior to admission):  [place]: [ ]Total  [x ] Moderate [ ]Dependent    Allergies    No Known Allergies    Intolerances    MEDICATIONS  (STANDING):    MEDICATIONS  (PRN):    PRESENT SYMPTOMS: [ x]Unable to self-report  [ ] CPOT [x ] PAINADs [ x] RDOS  Source if other than patient:  [ ]Family   [ ]Team     Pain: [ ]yes [ ]no  QOL impact -   Location -                    Aggravating factors -  Quality -  Radiation -  Timing-  Severity (0-10 scale):  Minimal acceptable level (0-10 scale):     CPOT:    https://www.[facility].org/getattachment/[email or web address]-[email or web address]-8t1o-[email or web address]/Critical-Care-Pain-Observation-Tool-(CPOT)    PAIN AD Score:   http://geriatrictoolkit.[facility]/cog/painad.pdf (press ctrl +  left click to view)    Dyspnea:                           [ ]Mild [ ]Moderate [ ]Severe      RDOS:  0 to 2  minimal or no respiratory distress   3  mild distress  4 to 6 moderate distress  >7 severe distress  https://[email or web address]/handouts/hen/Respiratory_Distress_Observation_Scale.pdf (Ctrl +  left click to view)     Anxiety:                             [ ]Mild [ ]Moderate [ ]Severe  Fatigue:                             [ ]Mild [ ]Moderate [ ]Severe  Nausea:                             [ ]Mild [ ]Moderate [ ]Severe  Loss of appetite:              [ ]Mild [ ]Moderate [ ]Severe  Constipation:                    [ ]Mild [ ]Moderate [ ]Severe    PCSSQ[Palliative Care Spiritual Screening Question]   Severity (0-10):  Score of 4 or > indicate consideration of Chaplaincy referral.  Chaplaincy Referral: [ ] yes [ ] refused [ ] following [x ] Deferred     Caregiver [place]? : [ ] yes [ x] no [ ] Deferred [ ] Declined             Social work referral [ ] Patient & Family Centered Care Referral [ ]     Anticipatory Grief present?:  [ ] yes [x ] no  [ ] Deferred                  Social work referral [ ] Chaplaincy Referral[ ]      Other Symptoms:  [ ]All other review of systems negative     Palliative Performance Status Version 2:    10-20    %    http://npcrc.org/files/news/palliative_performance_scale_ppsv2.pdf  PHYSICAL EXAM:  Vital Signs Last 24 Hrs  T(C): 34.9 (18 Mar 2025 06:28), Max: 34.9 (18 Mar 2025 06:28)  T(F): 94.8 (18 Mar 2025 06:28), Max: 94.8 (18 Mar 2025 06:28)  HR: 78 (18 Mar 2025 07:48) (78 - 100)  BP: 118/69 (18 Mar 2025 07:48) (118/69 - 125/74)  BP(mean): --  RR: 16 (18 Mar 2025 07:48) (16 - 16)  SpO2: 98% (18 Mar 2025 07:48) (98% - 98%)    Parameters below as of 18 Mar 2025 07:48  Patient On (Oxygen Delivery Method): nasal cannula  O2 Flow (L/min): 5   I&O's Summary    GENERAL: [ x]Cachexia    [ ]Alert  [ ]Oriented x   [ ]Lethargic  [x ]Unarousable  [ ]Verbal  [ ]Non-Verbal  Behavioral:   [ ] Anxiety  [ ] Delirium [ ] Agitation [ ] Other  HEENT:  [ ]Normal   [ x]Dry mouth   [ ]ET Tube/Trach  [ ]Oral lesions  PULMONARY:   [ ]Clear [ x]Tachypnea  [ ]Audible excessive secretions   [ ]Rhonchi        [ ]Right [ ]Left [ ]Bilateral  [ ]Crackles        [ ]Right [ ]Left [ ]Bilateral  [ ]Wheezing     [ ]Right [ ]Left [ ]Bilateral  [ x]Diminished breath sounds [ ]right [ ]left [ ]bilateral  CARDIOVASCULAR:    [ x]Regular [ ]Irregular [ ]Tachy  [ ]Ebnoit [ ]Murmur [ ]Other  GASTROINTESTINAL:  [ xSoft  [ ]Distended   [ x]+BS  [ ]Non tender [ ]Tender  [ ]Other [ ]PEG [ ]OGT/ NGT  Last BM:  GENITOURINARY:  [ ]Normal [x ] Incontinent   [ ]Oliguria/Anuria   [ xFoley  MUSCULOSKELETAL:   [ ]Normal   [ x]Weakness  [x ]Bed/Wheelchair bound [ ]Edema  NEUROLOGIC:   [ ]No focal deficits  [ ]Cognitive impairment  [ ]Dysphagia [ ]Dysarthria [ ]Paresis [ ]Other   SKIN:   [ ]Normal  [ ]Rash  [ ]Other  [ ]Pressure ulcer(s)       Present on admission [ ]y [ ]n    CRITICAL CARE:  [ ] Shock Present  [ ]Septic [ ]Cardiogenic [ ]Neurologic [ ]Hypovolemic  [ ]  Vasopressors [ ]  Inotropes   [ x]Respiratory failure present [ ]Mechanical ventilation [ ]Non-invasive ventilatory support [ ]High flow    [ ]Acute  [ ]Chronic [ ]Hypoxic  [ ]Hypercarbic [ ]Other  [ ]Other organ failure     LABS:                        13.1   8.59  )-----------( 225      ( 18 Mar 2025 06:40 )             41.6   03-18    131[L]  |  91[L]  |  17  ----------------------------<  145[H]  4.5   |  40[H]  |  0.67    Ca    9.7      18 Mar 2025 06:40  Phos  4.6     03-18  Mg     1.7     03-18    TPro  6.2  /  Alb  2.7[L]  /  TBili  0.3  /  DBili  x   /  AST  <3[L]  /  ALT  10[L]  /  AlkPhos  76  03-18  PT/INR - ( 18 Mar 2025 06:40 )   PT: 14.1 sec;   INR: 1.20 ratio         PTT - ( 18 Mar 2025 06:40 )  PTT:38.2 sec    Urinalysis Basic - ( 18 Mar 2025 06:40 )    Color: x / Appearance: x / SG: x / pH: x  Gluc: 145 mg/dL / Ketone: x  / Bili: x / Urobili: x   Blood: x / Protein: x / Nitrite: x   Leuk Esterase: x / RBC: x / WBC x   Sq Epi: x / Non Sq Epi: x / Bacteria: x      RADIOLOGY & ADDITIONAL STUDIES: recent imaging reviewed    PROTEIN CALORIE MALNUTRITION PRESENT: [ ]mild [ ]moderate [ ]severe [ ]underweight [ ]morbid obesity  https://www.andeal.org/vault/2440/web/files/ONC/Table_Clinical%20Characteristics%20to%20Document%20Malnutrition-White%20JV%20et%20al%202012.pdf    Height (cm): 177.8 (03-18-25 @ 06:28), 177.8 (03-14-25 @ 09:17), 177.8 (01-23-25 @ 13:59)  Weight (kg): 77.1 (03-18-25 @ 06:28), 68 (03-14-25 @ 09:17), 80.4 (01-27-25 @ 15:35)  BMI (kg/m2): 24.4 (03-18-25 @ 06:28), 21.5 (03-14-25 @ 09:17), 25.4 (01-27-25 @ 15:35)    [x ]PPSV2 < or = to 30% [ ]significant weight loss  [x ]poor nutritional intake  [ ]anasarca[ ]Artificial Nutrition      Other REFERRALS:  [x ]Hospice  [ ]Child Life  [ x]Social Work  [ ]Case management [ ]Holistic Therapy

## 2025-03-18 NOTE — SOCIAL WORK PROGRESS NOTE - NSSWPROGRESSNOTE_GEN_ALL_CORE
Per pall care team, pt cleared for dc home with hospice. Hospice Care /Alycia confirmed SOC for tomorrow 3/19/25 in the am. Pt's daughter/Lilibeth aware and in agreement with plan. Bartolome barrigaup scheduled for 12:30pm.

## 2025-03-18 NOTE — CONSULT NOTE ADULT - PROBLEM SELECTOR RECOMMENDATION 3
End stage heart failure, family requesting home hospice.  appropriate. family has o2 and DME at home.   HC[facility] referral sent and this provider spoke to liaison to expedite.  family not interested in inpatient, goal is to get him home ASAP.    For patients being discharged with home hospice services, symptom directed meds to be considered include the following:    Morphine solution 5mg q4h prn for pain and/or dyspnea  Ativan solution 0.5-1mg q4h prn for anxiety, agitation, refractory dyspnea  Atropine opthalmic solution (1%) 2 drops SL q4h prn for secretions  Bowel regimen with either Senna 2 tabs HS or dulcolax suppository

## 2025-03-18 NOTE — DISCHARGE NOTE NURSING/CASE MANAGEMENT/SOCIAL WORK - NSDCPETBCESMAN_GEN_ALL_CORE
If you are a smoker, it is important for your health to stop smoking. Please be aware that second hand smoke is also harmful. [[id number]]

## 2025-03-18 NOTE — ED ADULT NURSE NOTE - OBJECTIVE STATEMENT
Pt cold and unresponsive at home brought in not responding eyes sluggish perrla. Pt vitals wnl pacemaker in left chest wall. Pt has dnr dni family here wants comfort care.

## 2025-03-18 NOTE — ED PROVIDER NOTE - NSFOLLOWUPINSTRUCTIONS_ED_ALL_ED_FT
Referral has been made for Home Hospice.    Please take the following medications as prescribed and or instructed.    Morphine solution 5 mg every 4 hours as needed for pain and/or dyspnea  Ativan solution 0.5-1mg every 4 hours as needed for anxiety, agitation, refractory dyspnea  Atropine opthalmic solution (1%) 2 drops SL every 4 hours as needed for secretions  Additionally please  either Senna (2 tabs at bed time) or dulcolax suppository for use as bowel regimen, to prevent constipation.      If your symptoms persist or worsen, please seek care. Either return to the Emergency Department, go to urgent care or see your primary care doctor.  Please refer to general information and instructions attached or below:     End-of-Life Care  Three hands holding each other.  End-of-life care is the physical, emotional, mental, and spiritual care given during the last days, weeks, or months of your life. This care is given by a team. The team may include:  Health care providers.  A  or mental health provider.  A spiritual adviser.  The goal of this care is to give you the best quality of life possible.    What are the different types of end-of-life care?  There are a few types of care you may receive.    Palliative care    Palliative care can be given at the same time as other treatments. The goal is to manage your symptoms and make your quality of life better. It may include:  Help with pain and other symptoms.  Family support.  Spiritual support.  Emotional support.  Social support.  You may need this type of care for months or years to manage a long-term (chronic) condition.    Hospice care    Hospice care is a kind of end-of-life care that is given when you likely have 6 months or less to live. Your provider may talk with you about this type of care. It can give you and your family:  Medical support.  Emotional support.  Spiritual support.  The goal is to keep you as comfortable as possible in the last stages of life.    Comfort care    Comfort care is a type of care that aims to help meet your basic needs and keep you comfortable. It may include:  Caring for your skin.  Making sure you're breathing well.  Making sure you get enough rest.  Making sure you're at a body temperature that feels right.  A plan for comfort care can also help with issues that may come up at the end of your life. These may include:  Mental issues.  Emotional issues.  Spiritual issues.  Where does end-of-life care take place?  End-of-life care can take place where you're living, as long as you get the care you need. It can happen:  At your home.  In a nursing home.  In a hospital.  In a special care facility, such as a hospice house.  You and your loved ones may be able to choose where your care takes place. This may be based on:  Your wishes.  Your comfort.  The medical tools and physical help you need.  Your loved one's ability to manage your care.  How do I know when it's time for end-of-life care?  Your provider may tell you that treatments can no longer control your illness. You may also decide that you don't want any more treatments. Talk to your provider and your loved ones about how you're feeling and what you want.    What should I plan as part of end-of-life care?  Talk ahead of time with your provider about your plan for end-of-life care. This may include:  How much treatment you want.  Where you want to live.  What kinds of treatments you would like.  Which treatments you don't want.  Talk with your family or friends about:  Your howard or spiritual needs.  Who will handle practical details, such as:  Your will.  Finances.   plans.  What legal papers do I need to create?  You can write up legal papers, called advance directives, to let your loved ones know your wishes for end-of-life care. You may want to talk with your provider or a  about writing a living will. A living will is a paper that states your wishes about medical care.    You can also have a medical power of  (POA). This is when you choose a person to make health decisions for you if you can't make them yourself.    Where to find more information  National [place] on Aging (NADJA): nadja.nih.gov  National Hospice and Palliative Care Organization (NHPCO): nhpco.org  This information is not intended to replace advice given to you by your health care provider. Make sure you discuss any questions you have with your health care provider.  ====================================================================   Hospice  You may need hospice if you're very sick with something that will end your life. Hospice care can give you and your family medical, emotional, and spiritual support. The goal is to help keep you as comfortable as possible in the last stages of life.    Who makes up a hospice care team?  Your hospice care team may include:  You and your family.  A nurse and a hospice doctor. Your health care provider may also be part of the team.  A  or .  A Gnosticist or spiritual leader.  Specialists. These may include:  An expert in healthy eating called a dietitian.  A mental health counselor.  Physical therapists.  Occupational therapists.  A grief counselor.  Trained volunteers who can help with care.  What services are part of hospice care?  A person with cancer wearing a head wrap and sitting up supported by pillows. Someone is holding their hand.  Hospice services can vary. They may include:  Ways to keep you comfortable. These ways may include:  Caring for you in your home or in a home-like setting.  Giving you relief from pain and symptoms. You'll be kept comfortable and alert enough to enjoy being with friends and family.  Working with your loved ones to help them do most of your basic care.  Visits or care from a nurse and doctor. Care may include 24-hour on-call services.  Visits from specialists. Their services may include:  Counseling.  Massage therapy.  Nutrition therapy.  Physical and occupational therapy.  Art or music therapy.  Spiritual care. This may involve:  Helping you and your family understand the dying process.  Helping you say goodbye to your family and friends.  A Gnosticist ceremony or ritual.  Someone to stay with you when you're alone.  Letting you and your family rest. Hospice staff may do light work around the house, make meals, and run errands.  Short-term care in a health care setting, if something can't be managed at home.  Grief support for family members.  When should hospice care start?  Your family and providers can help you decide when hospice care should start. You may need hospice if:  You're thought to have 6 months or less to live.  If you live longer than 6 months but aren't getting better, you may be able to keep getting hospice care.  If you get better, you may drop out of the program.  You no longer can, or want to, try to find a cure for your illness.  What should I consider before selecting a program?  Most hospice programs are run by outside groups, such as non-profits. Some may be linked to hospitals, nursing homes, or home health care agencies. These programs can take place at:  Home.  A hospice center.  A hospital.  A skilled nursing facility.  When choosing a hospice program, ask:  What services are there for me and my loved ones?  What does the program cost? Will insurance pay for it?  Is the program licensed by the state or certified in some other way?  How will my pain and symptoms be managed?  What sort of spiritual and emotional support will you provide?  If I choose a hospice center or nursing home, where is it located? Is it somewhere my family and friends can get to easily?  If I choose a hospice center or nursing home, will my family and friends be able to visit any time?  If needed, can the setting for the services change?  Who makes up the hospice care team? How are they trained or screened?  How involved can my loved ones be?  Whom can my family call with questions?  What role does my provider have?  Where can I learn more about hospice?  You can learn about hospice programs in your area from your provider. These websites may be helpful:  National Hospice and Palliative Care Organization (NHPCO): nhpco.org  National Association for Home Care & Hospice (NAHC): nahc.org  Hospice Foundation of [place]: hospicefoundation.org  American Cancer Society: cancer.org  eH[facility]ice: [employer]  You may also want to talk with:  A local agency on aging.  Your local chapter of [facility].  Your state's department of health or .  This information is not intended to replace advice given to you by your health care provider. Make sure you discuss any questions you have with your health care provider.

## 2025-03-18 NOTE — CARE COORDINATION ASSESSMENT. - REFERRAL INFORMATION CONCERNS
Consult received for hospice referral, pt's daughter requesting referral to home hospice at this time. Spoke with pall care team, referral process initiated. Referral made to [facility]. Awaiting response./concerns to be addressed

## 2025-03-18 NOTE — CONSULT NOTE ADULT - ASSESSMENT
End stage heart failure, family requesting home hospice.  appropriate. family has o2 and DME at home.   HCN referral sent and this provider spoke to liaison to expedite.  family not interested in inpatient, goal is to get him home ASAP.    For patients being discharged with home hospice services, symptom directed meds to be considered include the following:    Morphine solution 5mg q4h prn for pain and/or dyspnea  Ativan solution 0.5-1mg q4h prn for anxiety, agitation, refractory dyspnea  Atropine opthalmic solution (1%) 2 drops SL q4h prn for secretions  Bowel regimen with either Senna 2 tabs HS or dulcolax suppository     Please reach out if questions or concerns. full consult to follow  Melissa Yates MD, [facility]; Palliative Care Attending, [phone]  89M PMH COPD, CHF on Lasix, A-fib on Eliquis, GERD, hypertension, hyperlipidemia, CVA, diabetes, rectal CA with chronic diarrhea from enemas, atonic bladder BIBEMS for unresponsiveness. Palliative consulted in ED as family interested in home hospice.

## 2025-03-18 NOTE — ED CLERICAL - NS ED CLERK NOTE PRE-ARRIVAL INFORMATION; ADDITIONAL PRE-ARRIVAL INFORMATION

## 2025-03-18 NOTE — CONSULT NOTE ADULT - PROBLEM SELECTOR RECOMMENDATION 9
c/w supplemental oxygen  IV morphine x1 dose now  stop IVF  hx of pleural effusions    no further work up/interventions, per family  DNI

## 2025-03-18 NOTE — CONSULT NOTE ADULT - CONVERSATION DETAILS
Met with daughter on this date, reviewed clinical status  Daughter shares about patients funcitonal decline, recent hospitalizations and unresponsiveness this morning.  States patient has been seen by [facility] house calls, has been on home oxygen with worsening over the weekend.  Today was found by aides to not be awake, alert and they brought him in for evaluation. Family states his dying wish is to die at home, she doesn't want him in an inpatient facility or to be admitted. We discussed hospice referral and comfort care  Discussed no further blood draws, finger sticks, IVF, IV abx, feeding tube, dialysis or escalation of care.  Discussed use of opiates and other medications for ease of pain/breathing, etc. Daughter in agreement with referral and D/C asap to get patient home. Discussed life expectancy of hours/days. MOLST updated to support wishes. HCN referral made. This provider called and spoke with liaison to expedite referral. SW to f/u re: dc planning. Reviewed with Dr. Malagon.

## 2025-03-18 NOTE — CARE COORDINATION ASSESSMENT. - NSPASTMEDSURGHISTORY_GEN_ALL_CORE_FT
PAST MEDICAL & SURGICAL HISTORY:  Cancer of rectum      Hypertension      Rectal tumor      Asthma      RBBB (right bundle branch block)      Chronic GERD      Spinal stenosis      Urinary retention with incomplete bladder emptying      Enlarged prostate      UTI (urinary tract infection)      TIA (transient ischemic attack)      S/P TURP

## 2025-03-18 NOTE — DISCHARGE NOTE NURSING/CASE MANAGEMENT/SOCIAL WORK - FINANCIAL ASSISTANCE
[facility] provides services at a reduced cost to those who are determined to be eligible through [facility]’s financial assistance program. Information regarding [facility]’s financial assistance program can be found by going to [email or web address] or by calling [phone].

## 2025-03-18 NOTE — ED PROVIDER NOTE - CLINICAL SUMMARY MEDICAL DECISION MAKING FREE TEXT BOX
89M PMH COPD, CHF on Lasix, A-fib on Eliquis, GERD, hypertension, hyperlipidemia, CVA, diabetes, rectal CA with chronic diarrhea from enemas, atonic bladder BIBEMS for unresponsiveness. Aide went to go wake up pt but he wouldnt wake up. O2 sat 98% on 6L NC (baseline is 3L NC). Pt recently completed abx; also had SBO, pericardiocentesis, thoracentesis at [facility] recently. Pt is awake/alert at baseline. HCP (Daughter Lilibeth) w/ her  at bedside: they confirm they want to keep interventions at minimum. No CPR, intubations, pressors, abx, cxr, ecg, CT's. They are ok with IVF. They want to head towards hospice route    Gen: unresponsive, nonverbal  HEENT: normal conjunctiva, oral mucosa moist  Lung: on NC, rhonchi B/L  CV: RRR  Abd: soft, NT, ND  MSK: no visible deformities  Skin: cold extremities, small skin tear to distal L forearm    At this time, DDx remains broad: CHF, ACS, infectious etiology, VANDA, lyte derangements, intracranial bleed, CVA  Plan: see above for details but family wants to keep interventions at minimum. At this time, labs already sent. They only want IVF at this time and do not want to pursue imaging. SW consult placed. MOLST form is present at bedside. Family expresses understanding that pt is critically ill and death may be imminent    See Progress Notes for further updates on ED Course

## 2025-03-18 NOTE — CONSULT NOTE ADULT - PROBLEM SELECTOR RECOMMENDATION 4
family wishing to take patient home ASAP with hospice.  Discussed with ED Attending, SW and HCN.    call if issues arise.    Melissa Yates MD, [facility]; Palliative Care Attending, [phone]

## 2025-03-18 NOTE — ED PROVIDER NOTE - PATIENT PORTAL LINK FT
You can access the FollowMyHealth Patient Portal offered by [facility] by registering at the following website: [email or web address]. By joining [employer]’s FollowMyHealth portal, you will also be able to view your health information using other applications (apps) compatible with our system.

## 2025-03-18 NOTE — DISCHARGE NOTE NURSING/CASE MANAGEMENT/SOCIAL WORK - PATIENT PORTAL LINK FT
You can access the FollowMyHealth Patient Portal offered by [facility] by registering at the following website: [email or web address]. By joining [employer]’s FollowMyHealth portal, you will also be able to view your health information using other applications (apps) compatible with our system.

## 2025-03-18 NOTE — ED ADULT NURSE NOTE - NSFALLHARMRISKINTERV_ED_ALL_ED

## 2025-03-18 NOTE — CARE COORDINATION ASSESSMENT. - NSCAREPROVIDERS_GEN_ALL_CORE_FT
CARE PROVIDERS:  Access Services: Yvonne Stark  Access Services: Sabiha Grossman  Admitting: Doctor, Unknown  Attending: Doctor, Unknown  Consultant: Melissa Yates  Covering Nurse: Annabella Gordon  ED Attending: Alexx Mckay  ED Nurse: Trent Nixon  Emergency Medicine: Yue Malagon  Intern: Meaghan Borges  Nurse: Najma Harding  Nurse: Ayse Banuelos  Outpatient Provider: Tulio Carrington  Override: Ayse Banuelos  Quality Review: Candi Miller  : Nova Washington  : Kenna Ward  Team: [facility], Team  UR// Supp. Assoc.: Haley Jeffries

## 2025-03-18 NOTE — ED PROVIDER NOTE - PROGRESS NOTE DETAILS
Signout comfort measures only, pending social work/palliative care for hospice referral with the goal of home hospice.  Patient seen by palliative care here and hospice referral made/expedited.  Okay for discharge with medications for symptomatic treatment.

## 2025-03-18 NOTE — CARE COORDINATION ASSESSMENT. - REASON FOR CONSULT
Met with pt's daughter/Lilibeth and pt's son in law at bedside in order to conduct assessment and to discuss SW roles with good understanding.   Pt not alert at this time unable to participate in the assessment./coordination of care/discharge planning

## 2025-03-18 NOTE — ED ADULT NURSE REASSESSMENT NOTE - NSFALLHARMRISKINTERV_ED_ALL_ED
Assistance OOB with selected safe patient handling equipment if applicable/Communicate risk of Fall with Harm to all staff, patient, and family/Provide visual cue: red socks, yellow wristband, yellow gown, etc/Reinforce activity limits and safety measures with patient and family/Toileting schedule using arm’s reach rule for commode and bathroom/Bed in lowest position, wheels locked, appropriate side rails in place/Call bell, personal items and telephone in reach/Instruct patient to call for assistance before getting out of bed/chair/stretcher/Non-slip footwear applied when patient is off stretcher/[place] to call system/Physically safe environment - no spills, clutter or unnecessary equipment/Purposeful Proactive Rounding/Room/bathroom lighting operational, light cord in reach

## 2025-03-19 LAB
CULTURE RESULTS: SIGNIFICANT CHANGE UP
CULTURE RESULTS: SIGNIFICANT CHANGE UP
SPECIMEN SOURCE: SIGNIFICANT CHANGE UP
SPECIMEN SOURCE: SIGNIFICANT CHANGE UP

## 2025-03-20 ENCOUNTER — TRANSCRIPTION ENCOUNTER (OUTPATIENT)
Age: 89
End: 2025-03-20

## 2025-03-24 NOTE — ED ADULT TRIAGE NOTE - CCCP TRG CHIEF CMPLNT
Physical Therapy Daily Treatment Note  [facility]  [address], [address]  [place] [id number]    Patient: Ck Arreguin   : 1960  Referring practitioner: Derian Mack MD  Today's Date: 3/24/2025  Patient seen for 4 sessions    Visit Diagnoses:    ICD-10-CM ICD-9-CM   1. Right knee pain, unspecified chronicity  M25.561 719.46   2. Tear of lateral meniscus of right knee, unspecified tear type, unspecified whether old or current tear, subsequent encounter  S83.281D V58.89     836.1   3. Tear of medial meniscus of right knee, unspecified tear type, unspecified whether old or current tear, subsequent encounter  S83.241D V58.89     836.0   4. Chondromalacia of knee, right  M94.261 717.7       Subjective   Ck Arreguin reports: that he feels like the tape was helpful and would like to tape again.  Has had no sensation of the knee giving out on him or locking up on him.  States that most of the pain is just medial and deep to the patella.      Objective   Presents with symmetrical gait pattern    See Exercise, Manual, and Modality Logs for complete treatment.     Patient Education: instructed him in taping of the knees for support while on vacation    Assessment/Plan  Ck has less pain now with strengthening exercises.  Tape does provide some relief.  Patient voiced understanding in doing it for self application while on vacation.    Progress strengthening /stabilization /functional activity           Timed:  Manual Therapy:    10     mins  23297;  Therapeutic Exercise:    10/30     mins  80829;     Neuromuscular Jay:    10    mins  23739;    Therapeutic Activity:     0     mins  15032;     Ultrasound:      0     mins  75237;    Iontophoresis              __0_   mins  Dry Needling               _____   mins        Untimed:  Electrical Stimulation:     0    mins  73813 ( );  Mechanical Traction:             mins  83114;   Paraffin                        _____  mins     Timed Treatment:   30   mins   Total Treatment:     60   mins    Kayleen Johnson, PT  [place] License # [id number]  Physical Therapist    Electronically signed by Kayleen Johnson PT, 03/24/25, 9:29 AM EDT       abdominal pain/urinary/bladder trouble

## 2025-07-23 ENCOUNTER — NON-APPOINTMENT (OUTPATIENT)
Age: 89
End: 2025-07-23